# Patient Record
Sex: FEMALE | Race: WHITE | NOT HISPANIC OR LATINO | Employment: OTHER | URBAN - METROPOLITAN AREA
[De-identification: names, ages, dates, MRNs, and addresses within clinical notes are randomized per-mention and may not be internally consistent; named-entity substitution may affect disease eponyms.]

---

## 2021-12-15 ENCOUNTER — OFFICE VISIT (OUTPATIENT)
Dept: WOUND CARE | Facility: HOSPITAL | Age: 61
End: 2021-12-15
Payer: COMMERCIAL

## 2021-12-15 VITALS
DIASTOLIC BLOOD PRESSURE: 94 MMHG | TEMPERATURE: 97.3 F | WEIGHT: 203 LBS | BODY MASS INDEX: 37.36 KG/M2 | RESPIRATION RATE: 20 BRPM | HEART RATE: 76 BPM | HEIGHT: 62 IN | SYSTOLIC BLOOD PRESSURE: 170 MMHG

## 2021-12-15 DIAGNOSIS — L97.919 CHRONIC VENOUS HYPERTENSION (IDIOPATHIC) WITH ULCER OF RIGHT LOWER EXTREMITY (HCC): Primary | ICD-10-CM

## 2021-12-15 DIAGNOSIS — I87.311 CHRONIC VENOUS HYPERTENSION (IDIOPATHIC) WITH ULCER OF RIGHT LOWER EXTREMITY (HCC): Primary | ICD-10-CM

## 2021-12-15 PROCEDURE — 87186 SC STD MICRODIL/AGAR DIL: CPT | Performed by: FAMILY MEDICINE

## 2021-12-15 PROCEDURE — 99204 OFFICE O/P NEW MOD 45 MIN: CPT | Performed by: FAMILY MEDICINE

## 2021-12-15 PROCEDURE — 87070 CULTURE OTHR SPECIMN AEROBIC: CPT | Performed by: FAMILY MEDICINE

## 2021-12-15 PROCEDURE — 11042 DBRDMT SUBQ TIS 1ST 20SQCM/<: CPT | Performed by: FAMILY MEDICINE

## 2021-12-15 PROCEDURE — 87147 CULTURE TYPE IMMUNOLOGIC: CPT | Performed by: FAMILY MEDICINE

## 2021-12-15 PROCEDURE — 99203 OFFICE O/P NEW LOW 30 MIN: CPT | Performed by: FAMILY MEDICINE

## 2021-12-15 PROCEDURE — 87205 SMEAR GRAM STAIN: CPT | Performed by: FAMILY MEDICINE

## 2021-12-15 RX ORDER — SULFAMETHOXAZOLE AND TRIMETHOPRIM 800; 160 MG/1; MG/1
1 TABLET ORAL EVERY 12 HOURS SCHEDULED
Qty: 20 TABLET | Refills: 0 | Status: SHIPPED | OUTPATIENT
Start: 2021-12-15 | End: 2021-12-27 | Stop reason: HOSPADM

## 2021-12-15 RX ORDER — LIDOCAINE HYDROCHLORIDE 40 MG/ML
5 SOLUTION TOPICAL ONCE
Status: COMPLETED | OUTPATIENT
Start: 2021-12-15 | End: 2021-12-15

## 2021-12-15 RX ADMIN — LIDOCAINE HYDROCHLORIDE 5 ML: 40 SOLUTION TOPICAL at 09:49

## 2021-12-18 LAB
BACTERIA WND AEROBE CULT: ABNORMAL
BACTERIA WND AEROBE CULT: ABNORMAL
GRAM STN SPEC: ABNORMAL
GRAM STN SPEC: ABNORMAL

## 2021-12-22 ENCOUNTER — HOSPITAL ENCOUNTER (INPATIENT)
Facility: HOSPITAL | Age: 61
LOS: 5 days | Discharge: HOME | DRG: 603 | End: 2021-12-27
Attending: EMERGENCY MEDICINE | Admitting: HOSPITALIST
Payer: COMMERCIAL

## 2021-12-22 ENCOUNTER — APPOINTMENT (EMERGENCY)
Dept: RADIOLOGY | Facility: HOSPITAL | Age: 61
DRG: 603 | End: 2021-12-22
Payer: COMMERCIAL

## 2021-12-22 ENCOUNTER — OFFICE VISIT (OUTPATIENT)
Dept: WOUND CARE | Facility: HOSPITAL | Age: 61
DRG: 603 | End: 2021-12-22
Payer: COMMERCIAL

## 2021-12-22 VITALS
HEART RATE: 68 BPM | RESPIRATION RATE: 18 BRPM | TEMPERATURE: 96.4 F | DIASTOLIC BLOOD PRESSURE: 70 MMHG | SYSTOLIC BLOOD PRESSURE: 154 MMHG

## 2021-12-22 DIAGNOSIS — L03.90 CELLULITIS: Primary | ICD-10-CM

## 2021-12-22 DIAGNOSIS — I87.311 CHRONIC VENOUS HYPERTENSION (IDIOPATHIC) WITH ULCER OF RIGHT LOWER EXTREMITY (HCC): ICD-10-CM

## 2021-12-22 DIAGNOSIS — I87.311 CHRONIC VENOUS HYPERTENSION (IDIOPATHIC) WITH ULCER OF RIGHT LOWER EXTREMITY (HCC): Primary | ICD-10-CM

## 2021-12-22 DIAGNOSIS — L03.115 CELLULITIS OF RIGHT ANTERIOR LOWER LEG: ICD-10-CM

## 2021-12-22 DIAGNOSIS — L97.919 CHRONIC VENOUS HYPERTENSION (IDIOPATHIC) WITH ULCER OF RIGHT LOWER EXTREMITY (HCC): ICD-10-CM

## 2021-12-22 DIAGNOSIS — L97.919 CHRONIC VENOUS HYPERTENSION (IDIOPATHIC) WITH ULCER OF RIGHT LOWER EXTREMITY (HCC): Primary | ICD-10-CM

## 2021-12-22 PROBLEM — R03.0 BLOOD PRESSURE ELEVATED WITHOUT HISTORY OF HTN: Status: ACTIVE | Noted: 2021-12-22

## 2021-12-22 LAB
ANION GAP SERPL CALCULATED.3IONS-SCNC: 4 MMOL/L (ref 4–13)
BASOPHILS # BLD AUTO: 0.03 THOUSANDS/ΜL (ref 0–0.1)
BASOPHILS NFR BLD AUTO: 0 % (ref 0–1)
BUN SERPL-MCNC: 13 MG/DL (ref 5–25)
CALCIUM SERPL-MCNC: 8.7 MG/DL (ref 8.3–10.1)
CHLORIDE SERPL-SCNC: 102 MMOL/L (ref 100–108)
CO2 SERPL-SCNC: 28 MMOL/L (ref 21–32)
CREAT SERPL-MCNC: 0.81 MG/DL (ref 0.6–1.3)
EOSINOPHIL # BLD AUTO: 0.22 THOUSAND/ΜL (ref 0–0.61)
EOSINOPHIL NFR BLD AUTO: 3 % (ref 0–6)
ERYTHROCYTE [DISTWIDTH] IN BLOOD BY AUTOMATED COUNT: 12.5 % (ref 11.6–15.1)
GFR SERPL CREATININE-BSD FRML MDRD: 78 ML/MIN/1.73SQ M
GLUCOSE SERPL-MCNC: 88 MG/DL (ref 65–140)
HCT VFR BLD AUTO: 45.9 % (ref 34.8–46.1)
HGB BLD-MCNC: 14.5 G/DL (ref 11.5–15.4)
IMM GRANULOCYTES # BLD AUTO: 0.01 THOUSAND/UL (ref 0–0.2)
IMM GRANULOCYTES NFR BLD AUTO: 0 % (ref 0–2)
LYMPHOCYTES # BLD AUTO: 2.04 THOUSANDS/ΜL (ref 0.6–4.47)
LYMPHOCYTES NFR BLD AUTO: 25 % (ref 14–44)
MCH RBC QN AUTO: 29.8 PG (ref 26.8–34.3)
MCHC RBC AUTO-ENTMCNC: 31.6 G/DL (ref 31.4–37.4)
MCV RBC AUTO: 94 FL (ref 82–98)
MONOCYTES # BLD AUTO: 0.6 THOUSAND/ΜL (ref 0.17–1.22)
MONOCYTES NFR BLD AUTO: 7 % (ref 4–12)
NEUTROPHILS # BLD AUTO: 5.37 THOUSANDS/ΜL (ref 1.85–7.62)
NEUTS SEG NFR BLD AUTO: 65 % (ref 43–75)
NRBC BLD AUTO-RTO: 0 /100 WBCS
PLATELET # BLD AUTO: 264 THOUSANDS/UL (ref 149–390)
PMV BLD AUTO: 8.9 FL (ref 8.9–12.7)
POTASSIUM SERPL-SCNC: 3.7 MMOL/L (ref 3.5–5.3)
RBC # BLD AUTO: 4.87 MILLION/UL (ref 3.81–5.12)
SODIUM SERPL-SCNC: 134 MMOL/L (ref 136–145)
WBC # BLD AUTO: 8.27 THOUSAND/UL (ref 4.31–10.16)

## 2021-12-22 PROCEDURE — 99284 EMERGENCY DEPT VISIT MOD MDM: CPT

## 2021-12-22 PROCEDURE — 36415 COLL VENOUS BLD VENIPUNCTURE: CPT | Performed by: EMERGENCY MEDICINE

## 2021-12-22 PROCEDURE — 99222 1ST HOSP IP/OBS MODERATE 55: CPT | Performed by: HOSPITALIST

## 2021-12-22 PROCEDURE — 80048 BASIC METABOLIC PNL TOTAL CA: CPT | Performed by: EMERGENCY MEDICINE

## 2021-12-22 PROCEDURE — 99212 OFFICE O/P EST SF 10 MIN: CPT | Performed by: FAMILY MEDICINE

## 2021-12-22 PROCEDURE — 96365 THER/PROPH/DIAG IV INF INIT: CPT

## 2021-12-22 PROCEDURE — 73590 X-RAY EXAM OF LOWER LEG: CPT

## 2021-12-22 PROCEDURE — 85025 COMPLETE CBC W/AUTO DIFF WBC: CPT | Performed by: EMERGENCY MEDICINE

## 2021-12-22 PROCEDURE — 99285 EMERGENCY DEPT VISIT HI MDM: CPT | Performed by: EMERGENCY MEDICINE

## 2021-12-22 PROCEDURE — 99214 OFFICE O/P EST MOD 30 MIN: CPT | Performed by: FAMILY MEDICINE

## 2021-12-22 RX ORDER — VANCOMYCIN HYDROCHLORIDE 1 G/200ML
15 INJECTION, SOLUTION INTRAVENOUS EVERY 12 HOURS
Status: DISCONTINUED | OUTPATIENT
Start: 2021-12-23 | End: 2021-12-24

## 2021-12-22 RX ORDER — SODIUM CHLORIDE 9 MG/ML
75 INJECTION, SOLUTION INTRAVENOUS CONTINUOUS
Status: DISCONTINUED | OUTPATIENT
Start: 2021-12-22 | End: 2021-12-24

## 2021-12-22 RX ORDER — CALCIUM CARBONATE 200(500)MG
1000 TABLET,CHEWABLE ORAL DAILY PRN
Status: DISCONTINUED | OUTPATIENT
Start: 2021-12-22 | End: 2021-12-27 | Stop reason: HOSPADM

## 2021-12-22 RX ORDER — TRAMADOL HYDROCHLORIDE 50 MG/1
50 TABLET ORAL ONCE
Status: COMPLETED | OUTPATIENT
Start: 2021-12-22 | End: 2021-12-22

## 2021-12-22 RX ORDER — OXYCODONE HYDROCHLORIDE AND ACETAMINOPHEN 5; 325 MG/1; MG/1
1 TABLET ORAL EVERY 4 HOURS PRN
Status: DISCONTINUED | OUTPATIENT
Start: 2021-12-22 | End: 2021-12-27 | Stop reason: HOSPADM

## 2021-12-22 RX ORDER — LIDOCAINE HYDROCHLORIDE 40 MG/ML
5 SOLUTION TOPICAL ONCE
Status: DISCONTINUED | OUTPATIENT
Start: 2021-12-22 | End: 2021-12-22 | Stop reason: ALTCHOICE

## 2021-12-22 RX ORDER — POLYETHYLENE GLYCOL 3350 17 G/17G
17 POWDER, FOR SOLUTION ORAL DAILY
Status: DISCONTINUED | OUTPATIENT
Start: 2021-12-22 | End: 2021-12-27 | Stop reason: HOSPADM

## 2021-12-22 RX ORDER — ACETAMINOPHEN 325 MG/1
650 TABLET ORAL EVERY 6 HOURS PRN
Status: DISCONTINUED | OUTPATIENT
Start: 2021-12-22 | End: 2021-12-27 | Stop reason: HOSPADM

## 2021-12-22 RX ORDER — SENNOSIDES 8.6 MG
8.8 TABLET ORAL DAILY
Status: DISCONTINUED | OUTPATIENT
Start: 2021-12-22 | End: 2021-12-27 | Stop reason: HOSPADM

## 2021-12-22 RX ORDER — LIDOCAINE HYDROCHLORIDE 40 MG/ML
5 SOLUTION TOPICAL ONCE
Status: DISCONTINUED | OUTPATIENT
Start: 2021-12-22 | End: 2021-12-27 | Stop reason: HOSPADM

## 2021-12-22 RX ORDER — ONDANSETRON 2 MG/ML
4 INJECTION INTRAMUSCULAR; INTRAVENOUS EVERY 6 HOURS PRN
Status: DISCONTINUED | OUTPATIENT
Start: 2021-12-22 | End: 2021-12-27 | Stop reason: HOSPADM

## 2021-12-22 RX ADMIN — TRAMADOL HYDROCHLORIDE 50 MG: 50 TABLET, FILM COATED ORAL at 12:05

## 2021-12-22 RX ADMIN — SODIUM CHLORIDE 75 ML/HR: 0.9 INJECTION, SOLUTION INTRAVENOUS at 16:25

## 2021-12-22 RX ADMIN — LIDOCAINE HYDROCHLORIDE 5 ML: 40 SOLUTION TOPICAL at 09:03

## 2021-12-22 RX ADMIN — VANCOMYCIN HYDROCHLORIDE 1000 MG: 1 INJECTION, SOLUTION INTRAVENOUS at 23:41

## 2021-12-22 RX ADMIN — VANCOMYCIN HYDROCHLORIDE 1500 MG: 1 INJECTION, POWDER, LYOPHILIZED, FOR SOLUTION INTRAVENOUS at 12:06

## 2021-12-22 RX ADMIN — OXYCODONE HYDROCHLORIDE AND ACETAMINOPHEN 1 TABLET: 5; 325 TABLET ORAL at 16:02

## 2021-12-22 RX ADMIN — OXYCODONE HYDROCHLORIDE AND ACETAMINOPHEN 1 TABLET: 5; 325 TABLET ORAL at 20:13

## 2021-12-23 LAB
ALBUMIN SERPL BCP-MCNC: 3.4 G/DL (ref 3.5–5)
ALP SERPL-CCNC: 96 U/L (ref 46–116)
ALT SERPL W P-5'-P-CCNC: 33 U/L (ref 12–78)
ANION GAP SERPL CALCULATED.3IONS-SCNC: 8 MMOL/L (ref 4–13)
AST SERPL W P-5'-P-CCNC: 25 U/L (ref 5–45)
BASOPHILS # BLD AUTO: 0.04 THOUSANDS/ΜL (ref 0–0.1)
BASOPHILS NFR BLD AUTO: 1 % (ref 0–1)
BILIRUB SERPL-MCNC: 0.5 MG/DL (ref 0.2–1)
BUN SERPL-MCNC: 13 MG/DL (ref 5–25)
CALCIUM ALBUM COR SERPL-MCNC: 8.6 MG/DL (ref 8.3–10.1)
CALCIUM SERPL-MCNC: 8.1 MG/DL (ref 8.3–10.1)
CHLORIDE SERPL-SCNC: 107 MMOL/L (ref 100–108)
CO2 SERPL-SCNC: 24 MMOL/L (ref 21–32)
CREAT SERPL-MCNC: 0.72 MG/DL (ref 0.6–1.3)
EOSINOPHIL # BLD AUTO: 0.24 THOUSAND/ΜL (ref 0–0.61)
EOSINOPHIL NFR BLD AUTO: 4 % (ref 0–6)
ERYTHROCYTE [DISTWIDTH] IN BLOOD BY AUTOMATED COUNT: 12.5 % (ref 11.6–15.1)
GFR SERPL CREATININE-BSD FRML MDRD: 90 ML/MIN/1.73SQ M
GLUCOSE SERPL-MCNC: 86 MG/DL (ref 65–140)
HCT VFR BLD AUTO: 42.8 % (ref 34.8–46.1)
HGB BLD-MCNC: 13.8 G/DL (ref 11.5–15.4)
IMM GRANULOCYTES # BLD AUTO: 0 THOUSAND/UL (ref 0–0.2)
IMM GRANULOCYTES NFR BLD AUTO: 0 % (ref 0–2)
LYMPHOCYTES # BLD AUTO: 2.44 THOUSANDS/ΜL (ref 0.6–4.47)
LYMPHOCYTES NFR BLD AUTO: 40 % (ref 14–44)
MCH RBC QN AUTO: 30.5 PG (ref 26.8–34.3)
MCHC RBC AUTO-ENTMCNC: 32.2 G/DL (ref 31.4–37.4)
MCV RBC AUTO: 95 FL (ref 82–98)
MONOCYTES # BLD AUTO: 0.59 THOUSAND/ΜL (ref 0.17–1.22)
MONOCYTES NFR BLD AUTO: 10 % (ref 4–12)
NEUTROPHILS # BLD AUTO: 2.82 THOUSANDS/ΜL (ref 1.85–7.62)
NEUTS SEG NFR BLD AUTO: 45 % (ref 43–75)
NRBC BLD AUTO-RTO: 0 /100 WBCS
PLATELET # BLD AUTO: 240 THOUSANDS/UL (ref 149–390)
PMV BLD AUTO: 9.4 FL (ref 8.9–12.7)
POTASSIUM SERPL-SCNC: 3.9 MMOL/L (ref 3.5–5.3)
PROT SERPL-MCNC: 7 G/DL (ref 6.4–8.2)
RBC # BLD AUTO: 4.53 MILLION/UL (ref 3.81–5.12)
SODIUM SERPL-SCNC: 139 MMOL/L (ref 136–145)
WBC # BLD AUTO: 6.13 THOUSAND/UL (ref 4.31–10.16)

## 2021-12-23 PROCEDURE — 99232 SBSQ HOSP IP/OBS MODERATE 35: CPT | Performed by: HOSPITALIST

## 2021-12-23 PROCEDURE — 80202 ASSAY OF VANCOMYCIN: CPT | Performed by: HOSPITALIST

## 2021-12-23 PROCEDURE — 85025 COMPLETE CBC W/AUTO DIFF WBC: CPT | Performed by: HOSPITALIST

## 2021-12-23 PROCEDURE — 80053 COMPREHEN METABOLIC PANEL: CPT | Performed by: HOSPITALIST

## 2021-12-23 RX ORDER — LISINOPRIL 10 MG/1
10 TABLET ORAL DAILY
Status: DISCONTINUED | OUTPATIENT
Start: 2021-12-23 | End: 2021-12-24

## 2021-12-23 RX ADMIN — OXYCODONE HYDROCHLORIDE AND ACETAMINOPHEN 1 TABLET: 5; 325 TABLET ORAL at 23:32

## 2021-12-23 RX ADMIN — OXYCODONE HYDROCHLORIDE AND ACETAMINOPHEN 1 TABLET: 5; 325 TABLET ORAL at 07:51

## 2021-12-23 RX ADMIN — LISINOPRIL 10 MG: 10 TABLET ORAL at 12:47

## 2021-12-23 RX ADMIN — OXYCODONE HYDROCHLORIDE AND ACETAMINOPHEN 1 TABLET: 5; 325 TABLET ORAL at 17:41

## 2021-12-23 RX ADMIN — OXYCODONE HYDROCHLORIDE AND ACETAMINOPHEN 1 TABLET: 5; 325 TABLET ORAL at 03:07

## 2021-12-23 RX ADMIN — VANCOMYCIN HYDROCHLORIDE 1000 MG: 1 INJECTION, SOLUTION INTRAVENOUS at 13:00

## 2021-12-23 RX ADMIN — OXYCODONE HYDROCHLORIDE AND ACETAMINOPHEN 1 TABLET: 5; 325 TABLET ORAL at 13:03

## 2021-12-23 RX ADMIN — STANDARDIZED SENNA CONCENTRATE 8.6 MG: 8.6 TABLET ORAL at 07:50

## 2021-12-23 RX ADMIN — SODIUM CHLORIDE 75 ML/HR: 0.9 INJECTION, SOLUTION INTRAVENOUS at 12:59

## 2021-12-23 RX ADMIN — ENOXAPARIN SODIUM 40 MG: 100 INJECTION SUBCUTANEOUS at 07:52

## 2021-12-23 RX ADMIN — POLYETHYLENE GLYCOL 3350 17 G: 17 POWDER, FOR SOLUTION ORAL at 07:50

## 2021-12-24 PROBLEM — I10 PRIMARY HYPERTENSION: Status: ACTIVE | Noted: 2021-12-22

## 2021-12-24 LAB
ALBUMIN SERPL BCP-MCNC: 3.3 G/DL (ref 3.5–5)
ALP SERPL-CCNC: 90 U/L (ref 46–116)
ALT SERPL W P-5'-P-CCNC: 34 U/L (ref 12–78)
ANION GAP SERPL CALCULATED.3IONS-SCNC: 6 MMOL/L (ref 4–13)
AST SERPL W P-5'-P-CCNC: 25 U/L (ref 5–45)
BASOPHILS # BLD AUTO: 0.03 THOUSANDS/ΜL (ref 0–0.1)
BASOPHILS NFR BLD AUTO: 1 % (ref 0–1)
BILIRUB SERPL-MCNC: 0.4 MG/DL (ref 0.2–1)
BUN SERPL-MCNC: 13 MG/DL (ref 5–25)
CALCIUM ALBUM COR SERPL-MCNC: 8.8 MG/DL (ref 8.3–10.1)
CALCIUM SERPL-MCNC: 8.2 MG/DL (ref 8.3–10.1)
CHLORIDE SERPL-SCNC: 106 MMOL/L (ref 100–108)
CO2 SERPL-SCNC: 27 MMOL/L (ref 21–32)
CREAT SERPL-MCNC: 0.7 MG/DL (ref 0.6–1.3)
EOSINOPHIL # BLD AUTO: 0.23 THOUSAND/ΜL (ref 0–0.61)
EOSINOPHIL NFR BLD AUTO: 4 % (ref 0–6)
ERYTHROCYTE [DISTWIDTH] IN BLOOD BY AUTOMATED COUNT: 12.2 % (ref 11.6–15.1)
GFR SERPL CREATININE-BSD FRML MDRD: 93 ML/MIN/1.73SQ M
GLUCOSE SERPL-MCNC: 86 MG/DL (ref 65–140)
HCT VFR BLD AUTO: 44.1 % (ref 34.8–46.1)
HGB BLD-MCNC: 13.8 G/DL (ref 11.5–15.4)
IMM GRANULOCYTES # BLD AUTO: 0.01 THOUSAND/UL (ref 0–0.2)
IMM GRANULOCYTES NFR BLD AUTO: 0 % (ref 0–2)
LYMPHOCYTES # BLD AUTO: 2.45 THOUSANDS/ΜL (ref 0.6–4.47)
LYMPHOCYTES NFR BLD AUTO: 39 % (ref 14–44)
MCH RBC QN AUTO: 29.7 PG (ref 26.8–34.3)
MCHC RBC AUTO-ENTMCNC: 31.3 G/DL (ref 31.4–37.4)
MCV RBC AUTO: 95 FL (ref 82–98)
MONOCYTES # BLD AUTO: 0.58 THOUSAND/ΜL (ref 0.17–1.22)
MONOCYTES NFR BLD AUTO: 9 % (ref 4–12)
NEUTROPHILS # BLD AUTO: 3 THOUSANDS/ΜL (ref 1.85–7.62)
NEUTS SEG NFR BLD AUTO: 47 % (ref 43–75)
NRBC BLD AUTO-RTO: 0 /100 WBCS
PLATELET # BLD AUTO: 242 THOUSANDS/UL (ref 149–390)
PMV BLD AUTO: 9.2 FL (ref 8.9–12.7)
POTASSIUM SERPL-SCNC: 3.8 MMOL/L (ref 3.5–5.3)
PROT SERPL-MCNC: 6.9 G/DL (ref 6.4–8.2)
RBC # BLD AUTO: 4.65 MILLION/UL (ref 3.81–5.12)
SODIUM SERPL-SCNC: 139 MMOL/L (ref 136–145)
VANCOMYCIN TROUGH SERPL-MCNC: 9.1 UG/ML (ref 10–20)
WBC # BLD AUTO: 6.3 THOUSAND/UL (ref 4.31–10.16)

## 2021-12-24 PROCEDURE — 99232 SBSQ HOSP IP/OBS MODERATE 35: CPT | Performed by: HOSPITALIST

## 2021-12-24 PROCEDURE — 80053 COMPREHEN METABOLIC PANEL: CPT | Performed by: HOSPITALIST

## 2021-12-24 PROCEDURE — 85025 COMPLETE CBC W/AUTO DIFF WBC: CPT | Performed by: HOSPITALIST

## 2021-12-24 RX ORDER — LISINOPRIL 20 MG/1
20 TABLET ORAL DAILY
Status: DISCONTINUED | OUTPATIENT
Start: 2021-12-24 | End: 2021-12-27 | Stop reason: HOSPADM

## 2021-12-24 RX ORDER — VANCOMYCIN HYDROCHLORIDE 1 G/200ML
15 INJECTION, SOLUTION INTRAVENOUS EVERY 8 HOURS
Status: DISCONTINUED | OUTPATIENT
Start: 2021-12-24 | End: 2021-12-27

## 2021-12-24 RX ADMIN — LISINOPRIL 20 MG: 20 TABLET ORAL at 09:45

## 2021-12-24 RX ADMIN — OXYCODONE HYDROCHLORIDE AND ACETAMINOPHEN 1 TABLET: 5; 325 TABLET ORAL at 03:34

## 2021-12-24 RX ADMIN — VANCOMYCIN HYDROCHLORIDE 1000 MG: 1 INJECTION, SOLUTION INTRAVENOUS at 09:37

## 2021-12-24 RX ADMIN — Medication 3.38 G: at 12:07

## 2021-12-24 RX ADMIN — OXYCODONE HYDROCHLORIDE AND ACETAMINOPHEN 1 TABLET: 5; 325 TABLET ORAL at 16:25

## 2021-12-24 RX ADMIN — VANCOMYCIN HYDROCHLORIDE 1000 MG: 1 INJECTION, SOLUTION INTRAVENOUS at 16:27

## 2021-12-24 RX ADMIN — ENOXAPARIN SODIUM 40 MG: 100 INJECTION SUBCUTANEOUS at 09:45

## 2021-12-24 RX ADMIN — POLYETHYLENE GLYCOL 3350 17 G: 17 POWDER, FOR SOLUTION ORAL at 09:45

## 2021-12-24 RX ADMIN — Medication 3.38 G: at 18:13

## 2021-12-24 RX ADMIN — OXYCODONE HYDROCHLORIDE AND ACETAMINOPHEN 1 TABLET: 5; 325 TABLET ORAL at 20:27

## 2021-12-24 RX ADMIN — SODIUM CHLORIDE 75 ML/HR: 0.9 INJECTION, SOLUTION INTRAVENOUS at 03:43

## 2021-12-24 RX ADMIN — VANCOMYCIN HYDROCHLORIDE 1000 MG: 1 INJECTION, SOLUTION INTRAVENOUS at 00:28

## 2021-12-24 RX ADMIN — OXYCODONE HYDROCHLORIDE AND ACETAMINOPHEN 1 TABLET: 5; 325 TABLET ORAL at 09:47

## 2021-12-24 RX ADMIN — Medication 3.38 G: at 23:03

## 2021-12-24 RX ADMIN — STANDARDIZED SENNA CONCENTRATE 8.6 MG: 8.6 TABLET ORAL at 09:46

## 2021-12-25 LAB
ALBUMIN SERPL BCP-MCNC: 3.3 G/DL (ref 3.5–5)
ALP SERPL-CCNC: 83 U/L (ref 46–116)
ALT SERPL W P-5'-P-CCNC: 36 U/L (ref 12–78)
ANION GAP SERPL CALCULATED.3IONS-SCNC: 6 MMOL/L (ref 4–13)
AST SERPL W P-5'-P-CCNC: 27 U/L (ref 5–45)
BILIRUB SERPL-MCNC: 0.5 MG/DL (ref 0.2–1)
BUN SERPL-MCNC: 11 MG/DL (ref 5–25)
CALCIUM ALBUM COR SERPL-MCNC: 9 MG/DL (ref 8.3–10.1)
CALCIUM SERPL-MCNC: 8.4 MG/DL (ref 8.3–10.1)
CHLORIDE SERPL-SCNC: 107 MMOL/L (ref 100–108)
CO2 SERPL-SCNC: 27 MMOL/L (ref 21–32)
CREAT SERPL-MCNC: 0.8 MG/DL (ref 0.6–1.3)
ERYTHROCYTE [DISTWIDTH] IN BLOOD BY AUTOMATED COUNT: 12.3 % (ref 11.6–15.1)
GFR SERPL CREATININE-BSD FRML MDRD: 79 ML/MIN/1.73SQ M
GLUCOSE SERPL-MCNC: 99 MG/DL (ref 65–140)
HCT VFR BLD AUTO: 43.8 % (ref 34.8–46.1)
HGB BLD-MCNC: 14 G/DL (ref 11.5–15.4)
MCH RBC QN AUTO: 29.9 PG (ref 26.8–34.3)
MCHC RBC AUTO-ENTMCNC: 32 G/DL (ref 31.4–37.4)
MCV RBC AUTO: 94 FL (ref 82–98)
PLATELET # BLD AUTO: 245 THOUSANDS/UL (ref 149–390)
PMV BLD AUTO: 8.9 FL (ref 8.9–12.7)
POTASSIUM SERPL-SCNC: 3.9 MMOL/L (ref 3.5–5.3)
PROT SERPL-MCNC: 6.8 G/DL (ref 6.4–8.2)
RBC # BLD AUTO: 4.68 MILLION/UL (ref 3.81–5.12)
SODIUM SERPL-SCNC: 140 MMOL/L (ref 136–145)
VANCOMYCIN TROUGH SERPL-MCNC: 21.9 UG/ML (ref 10–20)
WBC # BLD AUTO: 6.06 THOUSAND/UL (ref 4.31–10.16)

## 2021-12-25 PROCEDURE — 80202 ASSAY OF VANCOMYCIN: CPT | Performed by: HOSPITALIST

## 2021-12-25 PROCEDURE — 99232 SBSQ HOSP IP/OBS MODERATE 35: CPT | Performed by: HOSPITALIST

## 2021-12-25 PROCEDURE — 85025 COMPLETE CBC W/AUTO DIFF WBC: CPT | Performed by: HOSPITALIST

## 2021-12-25 PROCEDURE — 80053 COMPREHEN METABOLIC PANEL: CPT | Performed by: HOSPITALIST

## 2021-12-25 RX ADMIN — OXYCODONE HYDROCHLORIDE AND ACETAMINOPHEN 1 TABLET: 5; 325 TABLET ORAL at 20:13

## 2021-12-25 RX ADMIN — STANDARDIZED SENNA CONCENTRATE 8.6 MG: 8.6 TABLET ORAL at 09:38

## 2021-12-25 RX ADMIN — VANCOMYCIN HYDROCHLORIDE 1000 MG: 1 INJECTION, SOLUTION INTRAVENOUS at 00:22

## 2021-12-25 RX ADMIN — Medication 3.38 G: at 04:42

## 2021-12-25 RX ADMIN — Medication 3.38 G: at 22:24

## 2021-12-25 RX ADMIN — POLYETHYLENE GLYCOL 3350 17 G: 17 POWDER, FOR SOLUTION ORAL at 09:40

## 2021-12-25 RX ADMIN — VANCOMYCIN HYDROCHLORIDE 1000 MG: 1 INJECTION, SOLUTION INTRAVENOUS at 17:39

## 2021-12-25 RX ADMIN — LISINOPRIL 20 MG: 20 TABLET ORAL at 09:38

## 2021-12-25 RX ADMIN — ENOXAPARIN SODIUM 40 MG: 100 INJECTION SUBCUTANEOUS at 09:37

## 2021-12-25 RX ADMIN — OXYCODONE HYDROCHLORIDE AND ACETAMINOPHEN 1 TABLET: 5; 325 TABLET ORAL at 11:06

## 2021-12-25 RX ADMIN — OXYCODONE HYDROCHLORIDE AND ACETAMINOPHEN 1 TABLET: 5; 325 TABLET ORAL at 00:27

## 2021-12-25 RX ADMIN — VANCOMYCIN HYDROCHLORIDE 1000 MG: 1 INJECTION, SOLUTION INTRAVENOUS at 09:33

## 2021-12-25 RX ADMIN — Medication 3.38 G: at 16:33

## 2021-12-25 RX ADMIN — OXYCODONE HYDROCHLORIDE AND ACETAMINOPHEN 1 TABLET: 5; 325 TABLET ORAL at 06:23

## 2021-12-25 RX ADMIN — OXYCODONE HYDROCHLORIDE AND ACETAMINOPHEN 1 TABLET: 5; 325 TABLET ORAL at 14:48

## 2021-12-25 RX ADMIN — Medication 3.38 G: at 11:04

## 2021-12-26 ENCOUNTER — APPOINTMENT (INPATIENT)
Dept: MRI IMAGING | Facility: HOSPITAL | Age: 61
DRG: 603 | End: 2021-12-26
Payer: COMMERCIAL

## 2021-12-26 LAB
ALBUMIN SERPL BCP-MCNC: 3.1 G/DL (ref 3.5–5)
ALP SERPL-CCNC: 81 U/L (ref 46–116)
ALT SERPL W P-5'-P-CCNC: 47 U/L (ref 12–78)
ANION GAP SERPL CALCULATED.3IONS-SCNC: 6 MMOL/L (ref 4–13)
AST SERPL W P-5'-P-CCNC: 35 U/L (ref 5–45)
BASOPHILS # BLD AUTO: 0.04 THOUSANDS/ΜL (ref 0–0.1)
BASOPHILS NFR BLD AUTO: 1 % (ref 0–1)
BILIRUB SERPL-MCNC: 0.4 MG/DL (ref 0.2–1)
BUN SERPL-MCNC: 13 MG/DL (ref 5–25)
CALCIUM ALBUM COR SERPL-MCNC: 8.9 MG/DL (ref 8.3–10.1)
CALCIUM SERPL-MCNC: 8.2 MG/DL (ref 8.3–10.1)
CHLORIDE SERPL-SCNC: 106 MMOL/L (ref 100–108)
CO2 SERPL-SCNC: 27 MMOL/L (ref 21–32)
CREAT SERPL-MCNC: 0.86 MG/DL (ref 0.6–1.3)
EOSINOPHIL # BLD AUTO: 0.25 THOUSAND/ΜL (ref 0–0.61)
EOSINOPHIL NFR BLD AUTO: 4 % (ref 0–6)
ERYTHROCYTE [DISTWIDTH] IN BLOOD BY AUTOMATED COUNT: 12.2 % (ref 11.6–15.1)
GFR SERPL CREATININE-BSD FRML MDRD: 73 ML/MIN/1.73SQ M
GLUCOSE SERPL-MCNC: 93 MG/DL (ref 65–140)
HCT VFR BLD AUTO: 40.9 % (ref 34.8–46.1)
HGB BLD-MCNC: 12.9 G/DL (ref 11.5–15.4)
IMM GRANULOCYTES # BLD AUTO: 0.02 THOUSAND/UL (ref 0–0.2)
IMM GRANULOCYTES NFR BLD AUTO: 0 % (ref 0–2)
LYMPHOCYTES # BLD AUTO: 2.17 THOUSANDS/ΜL (ref 0.6–4.47)
LYMPHOCYTES NFR BLD AUTO: 31 % (ref 14–44)
MCH RBC QN AUTO: 29.7 PG (ref 26.8–34.3)
MCHC RBC AUTO-ENTMCNC: 31.5 G/DL (ref 31.4–37.4)
MCV RBC AUTO: 94 FL (ref 82–98)
MONOCYTES # BLD AUTO: 0.6 THOUSAND/ΜL (ref 0.17–1.22)
MONOCYTES NFR BLD AUTO: 9 % (ref 4–12)
NEUTROPHILS # BLD AUTO: 3.93 THOUSANDS/ΜL (ref 1.85–7.62)
NEUTS SEG NFR BLD AUTO: 55 % (ref 43–75)
NRBC BLD AUTO-RTO: 0 /100 WBCS
PLATELET # BLD AUTO: 221 THOUSANDS/UL (ref 149–390)
PMV BLD AUTO: 9.2 FL (ref 8.9–12.7)
POTASSIUM SERPL-SCNC: 3.8 MMOL/L (ref 3.5–5.3)
PROT SERPL-MCNC: 6.4 G/DL (ref 6.4–8.2)
RBC # BLD AUTO: 4.35 MILLION/UL (ref 3.81–5.12)
SODIUM SERPL-SCNC: 139 MMOL/L (ref 136–145)
WBC # BLD AUTO: 7.01 THOUSAND/UL (ref 4.31–10.16)

## 2021-12-26 PROCEDURE — 99232 SBSQ HOSP IP/OBS MODERATE 35: CPT | Performed by: HOSPITALIST

## 2021-12-26 PROCEDURE — 85025 COMPLETE CBC W/AUTO DIFF WBC: CPT | Performed by: HOSPITALIST

## 2021-12-26 PROCEDURE — 73720 MRI LWR EXTREMITY W/O&W/DYE: CPT

## 2021-12-26 PROCEDURE — 80053 COMPREHEN METABOLIC PANEL: CPT | Performed by: HOSPITALIST

## 2021-12-26 PROCEDURE — A9585 GADOBUTROL INJECTION: HCPCS | Performed by: HOSPITALIST

## 2021-12-26 PROCEDURE — G1004 CDSM NDSC: HCPCS

## 2021-12-26 RX ADMIN — VANCOMYCIN HYDROCHLORIDE 1000 MG: 1 INJECTION, SOLUTION INTRAVENOUS at 23:46

## 2021-12-26 RX ADMIN — Medication 3.38 G: at 11:14

## 2021-12-26 RX ADMIN — VANCOMYCIN HYDROCHLORIDE 1000 MG: 1 INJECTION, SOLUTION INTRAVENOUS at 09:37

## 2021-12-26 RX ADMIN — VANCOMYCIN HYDROCHLORIDE 1000 MG: 1 INJECTION, SOLUTION INTRAVENOUS at 00:55

## 2021-12-26 RX ADMIN — OXYCODONE HYDROCHLORIDE AND ACETAMINOPHEN 1 TABLET: 5; 325 TABLET ORAL at 01:02

## 2021-12-26 RX ADMIN — POLYETHYLENE GLYCOL 3350 17 G: 17 POWDER, FOR SOLUTION ORAL at 09:40

## 2021-12-26 RX ADMIN — Medication 3.38 G: at 04:34

## 2021-12-26 RX ADMIN — STANDARDIZED SENNA CONCENTRATE 8.6 MG: 8.6 TABLET ORAL at 09:41

## 2021-12-26 RX ADMIN — OXYCODONE HYDROCHLORIDE AND ACETAMINOPHEN 1 TABLET: 5; 325 TABLET ORAL at 09:40

## 2021-12-26 RX ADMIN — Medication 3.38 G: at 17:20

## 2021-12-26 RX ADMIN — VANCOMYCIN HYDROCHLORIDE 1000 MG: 1 INJECTION, SOLUTION INTRAVENOUS at 15:22

## 2021-12-26 RX ADMIN — OXYCODONE HYDROCHLORIDE AND ACETAMINOPHEN 1 TABLET: 5; 325 TABLET ORAL at 18:34

## 2021-12-26 RX ADMIN — OXYCODONE HYDROCHLORIDE AND ACETAMINOPHEN 1 TABLET: 5; 325 TABLET ORAL at 22:49

## 2021-12-26 RX ADMIN — ENOXAPARIN SODIUM 40 MG: 100 INJECTION SUBCUTANEOUS at 09:47

## 2021-12-26 RX ADMIN — GADOBUTROL 9 ML: 604.72 INJECTION INTRAVENOUS at 12:56

## 2021-12-26 RX ADMIN — LISINOPRIL 20 MG: 20 TABLET ORAL at 09:46

## 2021-12-26 RX ADMIN — OXYCODONE HYDROCHLORIDE AND ACETAMINOPHEN 1 TABLET: 5; 325 TABLET ORAL at 05:32

## 2021-12-26 RX ADMIN — Medication 3.38 G: at 22:50

## 2021-12-26 RX ADMIN — OXYCODONE HYDROCHLORIDE AND ACETAMINOPHEN 1 TABLET: 5; 325 TABLET ORAL at 14:17

## 2021-12-27 VITALS
TEMPERATURE: 98 F | SYSTOLIC BLOOD PRESSURE: 136 MMHG | WEIGHT: 203 LBS | BODY MASS INDEX: 37.36 KG/M2 | HEART RATE: 65 BPM | DIASTOLIC BLOOD PRESSURE: 64 MMHG | RESPIRATION RATE: 12 BRPM | HEIGHT: 62 IN | OXYGEN SATURATION: 97 %

## 2021-12-27 PROBLEM — L97.919 VENOUS ULCER OF RIGHT LOWER EXTREMITY WITH VARICOSE VEINS (HCC): Status: ACTIVE | Noted: 2021-12-27

## 2021-12-27 PROBLEM — I83.019 VENOUS ULCER OF RIGHT LOWER EXTREMITY WITH VARICOSE VEINS (HCC): Status: ACTIVE | Noted: 2021-12-27

## 2021-12-27 LAB
ALBUMIN SERPL BCP-MCNC: 3.5 G/DL (ref 3.5–5)
ALP SERPL-CCNC: 104 U/L (ref 46–116)
ALT SERPL W P-5'-P-CCNC: 76 U/L (ref 12–78)
ANION GAP SERPL CALCULATED.3IONS-SCNC: 12 MMOL/L (ref 4–13)
AST SERPL W P-5'-P-CCNC: 51 U/L (ref 5–45)
BASOPHILS # BLD AUTO: 0.03 THOUSANDS/ΜL (ref 0–0.1)
BASOPHILS NFR BLD AUTO: 0 % (ref 0–1)
BILIRUB SERPL-MCNC: 0.6 MG/DL (ref 0.2–1)
BUN SERPL-MCNC: 13 MG/DL (ref 5–25)
CALCIUM SERPL-MCNC: 8.6 MG/DL (ref 8.3–10.1)
CHLORIDE SERPL-SCNC: 106 MMOL/L (ref 100–108)
CO2 SERPL-SCNC: 24 MMOL/L (ref 21–32)
CREAT SERPL-MCNC: 0.95 MG/DL (ref 0.6–1.3)
EOSINOPHIL # BLD AUTO: 0.18 THOUSAND/ΜL (ref 0–0.61)
EOSINOPHIL NFR BLD AUTO: 3 % (ref 0–6)
ERYTHROCYTE [DISTWIDTH] IN BLOOD BY AUTOMATED COUNT: 12.4 % (ref 11.6–15.1)
GFR SERPL CREATININE-BSD FRML MDRD: 64 ML/MIN/1.73SQ M
GLUCOSE SERPL-MCNC: 96 MG/DL (ref 65–140)
HCT VFR BLD AUTO: 45.9 % (ref 34.8–46.1)
HGB BLD-MCNC: 14.6 G/DL (ref 11.5–15.4)
IMM GRANULOCYTES # BLD AUTO: 0.01 THOUSAND/UL (ref 0–0.2)
IMM GRANULOCYTES NFR BLD AUTO: 0 % (ref 0–2)
LYMPHOCYTES # BLD AUTO: 1.54 THOUSANDS/ΜL (ref 0.6–4.47)
LYMPHOCYTES NFR BLD AUTO: 23 % (ref 14–44)
MCH RBC QN AUTO: 30 PG (ref 26.8–34.3)
MCHC RBC AUTO-ENTMCNC: 31.8 G/DL (ref 31.4–37.4)
MCV RBC AUTO: 94 FL (ref 82–98)
MONOCYTES # BLD AUTO: 0.59 THOUSAND/ΜL (ref 0.17–1.22)
MONOCYTES NFR BLD AUTO: 9 % (ref 4–12)
NEUTROPHILS # BLD AUTO: 4.47 THOUSANDS/ΜL (ref 1.85–7.62)
NEUTS SEG NFR BLD AUTO: 65 % (ref 43–75)
NRBC BLD AUTO-RTO: 0 /100 WBCS
PLATELET # BLD AUTO: 261 THOUSANDS/UL (ref 149–390)
PMV BLD AUTO: 9.3 FL (ref 8.9–12.7)
POTASSIUM SERPL-SCNC: 3.6 MMOL/L (ref 3.5–5.3)
PROT SERPL-MCNC: 7.5 G/DL (ref 6.4–8.2)
RBC # BLD AUTO: 4.86 MILLION/UL (ref 3.81–5.12)
SODIUM SERPL-SCNC: 142 MMOL/L (ref 136–145)
VANCOMYCIN TROUGH SERPL-MCNC: 25.3 UG/ML (ref 10–20)
WBC # BLD AUTO: 6.82 THOUSAND/UL (ref 4.31–10.16)

## 2021-12-27 PROCEDURE — 80202 ASSAY OF VANCOMYCIN: CPT | Performed by: HOSPITALIST

## 2021-12-27 PROCEDURE — 99239 HOSP IP/OBS DSCHRG MGMT >30: CPT | Performed by: INTERNAL MEDICINE

## 2021-12-27 PROCEDURE — 80053 COMPREHEN METABOLIC PANEL: CPT | Performed by: INTERNAL MEDICINE

## 2021-12-27 PROCEDURE — 85025 COMPLETE CBC W/AUTO DIFF WBC: CPT | Performed by: INTERNAL MEDICINE

## 2021-12-27 RX ORDER — AMOXICILLIN AND CLAVULANATE POTASSIUM 875; 125 MG/1; MG/1
1 TABLET, FILM COATED ORAL EVERY 12 HOURS SCHEDULED
Qty: 14 TABLET | Refills: 0 | Status: SHIPPED | OUTPATIENT
Start: 2021-12-27 | End: 2022-01-03

## 2021-12-27 RX ORDER — OXYCODONE HYDROCHLORIDE 5 MG/1
5 TABLET ORAL EVERY 6 HOURS PRN
Qty: 12 TABLET | Refills: 0 | Status: SHIPPED | OUTPATIENT
Start: 2021-12-27 | End: 2021-12-30

## 2021-12-27 RX ORDER — DOXYCYCLINE 100 MG/1
100 CAPSULE ORAL 2 TIMES DAILY
Qty: 14 CAPSULE | Refills: 0 | Status: SHIPPED | OUTPATIENT
Start: 2021-12-27 | End: 2022-01-03

## 2021-12-27 RX ADMIN — OXYCODONE HYDROCHLORIDE AND ACETAMINOPHEN 1 TABLET: 5; 325 TABLET ORAL at 10:18

## 2021-12-27 RX ADMIN — VANCOMYCIN HYDROCHLORIDE 1250 MG: 1 INJECTION, POWDER, LYOPHILIZED, FOR SOLUTION INTRAVENOUS at 12:57

## 2021-12-27 RX ADMIN — POLYETHYLENE GLYCOL 3350 17 G: 17 POWDER, FOR SOLUTION ORAL at 09:21

## 2021-12-27 RX ADMIN — OXYCODONE HYDROCHLORIDE AND ACETAMINOPHEN 1 TABLET: 5; 325 TABLET ORAL at 06:15

## 2021-12-27 RX ADMIN — STANDARDIZED SENNA CONCENTRATE 8.6 MG: 8.6 TABLET ORAL at 09:20

## 2021-12-27 RX ADMIN — ENOXAPARIN SODIUM 40 MG: 100 INJECTION SUBCUTANEOUS at 09:21

## 2021-12-27 RX ADMIN — OXYCODONE HYDROCHLORIDE AND ACETAMINOPHEN 1 TABLET: 5; 325 TABLET ORAL at 02:21

## 2021-12-27 RX ADMIN — Medication 3.38 G: at 09:44

## 2021-12-27 RX ADMIN — Medication 3.38 G: at 05:47

## 2021-12-27 RX ADMIN — LISINOPRIL 20 MG: 20 TABLET ORAL at 09:20

## 2021-12-29 ENCOUNTER — OFFICE VISIT (OUTPATIENT)
Dept: WOUND CARE | Facility: HOSPITAL | Age: 61
End: 2021-12-29
Payer: COMMERCIAL

## 2021-12-29 VITALS
HEART RATE: 72 BPM | DIASTOLIC BLOOD PRESSURE: 70 MMHG | TEMPERATURE: 98.2 F | RESPIRATION RATE: 18 BRPM | SYSTOLIC BLOOD PRESSURE: 138 MMHG

## 2021-12-29 DIAGNOSIS — L97.919 CHRONIC VENOUS HYPERTENSION (IDIOPATHIC) WITH ULCER OF RIGHT LOWER EXTREMITY (HCC): Primary | ICD-10-CM

## 2021-12-29 DIAGNOSIS — L97.912 NON-PRESSURE CHRONIC ULCER OF RIGHT LOWER LEG WITH FAT LAYER EXPOSED (HCC): ICD-10-CM

## 2021-12-29 DIAGNOSIS — I87.311 CHRONIC VENOUS HYPERTENSION (IDIOPATHIC) WITH ULCER OF RIGHT LOWER EXTREMITY (HCC): Primary | ICD-10-CM

## 2021-12-29 PROCEDURE — 99213 OFFICE O/P EST LOW 20 MIN: CPT | Performed by: NURSE PRACTITIONER

## 2021-12-29 PROCEDURE — 97597 DBRDMT OPN WND 1ST 20 CM/<: CPT | Performed by: NURSE PRACTITIONER

## 2021-12-29 RX ORDER — LIDOCAINE HYDROCHLORIDE 40 MG/ML
5 SOLUTION TOPICAL ONCE
Status: COMPLETED | OUTPATIENT
Start: 2021-12-29 | End: 2021-12-29

## 2021-12-29 RX ADMIN — LIDOCAINE HYDROCHLORIDE 5 ML: 40 SOLUTION TOPICAL at 09:03

## 2021-12-30 ENCOUNTER — HOSPITAL ENCOUNTER (OUTPATIENT)
Dept: NON INVASIVE DIAGNOSTICS | Facility: HOSPITAL | Age: 61
Discharge: HOME | End: 2021-12-30
Payer: COMMERCIAL

## 2021-12-30 ENCOUNTER — TELEPHONE (OUTPATIENT)
Dept: WOUND CARE | Facility: HOSPITAL | Age: 61
End: 2021-12-30

## 2021-12-30 DIAGNOSIS — L97.912 NON-PRESSURE CHRONIC ULCER OF RIGHT LOWER LEG WITH FAT LAYER EXPOSED (HCC): ICD-10-CM

## 2021-12-30 DIAGNOSIS — L97.919 CHRONIC VENOUS HYPERTENSION (IDIOPATHIC) WITH ULCER OF RIGHT LOWER EXTREMITY (HCC): ICD-10-CM

## 2021-12-30 DIAGNOSIS — I87.311 CHRONIC VENOUS HYPERTENSION (IDIOPATHIC) WITH ULCER OF RIGHT LOWER EXTREMITY (HCC): ICD-10-CM

## 2021-12-30 PROCEDURE — 93971 EXTREMITY STUDY: CPT

## 2021-12-30 PROCEDURE — NC001 PR NO CHARGE: Performed by: NURSE PRACTITIONER

## 2021-12-30 PROCEDURE — 93926 LOWER EXTREMITY STUDY: CPT

## 2021-12-30 PROCEDURE — 93971 EXTREMITY STUDY: CPT | Performed by: SURGERY

## 2021-12-31 PROCEDURE — 93922 UPR/L XTREMITY ART 2 LEVELS: CPT | Performed by: SURGERY

## 2021-12-31 PROCEDURE — 93926 LOWER EXTREMITY STUDY: CPT | Performed by: SURGERY

## 2022-01-03 ENCOUNTER — OFFICE VISIT (OUTPATIENT)
Dept: WOUND CARE | Facility: HOSPITAL | Age: 62
End: 2022-01-03
Payer: COMMERCIAL

## 2022-01-03 VITALS
RESPIRATION RATE: 16 BRPM | TEMPERATURE: 96 F | HEART RATE: 77 BPM | DIASTOLIC BLOOD PRESSURE: 82 MMHG | SYSTOLIC BLOOD PRESSURE: 169 MMHG

## 2022-01-03 DIAGNOSIS — L97.919 CHRONIC VENOUS HYPERTENSION (IDIOPATHIC) WITH ULCER OF RIGHT LOWER EXTREMITY (HCC): Primary | ICD-10-CM

## 2022-01-03 DIAGNOSIS — I87.311 CHRONIC VENOUS HYPERTENSION (IDIOPATHIC) WITH ULCER OF RIGHT LOWER EXTREMITY (HCC): Primary | ICD-10-CM

## 2022-01-03 PROCEDURE — 99212 OFFICE O/P EST SF 10 MIN: CPT | Performed by: SURGERY

## 2022-01-03 PROCEDURE — 99213 OFFICE O/P EST LOW 20 MIN: CPT | Performed by: SURGERY

## 2022-01-03 RX ORDER — LIDOCAINE 40 MG/G
CREAM TOPICAL ONCE
Status: COMPLETED | OUTPATIENT
Start: 2022-01-03 | End: 2022-01-03

## 2022-01-03 RX ADMIN — LIDOCAINE: 40 CREAM TOPICAL at 08:32

## 2022-01-03 NOTE — PROGRESS NOTES
Patient ID: Felicia Guillen is a 64 y o  female Date of Birth 1960       Chief Complaint   Patient presents with    Follow Up Wound Care Visit     right leg wound        Allergies:  Wound dressing adhesive    Diagnosis:  1  Chronic venous hypertension (idiopathic) with ulcer of right lower extremity (HCC)  Assessment & Plan: Will switch to honey dressings to help with slough    Orders:  -     Wound cleansing and dressings; Future  -     lidocaine (LMX) 4 % cream     Diagnosis ICD-10-CM Associated Orders   1  Chronic venous hypertension (idiopathic) with ulcer of right lower extremity (HCC)  I87 311 Wound cleansing and dressings    L97 919 lidocaine (LMX) 4 % cream          Subjective:   HPI  Here for wound follow up, still somewhat painful  The following portions of the patient's history were reviewed and updated as appropriate:   Patient Active Problem List   Diagnosis    Cellulitis of right anterior lower leg    Primary hypertension    Venous ulcer of right lower extremity with varicose veins (HCC)    Chronic venous hypertension (idiopathic) with ulcer of right lower extremity ([address] )     No past medical history on file  No past surgical history on file    Social History     Socioeconomic History    Marital status: /Civil Union     Spouse name: None    Number of children: None    Years of education: None    Highest education level: None   Occupational History    None   Tobacco Use    Smoking status: Never Smoker    Smokeless tobacco: Never Used   Vaping Use    Vaping Use: Never used   Substance and Sexual Activity    Alcohol use: Not Currently    Drug use: Never    Sexual activity: Not Currently   Other Topics Concern    None   Social History Narrative    None     Social Determinants of Health     Financial Resource Strain: Not on file   Food Insecurity: No Food Insecurity    Worried About Running Out of Food in the Last Year: Never true    Bryce of Food in the Last Year: Never true Transportation Needs: No Transportation Needs    Lack of Transportation (Medical): No    Lack of Transportation (Non-Medical): No   Physical Activity: Not on file   Stress: Not on file   Social Connections: Not on file   Intimate Partner Violence: Not on file   Housing Stability: Low Risk     Unable to Pay for Housing in the Last Year: No    Number of Places Lived in the Last Year: 1    Unstable Housing in the Last Year: No        Current Outpatient Medications:     Acetaminophen 325 MG CAPS, Take 3 capsules (975 mg total) by mouth every 8 (eight) hours as needed (mild pain), Disp: 30 capsule, Rfl: 0    amoxicillin-clavulanate (AUGMENTIN) 875-125 mg per tablet, Take 1 tablet by mouth every 12 (twelve) hours for 7 days, Disp: 14 tablet, Rfl: 0    doxycycline monohydrate (MONODOX) 100 mg capsule, Take 1 capsule (100 mg total) by mouth 2 (two) times a day for 7 days, Disp: 14 capsule, Rfl: 0  No current facility-administered medications for this visit  Family History   Problem Relation Age of Onset    No Known Problems Mother     No Known Problems Father     No Known Problems Sister     No Known Problems Brother     No Known Problems Son     No Known Problems Daughter     No Known Problems Maternal Grandmother     No Known Problems Maternal Grandfather     No Known Problems Paternal Grandmother     No Known Problems Paternal Grandfather     No Known Problems Maternal Aunt     No Known Problems Maternal Uncle     No Known Problems Paternal Aunt     No Known Problems Paternal Uncle     No Known Problems Cousin       Review of Systems      Objective:  /82   Pulse 77   Temp (!) 96 °F (35 6 °C)   Resp 16     Physical Exam        Wound 12/15/21 Venous Ulcer Pretibial Distal;Right (Active)   Wound Image   01/03/22 0862   Wound Description Granulation tissue;Slough; Yellow 01/03/22 0834   Angelika-wound Assessment Intact; Hyperpigmented;Dry;Scaly 01/03/22 0834   Wound Length (cm) 2 1 cm 01/03/22 6061   Wound Width (cm) 1 5 cm 01/03/22 0834   Wound Depth (cm) 0 1 cm 01/03/22 0834   Wound Surface Area (cm^2) 3 15 cm^2 01/03/22 0834   Wound Volume (cm^3) 0 315 cm^3 01/03/22 0834   Calculated Wound Volume (cm^3) 0 32 cm^3 01/03/22 0834   Change in Wound Size % 23 81 01/03/22 0834   Drainage Amount Small 01/03/22 0834   Drainage Description Serosanguineous 01/03/22 0834   Non-staged Wound Description Full thickness 01/03/22 0834   Treatments Cleansed;Irrigation with NSS 12/29/21 0854   Dressing Changed Changed 12/29/21 0854   Patient Tolerance Tolerated well 12/29/21 0854   Dressing Status Intact 12/29/21 0854     Right pre tibial wound with moderate slough, very tender                    Procedures     Results from last 6 Months   Lab Units 12/15/21  0951   WOUND CULTURE  4+ Growth of Staphylococcus aureus*  3+ Growth of Beta Hemolytic Streptococcus Group C*         Wound Instructions:  Orders Placed This Encounter   Procedures    Wound cleansing and dressings     Right leg wound     Shower, rinse well and pat dry  Apply new dressing immediately, do not leave open to air     Apply medi honey to wound  Cover with GAUZE  Secure with [facility] AND TAPE  Change dressing daily     This was performed at wound care center today          Elastic Tubular Stocking SPANDAGRIP F     Tubular elastic bandage: Apply from base of toes to behind the knee  Apply in AM, may remove for sleep      Avoid prolonged standing in one place      Elevate leg(s) above the level of the heart when sitting or as much as possible             Standing Status:   Future     Standing Expiration Date:   1/3/2023         Lewis Clifford MD      Portions of the record may have been created with voice recognition software  Occasional wrong word or "sound a like" substitutions may have occurred due to the inherent limitations of voice recognition software  Read the chart carefully and recognize, using context, where substitutions have occurred

## 2022-01-03 NOTE — PATIENT INSTRUCTIONS
Orders Placed This Encounter   Procedures    Wound cleansing and dressings     Right leg wound     Shower, rinse well and pat dry  Apply new dressing immediately, do not leave open to air     Apply medi honey to wound  Cover with GAUZE  Secure with [facility] AND TAPE  Change dressing daily     This was performed at wound care center today          Elastic Tubular Stocking SPANDAGRIP F     Tubular elastic bandage: Apply from base of toes to behind the knee   Apply in AM, may remove for sleep      Avoid prolonged standing in one place      Elevate leg(s) above the level of the heart when sitting or as much as possible             Standing Status:   Future     Standing Expiration Date:   1/3/2023

## 2022-01-11 ENCOUNTER — HOSPITAL ENCOUNTER (INPATIENT)
Facility: HOSPITAL | Age: 62
LOS: 3 days | Discharge: HOME | DRG: 871 | End: 2022-01-14
Attending: EMERGENCY MEDICINE | Admitting: INTERNAL MEDICINE
Payer: COMMERCIAL

## 2022-01-11 ENCOUNTER — APPOINTMENT (EMERGENCY)
Dept: RADIOLOGY | Facility: HOSPITAL | Age: 62
DRG: 871 | End: 2022-01-11
Payer: COMMERCIAL

## 2022-01-11 ENCOUNTER — OFFICE VISIT (OUTPATIENT)
Dept: VASCULAR SURGERY | Facility: CLINIC | Age: 62
End: 2022-01-11
Payer: COMMERCIAL

## 2022-01-11 VITALS
HEIGHT: 62 IN | HEART RATE: 91 BPM | SYSTOLIC BLOOD PRESSURE: 160 MMHG | TEMPERATURE: 104 F | DIASTOLIC BLOOD PRESSURE: 90 MMHG | WEIGHT: 200 LBS | BODY MASS INDEX: 36.8 KG/M2

## 2022-01-11 DIAGNOSIS — I87.311 CHRONIC VENOUS HYPERTENSION (IDIOPATHIC) WITH ULCER OF RIGHT LOWER EXTREMITY (HCC): ICD-10-CM

## 2022-01-11 DIAGNOSIS — I83.019 VENOUS ULCER OF RIGHT LOWER EXTREMITY WITH VARICOSE VEINS (HCC): ICD-10-CM

## 2022-01-11 DIAGNOSIS — L97.919 VENOUS ULCER OF RIGHT LOWER EXTREMITY WITH VARICOSE VEINS (HCC): ICD-10-CM

## 2022-01-11 DIAGNOSIS — L97.909 LEG ULCER (HCC): Primary | ICD-10-CM

## 2022-01-11 DIAGNOSIS — I87.311 CHRONIC VENOUS HYPERTENSION (IDIOPATHIC) WITH ULCER OF RIGHT LOWER EXTREMITY (HCC): Primary | ICD-10-CM

## 2022-01-11 DIAGNOSIS — L03.115 CELLULITIS OF RIGHT ANTERIOR LOWER LEG: Primary | ICD-10-CM

## 2022-01-11 DIAGNOSIS — L97.919 CHRONIC VENOUS HYPERTENSION (IDIOPATHIC) WITH ULCER OF RIGHT LOWER EXTREMITY (HCC): ICD-10-CM

## 2022-01-11 DIAGNOSIS — L03.90 CELLULITIS: ICD-10-CM

## 2022-01-11 DIAGNOSIS — L97.919 CHRONIC VENOUS HYPERTENSION (IDIOPATHIC) WITH ULCER OF RIGHT LOWER EXTREMITY (HCC): Primary | ICD-10-CM

## 2022-01-11 PROBLEM — U07.1 COVID-19: Status: ACTIVE | Noted: 2022-01-11

## 2022-01-11 PROBLEM — A41.9 SEPSIS (HCC): Status: ACTIVE | Noted: 2022-01-11

## 2022-01-11 PROBLEM — E87.6 HYPOKALEMIA: Status: ACTIVE | Noted: 2022-01-11

## 2022-01-11 PROBLEM — R74.8 ELEVATED LIVER ENZYMES: Status: ACTIVE | Noted: 2022-01-11

## 2022-01-11 LAB
2HR DELTA HS TROPONIN: 0 NG/L
ALBUMIN SERPL BCP-MCNC: 3.4 G/DL (ref 3.5–5)
ALBUMIN SERPL BCP-MCNC: 3.7 G/DL (ref 3.5–5)
ALP SERPL-CCNC: 141 U/L (ref 46–116)
ALP SERPL-CCNC: 151 U/L (ref 46–116)
ALT SERPL W P-5'-P-CCNC: 73 U/L (ref 12–78)
ALT SERPL W P-5'-P-CCNC: 84 U/L (ref 12–78)
ANION GAP SERPL CALCULATED.3IONS-SCNC: 5 MMOL/L (ref 4–13)
ANION GAP SERPL CALCULATED.3IONS-SCNC: 9 MMOL/L (ref 4–13)
AST SERPL W P-5'-P-CCNC: 48 U/L (ref 5–45)
AST SERPL W P-5'-P-CCNC: 60 U/L (ref 5–45)
BASOPHILS # BLD AUTO: 0.01 THOUSANDS/ΜL (ref 0–0.1)
BASOPHILS # BLD AUTO: 0.01 THOUSANDS/ΜL (ref 0–0.1)
BASOPHILS NFR BLD AUTO: 0 % (ref 0–1)
BASOPHILS NFR BLD AUTO: 0 % (ref 0–1)
BILIRUB SERPL-MCNC: 0.36 MG/DL (ref 0.2–1)
BILIRUB SERPL-MCNC: 1.06 MG/DL (ref 0.2–1)
BUN SERPL-MCNC: 20 MG/DL (ref 5–25)
BUN SERPL-MCNC: 21 MG/DL (ref 5–25)
CALCIUM ALBUM COR SERPL-MCNC: 9.1 MG/DL (ref 8.3–10.1)
CALCIUM SERPL-MCNC: 8.6 MG/DL (ref 8.3–10.1)
CALCIUM SERPL-MCNC: 9.9 MG/DL (ref 8.3–10.1)
CARDIAC TROPONIN I PNL SERPL HS: 21 NG/L
CARDIAC TROPONIN I PNL SERPL HS: 21 NG/L
CHLORIDE SERPL-SCNC: 101 MMOL/L (ref 100–108)
CHLORIDE SERPL-SCNC: 101 MMOL/L (ref 100–108)
CO2 SERPL-SCNC: 24 MMOL/L (ref 21–32)
CO2 SERPL-SCNC: 26 MMOL/L (ref 21–32)
CREAT SERPL-MCNC: 1.12 MG/DL (ref 0.6–1.3)
CREAT SERPL-MCNC: 1.16 MG/DL (ref 0.6–1.3)
CRP SERPL QL: 78.1 MG/L
D DIMER PPP FEU-MCNC: 0.61 UG/ML FEU
EOSINOPHIL # BLD AUTO: 0 THOUSAND/ΜL (ref 0–0.61)
EOSINOPHIL # BLD AUTO: 0 THOUSAND/ΜL (ref 0–0.61)
EOSINOPHIL NFR BLD AUTO: 0 % (ref 0–6)
EOSINOPHIL NFR BLD AUTO: 0 % (ref 0–6)
ERYTHROCYTE [DISTWIDTH] IN BLOOD BY AUTOMATED COUNT: 13.2 % (ref 11.6–15.1)
ERYTHROCYTE [DISTWIDTH] IN BLOOD BY AUTOMATED COUNT: 13.2 % (ref 11.6–15.1)
ERYTHROCYTE [SEDIMENTATION RATE] IN BLOOD: 40 MM/HOUR (ref 0–29)
FLUAV RNA RESP QL NAA+PROBE: NEGATIVE
FLUBV RNA RESP QL NAA+PROBE: NEGATIVE
GFR SERPL CREATININE-BSD FRML MDRD: 50 ML/MIN/1.73SQ M
GFR SERPL CREATININE-BSD FRML MDRD: 53 ML/MIN/1.73SQ M
GLUCOSE SERPL-MCNC: 95 MG/DL (ref 65–140)
GLUCOSE SERPL-MCNC: 96 MG/DL (ref 65–140)
HCT VFR BLD AUTO: 42 % (ref 34.8–46.1)
HCT VFR BLD AUTO: 46 % (ref 34.8–46.1)
HGB BLD-MCNC: 13.8 G/DL (ref 11.5–15.4)
HGB BLD-MCNC: 15.4 G/DL (ref 11.5–15.4)
HIV 1+2 AB+HIV1 P24 AG SERPL QL IA: NORMAL
HIV1 P24 AG SER QL: NORMAL
IMM GRANULOCYTES # BLD AUTO: 0.02 THOUSAND/UL (ref 0–0.2)
IMM GRANULOCYTES # BLD AUTO: 0.02 THOUSAND/UL (ref 0–0.2)
IMM GRANULOCYTES NFR BLD AUTO: 0 % (ref 0–2)
IMM GRANULOCYTES NFR BLD AUTO: 0 % (ref 0–2)
LACTATE SERPL-SCNC: 1.6 MMOL/L (ref 0.5–2)
LYMPHOCYTES # BLD AUTO: 0.62 THOUSANDS/ΜL (ref 0.6–4.47)
LYMPHOCYTES # BLD AUTO: 0.75 THOUSANDS/ΜL (ref 0.6–4.47)
LYMPHOCYTES NFR BLD AUTO: 11 % (ref 14–44)
LYMPHOCYTES NFR BLD AUTO: 15 % (ref 14–44)
MCH RBC QN AUTO: 30.3 PG (ref 26.8–34.3)
MCH RBC QN AUTO: 30.5 PG (ref 26.8–34.3)
MCHC RBC AUTO-ENTMCNC: 32.9 G/DL (ref 31.4–37.4)
MCHC RBC AUTO-ENTMCNC: 33.5 G/DL (ref 31.4–37.4)
MCV RBC AUTO: 91 FL (ref 82–98)
MCV RBC AUTO: 92 FL (ref 82–98)
MONOCYTES # BLD AUTO: 0.42 THOUSAND/ΜL (ref 0.17–1.22)
MONOCYTES # BLD AUTO: 0.42 THOUSAND/ΜL (ref 0.17–1.22)
MONOCYTES NFR BLD AUTO: 7 % (ref 4–12)
MONOCYTES NFR BLD AUTO: 9 % (ref 4–12)
NEUTROPHILS # BLD AUTO: 3.67 THOUSANDS/ΜL (ref 1.85–7.62)
NEUTROPHILS # BLD AUTO: 4.64 THOUSANDS/ΜL (ref 1.85–7.62)
NEUTS SEG NFR BLD AUTO: 76 % (ref 43–75)
NEUTS SEG NFR BLD AUTO: 82 % (ref 43–75)
NRBC BLD AUTO-RTO: 0 /100 WBCS
NRBC BLD AUTO-RTO: 0 /100 WBCS
PLATELET # BLD AUTO: 148 THOUSANDS/UL (ref 149–390)
PLATELET # BLD AUTO: 162 THOUSANDS/UL (ref 149–390)
PMV BLD AUTO: 9.2 FL (ref 8.9–12.7)
PMV BLD AUTO: 9.8 FL (ref 8.9–12.7)
POTASSIUM SERPL-SCNC: 2.9 MMOL/L (ref 3.5–5.3)
POTASSIUM SERPL-SCNC: 3.5 MMOL/L (ref 3.5–5.3)
PROCALCITONIN SERPL-MCNC: 0.11 NG/ML
PROCALCITONIN SERPL-MCNC: 0.11 NG/ML
PROT SERPL-MCNC: 7.7 G/DL (ref 6.4–8.2)
PROT SERPL-MCNC: 8.5 G/DL (ref 6.4–8.2)
RBC # BLD AUTO: 4.56 MILLION/UL (ref 3.81–5.12)
RBC # BLD AUTO: 5.05 MILLION/UL (ref 3.81–5.12)
RSV RNA RESP QL NAA+PROBE: NEGATIVE
SARS-COV-2 RNA RESP QL NAA+PROBE: POSITIVE
SODIUM SERPL-SCNC: 132 MMOL/L (ref 136–145)
SODIUM SERPL-SCNC: 134 MMOL/L (ref 136–145)
WBC # BLD AUTO: 4.87 THOUSAND/UL (ref 4.31–10.16)
WBC # BLD AUTO: 5.71 THOUSAND/UL (ref 4.31–10.16)

## 2022-01-11 PROCEDURE — 87340 HEPATITIS B SURFACE AG IA: CPT | Performed by: INTERNAL MEDICINE

## 2022-01-11 PROCEDURE — 83605 ASSAY OF LACTIC ACID: CPT | Performed by: EMERGENCY MEDICINE

## 2022-01-11 PROCEDURE — 73590 X-RAY EXAM OF LOWER LEG: CPT

## 2022-01-11 PROCEDURE — 99243 OFF/OP CNSLTJ NEW/EST LOW 30: CPT | Performed by: SURGERY

## 2022-01-11 PROCEDURE — 87806 HIV AG W/HIV1&2 ANTB W/OPTIC: CPT | Performed by: INTERNAL MEDICINE

## 2022-01-11 PROCEDURE — 96375 TX/PRO/DX INJ NEW DRUG ADDON: CPT

## 2022-01-11 PROCEDURE — 84484 ASSAY OF TROPONIN QUANT: CPT | Performed by: INTERNAL MEDICINE

## 2022-01-11 PROCEDURE — 0241U HB NFCT DS VIR RESP RNA 4 TRGT: CPT | Performed by: EMERGENCY MEDICINE

## 2022-01-11 PROCEDURE — 87040 BLOOD CULTURE FOR BACTERIA: CPT | Performed by: EMERGENCY MEDICINE

## 2022-01-11 PROCEDURE — 80053 COMPREHEN METABOLIC PANEL: CPT | Performed by: INTERNAL MEDICINE

## 2022-01-11 PROCEDURE — 85025 COMPLETE CBC W/AUTO DIFF WBC: CPT | Performed by: INTERNAL MEDICINE

## 2022-01-11 PROCEDURE — 85652 RBC SED RATE AUTOMATED: CPT | Performed by: EMERGENCY MEDICINE

## 2022-01-11 PROCEDURE — 86803 HEPATITIS C AB TEST: CPT | Performed by: INTERNAL MEDICINE

## 2022-01-11 PROCEDURE — 85379 FIBRIN DEGRADATION QUANT: CPT | Performed by: INTERNAL MEDICINE

## 2022-01-11 PROCEDURE — 99285 EMERGENCY DEPT VISIT HI MDM: CPT

## 2022-01-11 PROCEDURE — XW033E5 INTRODUCTION OF REMDESIVIR ANTI-INFECTIVE INTO PERIPHERAL VEIN, PERCUTANEOUS APPROACH, NEW TECHNOLOGY GROUP 5: ICD-10-PCS | Performed by: INTERNAL MEDICINE

## 2022-01-11 PROCEDURE — 84145 PROCALCITONIN (PCT): CPT | Performed by: INTERNAL MEDICINE

## 2022-01-11 PROCEDURE — 86704 HEP B CORE ANTIBODY TOTAL: CPT | Performed by: INTERNAL MEDICINE

## 2022-01-11 PROCEDURE — 99285 EMERGENCY DEPT VISIT HI MDM: CPT | Performed by: EMERGENCY MEDICINE

## 2022-01-11 PROCEDURE — 96365 THER/PROPH/DIAG IV INF INIT: CPT

## 2022-01-11 PROCEDURE — 85025 COMPLETE CBC W/AUTO DIFF WBC: CPT | Performed by: EMERGENCY MEDICINE

## 2022-01-11 PROCEDURE — 96367 TX/PROPH/DG ADDL SEQ IV INF: CPT

## 2022-01-11 PROCEDURE — 86705 HEP B CORE ANTIBODY IGM: CPT | Performed by: INTERNAL MEDICINE

## 2022-01-11 PROCEDURE — 99223 1ST HOSP IP/OBS HIGH 75: CPT | Performed by: INTERNAL MEDICINE

## 2022-01-11 PROCEDURE — 86140 C-REACTIVE PROTEIN: CPT | Performed by: EMERGENCY MEDICINE

## 2022-01-11 PROCEDURE — 80053 COMPREHEN METABOLIC PANEL: CPT | Performed by: EMERGENCY MEDICINE

## 2022-01-11 PROCEDURE — 36415 COLL VENOUS BLD VENIPUNCTURE: CPT | Performed by: EMERGENCY MEDICINE

## 2022-01-11 RX ORDER — MORPHINE SULFATE 4 MG/ML
4 INJECTION, SOLUTION INTRAMUSCULAR; INTRAVENOUS ONCE
Status: COMPLETED | OUTPATIENT
Start: 2022-01-11 | End: 2022-01-11

## 2022-01-11 RX ORDER — CIPROFLOXACIN 2 MG/ML
400 INJECTION, SOLUTION INTRAVENOUS EVERY 8 HOURS
Status: DISCONTINUED | OUTPATIENT
Start: 2022-01-11 | End: 2022-01-11

## 2022-01-11 RX ORDER — ONDANSETRON 2 MG/ML
4 INJECTION INTRAMUSCULAR; INTRAVENOUS EVERY 6 HOURS PRN
Status: DISCONTINUED | OUTPATIENT
Start: 2022-01-11 | End: 2022-01-14 | Stop reason: HOSPADM

## 2022-01-11 RX ORDER — SODIUM CHLORIDE 9 MG/ML
50 INJECTION, SOLUTION INTRAVENOUS CONTINUOUS
Status: DISCONTINUED | OUTPATIENT
Start: 2022-01-11 | End: 2022-01-14

## 2022-01-11 RX ORDER — SENNOSIDES 8.6 MG
1 TABLET ORAL DAILY
Status: DISCONTINUED | OUTPATIENT
Start: 2022-01-12 | End: 2022-01-14 | Stop reason: HOSPADM

## 2022-01-11 RX ORDER — MAGNESIUM HYDROXIDE/ALUMINUM HYDROXICE/SIMETHICONE 120; 1200; 1200 MG/30ML; MG/30ML; MG/30ML
30 SUSPENSION ORAL EVERY 6 HOURS PRN
Status: DISCONTINUED | OUTPATIENT
Start: 2022-01-11 | End: 2022-01-14 | Stop reason: HOSPADM

## 2022-01-11 RX ORDER — POTASSIUM CHLORIDE 20 MEQ/1
40 TABLET, EXTENDED RELEASE ORAL ONCE
Status: COMPLETED | OUTPATIENT
Start: 2022-01-11 | End: 2022-01-11

## 2022-01-11 RX ORDER — OXYCODONE HYDROCHLORIDE 5 MG/1
TABLET ORAL
Status: ON HOLD | COMMUNITY
Start: 2021-12-31 | End: 2022-01-26 | Stop reason: SDUPTHER

## 2022-01-11 RX ORDER — OXYCODONE HYDROCHLORIDE 5 MG/1
5 TABLET ORAL EVERY 4 HOURS PRN
Status: DISCONTINUED | OUTPATIENT
Start: 2022-01-11 | End: 2022-01-14 | Stop reason: HOSPADM

## 2022-01-11 RX ORDER — CIPROFLOXACIN 2 MG/ML
400 INJECTION, SOLUTION INTRAVENOUS EVERY 8 HOURS
Status: DISCONTINUED | OUTPATIENT
Start: 2022-01-12 | End: 2022-01-12

## 2022-01-11 RX ORDER — ACETAMINOPHEN 325 MG/1
650 TABLET ORAL EVERY 6 HOURS PRN
Status: DISCONTINUED | OUTPATIENT
Start: 2022-01-11 | End: 2022-01-14 | Stop reason: HOSPADM

## 2022-01-11 RX ORDER — HYDRALAZINE HYDROCHLORIDE 20 MG/ML
5 INJECTION INTRAMUSCULAR; INTRAVENOUS EVERY 6 HOURS PRN
Status: DISCONTINUED | OUTPATIENT
Start: 2022-01-11 | End: 2022-01-14 | Stop reason: HOSPADM

## 2022-01-11 RX ORDER — CEFAZOLIN SODIUM 2 G/50ML
2000 SOLUTION INTRAVENOUS EVERY 8 HOURS
Status: DISCONTINUED | OUTPATIENT
Start: 2022-01-11 | End: 2022-01-12

## 2022-01-11 RX ORDER — DOCUSATE SODIUM 100 MG/1
100 CAPSULE, LIQUID FILLED ORAL 2 TIMES DAILY
Status: DISCONTINUED | OUTPATIENT
Start: 2022-01-11 | End: 2022-01-14 | Stop reason: HOSPADM

## 2022-01-11 RX ADMIN — MORPHINE SULFATE 4 MG: 4 INJECTION INTRAVENOUS at 17:09

## 2022-01-11 RX ADMIN — CEFAZOLIN SODIUM 2000 MG: 2 SOLUTION INTRAVENOUS at 17:41

## 2022-01-11 RX ADMIN — CIPROFLOXACIN 400 MG: 2 INJECTION, SOLUTION INTRAVENOUS at 18:21

## 2022-01-11 RX ADMIN — REMDESIVIR 200 MG: 100 INJECTION, POWDER, LYOPHILIZED, FOR SOLUTION INTRAVENOUS at 20:20

## 2022-01-11 RX ADMIN — DOCUSATE SODIUM 100 MG: 100 CAPSULE ORAL at 19:58

## 2022-01-11 RX ADMIN — SODIUM CHLORIDE 50 ML/HR: 0.9 INJECTION, SOLUTION INTRAVENOUS at 19:58

## 2022-01-11 RX ADMIN — POTASSIUM CHLORIDE 40 MEQ: 1500 TABLET, EXTENDED RELEASE ORAL at 22:00

## 2022-01-11 RX ADMIN — OXYCODONE HYDROCHLORIDE 5 MG: 5 TABLET ORAL at 20:16

## 2022-01-11 NOTE — ASSESSMENT & PLAN NOTE
Presents today in the office with very painful leg ulceration in the distal right leg anterior shin region approximately 5 x 3 centimeters  She also has a fever over 100 and is having severe pain in that leg  She was recently hospitalized and discharged and then follow up in wound care and has been on multiple antibiotic therapies  In view of her fever and severe pain as well as infection I am sending her to the emergency room in the [address] [place] [address] for further evaluation will likely need to be admitted on IV antibiotics and wound care      Plan:  Patient being directed to [address] [place] [address] for fever infected wound right leg in need of antibiotics and pain management as well as possible rule out COVID 19

## 2022-01-11 NOTE — PATIENT INSTRUCTIONS
Presents today in the office with very painful leg ulceration in the distal right leg anterior shin region approximately 5 x 3 centimeters  She also has a fever over 100 and is having severe pain in that leg  She was recently hospitalized and discharged and then follow up in wound care and has been on multiple antibiotic therapies  In view of her fever and severe pain as well as infection I am sending her to the emergency room in the [facility] for further evaluation will likely need to be admitted on IV antibiotics and wound care      Plan:  Patient being directed to [facility] for fever infected wound right leg in need of antibiotics and pain management as well as possible rule out COVID 19

## 2022-01-11 NOTE — ASSESSMENT & PLAN NOTE
Presents today in the office with very painful leg ulceration in the distal right leg anterior shin region approximately 5 x 3 centimeters  She also has a fever over 100 and is having severe pain in that leg  She was recently hospitalized and discharged and then follow up in wound care and has been on multiple antibiotic therapies  In view of her fever and severe pain as well as infection I am sending her to the emergency room in the [facility] for further evaluation will likely need to be admitted on IV antibiotics and wound care      Plan:  Patient being directed to [facility] for fever infected wound right leg in need of antibiotics and pain management as well as possible rule out COVID 19

## 2022-01-11 NOTE — LETTER
January 11, 2022     [address] [facility]  [address] [facility]    Patient: Ruiz Davis   YOB: 1960   Date of Visit: 1/11/2022       Dear Dr Mendoza He: Thank you for referring Ruiz Davis to me for evaluation  Below are my notes for this consultation  If you have questions, please do not hesitate to call me  I look forward to following your patient along with you           Sincerely,        Roscoe Parrish MD        CC: Caitlin Sanchez MD

## 2022-01-11 NOTE — SEPSIS NOTE
Sepsis Note   Rufus Sandhoff 64 y o  female MRN: [id number]  Unit/Bed#: ED 01 Encounter: [id number]       qSOFA     [address] Name 01/11/22 1712 01/11/22 1636             Altered mental status GCS < 15 -- --       Respiratory Rate > / =35 1 0       Systolic BP < / =486 0 0       Q Sofa Score 1 0                  Initial Sepsis Screening     Row Name 01/11/22 1846                Is the patient's history suggestive of a new or worsening infection? Yes (Proceed)  -VB        Suspected source of infection wound infection;soft tissue  -VB        Are two or more of the following signs & symptoms of infection both present and new to the patient? No  -VB        Indicate SIRS criteria Tachycardia > 90 bpm  -VB        If the answer is yes to both questions, suspicion of sepsis is present --        If severe sepsis is present AND tissue hypoperfusion perists in the hour after fluid resuscitation or lactate > 4, the patient meets criteria for SEPTIC SHOCK --        Are any of the following organ dysfunction criteria present within 6 hours of suspected infection and SIRS criteria that are NOT considered to be chronic conditions?  --        Organ dysfunction --        Date of presentation of severe sepsis --        Time of presentation of severe sepsis --        Tissue hypoperfusion persists in the hour after crystalloid fluid administration, evidenced, by either: --        Was hypotension present within one hour of the conclusion of crystalloid fluid administration? --        Date of presentation of septic shock --        Time of presentation of septic shock --              User Key  (r) = Recorded By, (t) = Taken By, (c) = Cosigned By    [address] Name Provider Type    VB Paul Colbert MD Physician

## 2022-01-11 NOTE — PROGRESS NOTES
Assessment/Plan:    Presents today in the office with very painful leg ulceration in the distal right leg anterior shin region approximately 5 x 3 centimeters  She also has a fever over 100 and is having severe pain in that leg  She was recently hospitalized and discharged and then follow up in wound care and has been on multiple antibiotic therapies  In view of her fever and severe pain as well as infection I am sending her to the emergency room in the [facility] for further evaluation will likely need to be admitted on IV antibiotics and wound care  Plan:  Patient being directed to [facility] for fever infected wound right leg in need of antibiotics and pain management as well as possible rule out COVID 19     Diagnoses and all orders for this visit:    Cellulitis of right anterior lower leg    Chronic venous hypertension (idiopathic) with ulcer of right lower extremity ([address] )  -     Ambulatory referral to Vascular Surgery    Venous ulcer of right lower extremity with varicose veins (HCC)    Other orders  -     oxyCODONE (ROXICODONE) 5 immediate release tablet; take 1 tablet by mouth every 6 hours if needed for pain MAXIMUM DAILY DOSE OF 4 tablets        Subjective:      Patient ID: Bridgett Alcantar is a 64 y o  female  Pt is here for hospital follow up visit  Pt had a LEVDR and JAYA done  Pt have a ulcer on the right lower extremity, she is going to wound care once every other week and daughter do daily dressing change at home  Pt c/o of pain and states that the ulcer is not healing  She is a non smoker  HPI    The following portions of the patient's history were reviewed and updated as appropriate: allergies, current medications, past family history, past medical history, past social history, past surgical history and problem list     Review of Systems   Constitutional: Negative  HENT: Negative  Eyes: Negative  Respiratory: Negative      Cardiovascular: Positive for leg swelling  Gastrointestinal: Negative  Endocrine: Negative  Genitourinary: Negative  Musculoskeletal: Negative  Skin: Positive for wound  Allergic/Immunologic: Negative  Neurological: Negative  Hematological: Negative  Psychiatric/Behavioral: Negative  Objective: There were no vitals taken for this visit  Physical Exam  right lower extremity anterior shin wound approximately 5 x 3 centimeters with cellulitis and pain  There is foul odor coming from the wound  She has an easily palpable dorsalis pedis pulse  I have reviewed and made appropriate changes to the review of systems input by the medical assistant  Vitals:    01/11/22 1501   BP: 160/90   BP Location: Left arm   Patient Position: Sitting   Cuff Size: Standard   Pulse: 91   Temp: (!) 104 °F (40 °C)   TempSrc: Tympanic   Weight: 90 7 kg (200 lb)   Height: 5' 2" (1 575 m)       Patient Active Problem List   Diagnosis    Cellulitis of right anterior lower leg    Primary hypertension    Venous ulcer of right lower extremity with varicose veins (HCC)    Chronic venous hypertension (idiopathic) with ulcer of right lower extremity ([address] )       History reviewed  No pertinent surgical history      Family History   Problem Relation Age of Onset    No Known Problems Mother     No Known Problems Father     No Known Problems Sister     No Known Problems Brother     No Known Problems Son     No Known Problems Daughter     No Known Problems Maternal Grandmother     No Known Problems Maternal Grandfather     No Known Problems Paternal Grandmother     No Known Problems Paternal Grandfather     No Known Problems Maternal Aunt     No Known Problems Maternal Uncle     No Known Problems Paternal Aunt     No Known Problems Paternal Uncle     No Known Problems Cousin        Social History     Socioeconomic History    Marital status: /Civil Union     Spouse name: Not on file    Number of children: Not on file    Years of education: Not on file    Highest education level: Not on file   Occupational History    Not on file   Tobacco Use    Smoking status: Never Smoker    Smokeless tobacco: Never Used   Vaping Use    Vaping Use: Never used   Substance and Sexual Activity    Alcohol use: Not Currently    Drug use: Never    Sexual activity: Not Currently   Other Topics Concern    Not on file   Social History Narrative    Not on file     Social Determinants of Health     Financial Resource Strain: Not on file   Food Insecurity: No Food Insecurity    Worried About Running Out of Food in the Last Year: Never true    Bryce of Food in the Last Year: Never true   Transportation Needs: No Transportation Needs    Lack of Transportation (Medical): No    Lack of Transportation (Non-Medical):  No   Physical Activity: Not on file   Stress: Not on file   Social Connections: Not on file   Intimate Partner Violence: Not on file   Housing Stability: Low Risk     Unable to Pay for Housing in the Last Year: No    Number of Places Lived in the Last Year: 1    Unstable Housing in the Last Year: No       Allergies   Allergen Reactions    Wound Dressing Adhesive Rash         Current Outpatient Medications:     oxyCODONE (ROXICODONE) 5 immediate release tablet, take 1 tablet by mouth every 6 hours if needed for pain MAXIMUM DAILY DOSE OF 4 tablets, Disp: , Rfl:     Acetaminophen 325 MG CAPS, Take 3 capsules (975 mg total) by mouth every 8 (eight) hours as needed (mild pain) (Patient not taking: Reported on 1/11/2022 ), Disp: 30 capsule, Rfl: 0

## 2022-01-11 NOTE — ED PROVIDER NOTES
Final Diagnosis:  1  Leg ulcer ([address] )    2  Cellulitis; failure of outpatient management      ED Course as of 01/11/22 2042 Tue Jan 11, 2022   1726 Temperature(!): 101 5 °F (38 6 °C)   1806 C-REACTIVE PROTEIN(!): 78 1   1844 Updated patient about labs, imaging  Discussed admission  Chief Complaint   Patient presents with    Wound Check     pt sent from dr dominguez's office for evaluation of venous wound on right leg     This is a 64 y o  old female presenting for evaluation of the following: RIGHT anterior tibial ulceration  The patient has had this wound since the beginning of December; it's been gradually getting worse  She has had no measured fevers  No nausea/vomiting  It's been becoming increasingly painful  Patient states that she has had multiple course of antibiotics without effect  She's been hospitalized for this wound and initially treated with vanco/zosyn  MRI was done without abscess  Patient was then treated with augmentin / doxycycline x7 days after DC (finishing January 4th)  She had cultures that were multi-microbial (MSSA, Group C Strep)  After finishing the antibiotics, the redness came back and is more well defined / bigger  This is a patient with history of Nena Ahr / JAYA done in the past for similar wound and patient states that the last time, it resolved the wound after she had it done  Discussed patient with Dr Morenita Pereira directly: "Vascular consult not necessary, she has easily palpable dorsal pedis pulse  Its for ID consult and potentially Wound Care as Dr Taylor Aggarwal follows her, I was concerned that she had Covid as well "     Further, while hospitalized, the patient had a VAS duplex done that demonstrated diffuse disease  Will send off COVID test      PMH:   has no past medical history on file  PSH:   has no past surgical history on file      Social:  Social History     Substance and Sexual Activity   Alcohol Use Not Currently     Social History     Tobacco Use Smoking Status Never Smoker   Smokeless Tobacco Never Used     Social History     Substance and Sexual Activity   Drug Use Never     PE:   Vitals:    01/11/22 1711 01/11/22 1712 01/11/22 1834 01/11/22 2022   BP:  154/88  151/72   BP Location:  Right arm     Pulse:  94  84   Resp:  22  14   Temp: (!) 101 5 °F (38 6 °C)  99 6 °F (37 6 °C)    TempSrc: Oral  Oral    SpO2:  99%  94%   General: VSS, NAD, awake, alert  Well-nourished, well-developed  Appears stated age  Head: Normocephalic, atraumatic, nontender  Eyes: PERRL, EOM-I  No diplopia  No hyphema  No subconjunctival hemorrhages  Symmetrical lids  ENTAtraumatic external nose and ears  MMM  No stridor  Normal phonation  No drooling  Base of mouth is soft  No mastoid tenderness  Neck: Symmetric, trachea midline  No JVD  CV: Peripheral pulses +2 throughout  No chest wall tenderness  Lungs:   Unlabored   No retractions  No crepitus  No tachypnea  No paradoxical motion  MSK:   FROM   No lower extremity edema  Skin: There's an anterior distal tibial ulceration  It's 5x3cm  It's VERY tender with the patient jumping nearly out of bed whenever it is lightly touched  No foul smell  +serrosanguinous discharge (small amount) present  There's surrounding redness that blanches to the touch that is hyper-esthetic  There's a palpable DP pulse present  When I elevate the leg, the redness does not go away    Neuro: AAOx3, GCS 15, CN II-XII grossly intact  Motor grossly intact  Psychiatric/Behavioral: Appropriate mood and affect   Exam: deferred  A:  - Leg cellulitis, failure of outpatient antibiotics  P:  - Cellulitis that is failing outpatient management, getting worse  - Will start ancef / cipro  - 13 point ROS was performed and all are normal unless stated in the history above  - Nursing note reviewed  Vitals reviewed     - Orders placed by myself and/or advanced practitioner / resident     - Previous chart was reviewed  - No language barrier    - History obtained from patient  - There are no limitations to the history obtained  - Critical care time: Not applicable for this patient  Medications   ceFAZolin (ANCEF) IVPB (premix in dextrose) 2,000 mg 50 mL (0 mg Intravenous Stopped 1/11/22 1811)   oxyCODONE (ROXICODONE) IR tablet 5 mg (5 mg Oral Given 1/11/22 2016)   sodium chloride 0 9 % infusion (50 mL/hr Intravenous New Bag 1/11/22 1958)   acetaminophen (TYLENOL) tablet 650 mg (has no administration in time range)   docusate sodium (COLACE) capsule 100 mg (100 mg Oral Given 1/11/22 1958)   senna (SENOKOT) tablet 8 6 mg (has no administration in time range)   ondansetron (ZOFRAN) injection 4 mg (has no administration in time range)   aluminum-magnesium hydroxide-simethicone (MYLANTA) oral suspension 30 mL (has no administration in time range)   enoxaparin (LOVENOX) subcutaneous injection 40 mg (has no administration in time range)   remdesivir (Veklury) 200 mg in sodium chloride 0 9 % 290 mL IVPB (200 mg Intravenous Given 1/11/22 2020)     Followed by   remdesivir Brandie Longs) 100 mg in sodium chloride 0 9 % 270 mL IVPB (has no administration in time range)   ciprofloxacin (CIPRO) IVPB (premix in 5% dextrose) 400 mg 200 mL (has no administration in time range)   hydrALAZINE (APRESOLINE) injection 5 mg (has no administration in time range)   morphine (PF) 4 mg/mL injection 4 mg (4 mg Intravenous Given 1/11/22 1709)     XR tibia fibula 2 views RIGHT   ED Interpretation   Abnormal   Hardware in knee  No obvious air tracking up from the ulcer on her anterior distal tibia         US abdomen complete    (Results Pending)     Orders Placed This Encounter   Procedures    COVID/FLU/RSV - 2 hour TAT    Blood culture #1    Blood culture #2    XR tibia fibula 2 views RIGHT    US abdomen complete    CBC and differential    Comprehensive metabolic panel    Lactic acid    Sedimentation rate, automated    C-reactive protein  Procalcitonin with AM Reflex    Comprehensive metabolic panel    Magnesium    Phosphorus    CBC and differential    CBC and differential    Comprehensive metabolic panel    Procalcitonin with AM Reflex    Procalcitonin with AM Reflex    C-reactive protein    HS Troponin 0hr (reflex protocol)    CK (with reflex to MB)    Chronic Hepatitis Panel    Rapid HIV 1/2 AB-AG Combo    D-dimer, quantitative    HS Troponin I 2hr    HS Troponin I 4hr    Procalcitonin Reflex    Diet Regular; Regular House    Insert peripheral IV    Nursing Communication Please draw a blood culture with IV placement in case if needed   Nursing communication Continue IV as ordered  Lorena Diones Notify admitting physician    Notify admitting physician on arrival    Nursing oxygen orders / instructions    Activity as tolerated    Bathroom privileges    Vital Signs per unit routine    Up and OOB as tolerated    Up with assistance    Daily weights    I/O    Insert peripheral IV    Maintain IV access    Notify Provider for Increasing O2    Incentive spirometry    Titrate O2 (oxygen) to keep saturation at   Lorena Diones Activity as tolerated    Ambulate patient    Level 1-Full Code: all life saving measures are indicated    Inpatient consult to Infectious Diseases    Contact and airborne isolation status    OT eval and treat    PT eval and treat    INPATIENT ADMISSION     Labs Reviewed   COVID19, INFLUENZA A/B, RSV PCR, [facility] - Abnormal       Result Value Ref Range Status    SARS-CoV-2 Positive (*) Negative Final    Comment:      INFLUENZA A PCR Negative  Negative Final    Comment:      INFLUENZA B PCR Negative  Negative Final    Comment:      RSV PCR Negative  Negative Final    Comment:      Narrative:     FOR PEDIATRIC PATIENTS - copy/paste COVID Guidelines URL to browser: [email or web address]  [email or web address]    SARS-CoV-2 assay is a Nucleic Acid Amplification assay intended for the  qualitative detection of nucleic acid from SARS-CoV-2 in nasopharyngeal  swabs  Results are for the presumptive identification of SARS-CoV-2 RNA  Positive results are indicative of infection with SARS-CoV-2, the virus  causing COVID-19, but do not rule out bacterial infection or co-infection  with other viruses  Laboratories within the [place] and its  territories are required to report all positive results to the appropriate  public health authorities  Negative results do not preclude SARS-CoV-2  infection and should not be used as the sole basis for treatment or other  patient management decisions  Negative results must be combined with  clinical observations, patient history, and epidemiological information  This test has not been FDA cleared or approved  This test has been authorized by FDA under an Emergency Use Authorization  (EUA)  This test is only authorized for the duration of time the  declaration that circumstances exist justifying the authorization of the  emergency use of an in vitro diagnostic tests for detection of SARS-CoV-2  virus and/or diagnosis of COVID-19 infection under section 564(b)(1) of  the Act, 21 U  S C  823DPI-9(W)(8), unless the authorization is terminated  or revoked sooner  The test has been validated but independent review by FDA  and CLIA is pending  Test performed using [employer] GeneXpert: This RT-PCR assay targets N2,  a region unique to SARS-CoV-2  A conserved region in the E-gene was chosen  for pan-Sarbecovirus detection which includes SARS-CoV-2     CBC AND DIFFERENTIAL - Abnormal    WBC 5 71  4 31 - 10 16 Thousand/uL Final    RBC 5 05  3 81 - 5 12 Million/uL Final    Hemoglobin 15 4  11 5 - 15 4 g/dL Final    Hematocrit 46 0  34 8 - 46 1 % Final    MCV 91  82 - 98 fL Final    MCH 30 5  26 8 - 34 3 pg Final    MCHC 33 5  31 4 - 37 4 g/dL Final    RDW 13 2  11 6 - 15 1 % Final    MPV 9 8  8 9 - 12 7 fL Final    Platelets 332  017 - 390 Thousands/uL Final nRBC 0  /100 WBCs Final    Neutrophils Relative 82 (*) 43 - 75 % Final    Immat GRANS % 0  0 - 2 % Final    Lymphocytes Relative 11 (*) 14 - 44 % Final    Monocytes Relative 7  4 - 12 % Final    Eosinophils Relative 0  0 - 6 % Final    Basophils Relative 0  0 - 1 % Final    Neutrophils Absolute 4 64  1 85 - 7 62 Thousands/µL Final    Immature Grans Absolute 0 02  0 00 - 0 20 Thousand/uL Final    Lymphocytes Absolute 0 62  0 60 - 4 47 Thousands/µL Final    Monocytes Absolute 0 42  0 17 - 1 22 Thousand/µL Final    Eosinophils Absolute 0 00  0 00 - 0 61 Thousand/µL Final    Basophils Absolute 0 01  0 00 - 0 10 Thousands/µL Final   COMPREHENSIVE METABOLIC PANEL - Abnormal    Sodium 134 (*) 136 - 145 mmol/L Final    Potassium 3 5  3 5 - 5 3 mmol/L Final    Comment: Slightly Hemolyzed; Results May be Affected    Chloride 101  100 - 108 mmol/L Final    CO2 24  21 - 32 mmol/L Final    ANION GAP 9  4 - 13 mmol/L Final    BUN 21  5 - 25 mg/dL Final    Creatinine 1 16  0 60 - 1 30 mg/dL Final    Comment: Standardized to IDMS reference method    Glucose 95  65 - 140 mg/dL Final    Comment: If the patient is fasting, the ADA then defines impaired fasting glucose as > 100 mg/dL and diabetes as > or equal to 123 mg/dL  Specimen collection should occur prior to Sulfasalazine administration due to the potential for falsely depressed results  Specimen collection should occur prior to Sulfapyridine administration due to the potential for falsely elevated results  Calcium 9 9  8 3 - 10 1 mg/dL Final    AST 60 (*) 5 - 45 U/L Final    Comment: Slightly Hemolyzed; Results May be Affected  Specimen collection should occur prior to Sulfasalazine administration due to the potential for falsely depressed results  ALT 84 (*) 12 - 78 U/L Final    Comment: Specimen collection should occur prior to Sulfasalazine and/or Sulfapyridine administration due to the potential for falsely depressed results       Alkaline Phosphatase 151 (*) 46 - 116 U/L Final    Total Protein 8 5 (*) 6 4 - 8 2 g/dL Final    Albumin 3 7  3 5 - 5 0 g/dL Final    Total Bilirubin 1 06 (*) 0 20 - 1 00 mg/dL Final    Comment: Use of this assay is not recommended for patients undergoing treatment with eltrombopag due to the potential for falsely elevated results      eGFR 50  ml/min/1 73sq m Final    Narrative:     National Kidney Disease Foundation guidelines for Chronic Kidney Disease (CKD):     Stage 1 with normal or high GFR (GFR > 90 mL/min/1 73 square meters)    Stage 2 Mild CKD (GFR = 60-89 mL/min/1 73 square meters)    Stage 3A Moderate CKD (GFR = 45-59 mL/min/1 73 square meters)    Stage 3B Moderate CKD (GFR = 30-44 mL/min/1 73 square meters)    Stage 4 Severe CKD (GFR = 15-29 mL/min/1 73 square meters)    Stage 5 End Stage CKD (GFR <15 mL/min/1 73 square meters)  Note: GFR calculation is accurate only with a steady state creatinine   SEDIMENTATION RATE - Abnormal    Sed Rate 40 (*) 0 - 29 mm/hour Final   C-REACTIVE PROTEIN - Abnormal    CRP 78 1 (*) <3 0 mg/L Final   CBC AND DIFFERENTIAL - Abnormal    WBC 4 87  4 31 - 10 16 Thousand/uL Final    RBC 4 56  3 81 - 5 12 Million/uL Final    Hemoglobin 13 8  11 5 - 15 4 g/dL Final    Hematocrit 42 0  34 8 - 46 1 % Final    MCV 92  82 - 98 fL Final    MCH 30 3  26 8 - 34 3 pg Final    MCHC 32 9  31 4 - 37 4 g/dL Final    RDW 13 2  11 6 - 15 1 % Final    MPV 9 2  8 9 - 12 7 fL Final    Platelets 073 (*) 629 - 390 Thousands/uL Final    nRBC 0  /100 WBCs Final    Neutrophils Relative 76 (*) 43 - 75 % Final    Immat GRANS % 0  0 - 2 % Final    Lymphocytes Relative 15  14 - 44 % Final    Monocytes Relative 9  4 - 12 % Final    Eosinophils Relative 0  0 - 6 % Final    Basophils Relative 0  0 - 1 % Final    Neutrophils Absolute 3 67  1 85 - 7 62 Thousands/µL Final    Immature Grans Absolute 0 02  0 00 - 0 20 Thousand/uL Final    Lymphocytes Absolute 0 75  0 60 - 4 47 Thousands/µL Final    Monocytes Absolute 0 42  0 17 - 1 22 Thousand/µL Final    Eosinophils Absolute 0 00  0 00 - 0 61 Thousand/µL Final    Basophils Absolute 0 01  0 00 - 0 10 Thousands/µL Final   COMPREHENSIVE METABOLIC PANEL - Abnormal    Sodium 132 (*) 136 - 145 mmol/L Final    Potassium 2 9 (*) 3 5 - 5 3 mmol/L Final    Chloride 101  100 - 108 mmol/L Final    CO2 26  21 - 32 mmol/L Final    ANION GAP 5  4 - 13 mmol/L Final    BUN 20  5 - 25 mg/dL Final    Creatinine 1 12  0 60 - 1 30 mg/dL Final    Comment: Standardized to IDMS reference method    Glucose 96  65 - 140 mg/dL Final    Comment: If the patient is fasting, the ADA then defines impaired fasting glucose as > 100 mg/dL and diabetes as > or equal to 123 mg/dL  Specimen collection should occur prior to Sulfasalazine administration due to the potential for falsely depressed results  Specimen collection should occur prior to Sulfapyridine administration due to the potential for falsely elevated results  Calcium 8 6  8 3 - 10 1 mg/dL Final    Corrected Calcium 9 1  8 3 - 10 1 mg/dL Final    AST 48 (*) 5 - 45 U/L Final    Comment: Specimen collection should occur prior to Sulfasalazine administration due to the potential for falsely depressed results  ALT 73  12 - 78 U/L Final    Comment: Specimen collection should occur prior to Sulfasalazine and/or Sulfapyridine administration due to the potential for falsely depressed results  Alkaline Phosphatase 141 (*) 46 - 116 U/L Final    Total Protein 7 7  6 4 - 8 2 g/dL Final    Albumin 3 4 (*) 3 5 - 5 0 g/dL Final    Total Bilirubin 0 36  0 20 - 1 00 mg/dL Final    Comment: Use of this assay is not recommended for patients undergoing treatment with eltrombopag due to the potential for falsely elevated results      eGFR 53  ml/min/1 73sq m Final    Narrative:     [place] guidelines for Chronic Kidney Disease (CKD):     Stage 1 with normal or high GFR (GFR > 90 mL/min/1 73 square meters)    Stage 2 Mild CKD (GFR = 60-89 mL/min/1 73 square meters)    Stage 3A Moderate CKD (GFR = 45-59 mL/min/1 73 square meters)    Stage 3B Moderate CKD (GFR = 30-44 mL/min/1 73 square meters)    Stage 4 Severe CKD (GFR = 15-29 mL/min/1 73 square meters)    Stage 5 End Stage CKD (GFR <15 mL/min/1 73 square meters)  Note: GFR calculation is accurate only with a steady state creatinine   LACTIC ACID, PLASMA - Normal    LACTIC ACID 1 6  0 5 - 2 0 mmol/L Final    Narrative:     Result may be elevated if tourniquet was used during collection  PROCALCITONIN TEST - Normal    Procalcitonin 0 11  <=0 25 ng/ml Final    Comment: Suspected Lower Respiratory Tract Infection (LRTI):  - LESS than or EQUAL to 0 25 ng/mL:   low likelihood for bacterial LRTI; antibiotics DISCOURAGED   - GREATER than 0 25 ng/mL:   increased likelihood for bacterial LRTI; antibiotics ENCOURAGED  Suspected Sepsis:  - Strongly consider initiating antibiotics in ALL UNSTABLE patients  - LESS than or EQUAL to 0 5 ng/mL:   low likelihood for bacterial sepsis; antibiotics DISCOURAGED   - GREATER than 0 5 ng/mL:   increased likelihood for bacterial sepsis; antibiotics ENCOURAGED   - GREATER than 2 ng/mL:   high risk for severe sepsis / septic shock; antibiotics strongly ENCOURAGED  Decisions on antibiotic use should not be based solely on Procalcitonin (PCT) levels  If PCT is low but uncertainty exists with stopping antibiotics, repeat PCT in 6-24 hours to confirm the low level  If antibiotics are administered (regardless if initial PCT was high or low), repeat PCT every 1-2 days to consider early antibiotic cessation (when GREATER than 80% decrease from the peak OR when PCT drops below designated cutoffs, whichever comes first), so long as the infection is NOT one that typically requires prolonged treatment durations (e g , bone/joint infections, endocarditis, Staph  aureus bacteremia)      Situations of FALSE-POSITIVE Procalcitonin values:  1) Newborns < 72 hours old  2) Massive stress from severe trauma / burns, major surgery, acute pancreatitis, cardiogenic / hemorrhagic shock, sickle cell crisis, or other organ perfusion abnormalities  3) Malaria and some Candidal infections  4) Treatment with agents that stimulate cytokines (e g , OKT3, anti-lymphocyte globulins, alemtuzumab, IL-2, granulocyte transfusion [NOT GCSFs])  5) Chronic renal disease causes elevated baseline levels (consider GREATER than 0 75 ng/mL as an abnormal cut-off); initiating HD/CRRT may cause transient decreases  6) Paraneoplastic syndromes from medullary thyroid or SCLC, some forms of vasculitis, and acute yvsvp-dm-foiz disease    Situations of FALSE-NEGATIVE Procalcitonin values:  1) Too early in clinical course for PCT to have reached its peak (may repeat in 6-24 hours to confirm low level)  2) Localized infection WITHOUT systemic (SIRS / sepsis) response (e g , an abscess, osteomyelitis, cystitis)  3) Mycobacteria (e g , Tuberculosis, MAC)  4) Cystic fibrosis exacerbations     HS TROPONIN I 0HR - Normal    hs TnI 0hr 21  "Refer to ACS Flowchart"- see link ng/L Final    Comment:                                              Initial (time 0) result  If >=50 ng/L, Myocardial injury suggested ;  Type of myocardial injury and treatment strategy  to be determined  If 5-49 ng/L, a delta result at 2 hours and or 4 hours will be needed to further evaluate  If <4 ng/L, and chest pain has been >3 hours since onset, patient may qualify for discharge based on the HEART score in the ED  If <5 ng/L and <3hours since onset of chest pain, a delta result at 2 hours will be needed to further evaluate  Second Troponin (time 2 hours)  If calculated delta >= 20 ng/L,  Myocardial injury suggested ; Type of myocardial injury and treatment strategy to be determined  If 5-49 ng/L and the calculated delta is 5-19 ng/L, consult medical service for evaluation    Continue evaluation for ischemia on ecg and other possible etiology and repeat hs troponin at 4 hours  If delta is <5 ng/L at 2 hours, consider discharge based on risk stratification via the HEART score (if in ED), or TOM risk score in IP/Observation  BLOOD CULTURE   BLOOD CULTURE   PROCALCITONIN TEST   CHRONIC HEPATITIS PANEL   RAPID HIV 1/2 AB-AG COMBO   D-DIMER, QUANTITATIVE   HS TROPONIN I 2HR   HS TROPONIN I 4HR   PROCALCITONIN REFLEX     Time reflects when diagnosis was documented in both MDM as applicable and the Disposition within this note     Time User Action Codes Description Comment    1/11/2022  5:21 PM Almeta Peeling [L03 90] Cellulitis     1/11/2022  5:22 PM Almeta Peeling [B00 899] Leg ulcer ([address] )     1/11/2022  5:22 PM Venda Splinter Modify [L03 90] Cellulitis     1/11/2022  5:22 PM Sonya [place] [N03 473] Leg ulcer ([address] )     1/11/2022  6:45 PM Venda Splinter Modify [L03 90] Cellulitis; failure of outpatient management       ED Disposition     ED Disposition Condition Date/Time Comment    Admit Stable Tue Jan 11, 2022  6:45 PM Case was discussed with MARGARET and the patient's admission status was agreed to be Admission Status: inpatient status to the service of Dr SCHULTZ [facility]   Follow-up Information    None       Patient's Medications   Discharge Prescriptions    No medications on file     No discharge procedures on file  Prior to Admission Medications   Prescriptions Last Dose Informant Patient Reported? Taking? Acetaminophen 325 MG CAPS  Self No No   Sig: Take 3 capsules (975 mg total) by mouth every 8 (eight) hours as needed (mild pain)   Patient not taking: Reported on 1/11/2022    oxyCODONE (ROXICODONE) 5 immediate release tablet  Self Yes No   Sig: take 1 tablet by mouth every 6 hours if needed for pain MAXIMUM DAILY DOSE OF 4 tablets      Facility-Administered Medications: None       Portions of the record may have been created with voice recognition software   Occasional wrong word or "sound a like" substitutions may have occurred due to the inherent limitations of voice recognition software  Read the chart carefully and recognize, using context, where substitutions have occurred      Electronically signed by:  Norma Wu MD  01/11/22 2043

## 2022-01-12 LAB
4HR DELTA HS TROPONIN: -1 NG/L
ALBUMIN SERPL BCP-MCNC: 3.3 G/DL (ref 3.5–5)
ALP SERPL-CCNC: 137 U/L (ref 46–116)
ALT SERPL W P-5'-P-CCNC: 67 U/L (ref 12–78)
ANION GAP SERPL CALCULATED.3IONS-SCNC: 7 MMOL/L (ref 4–13)
AST SERPL W P-5'-P-CCNC: 49 U/L (ref 5–45)
BASOPHILS # BLD AUTO: 0 THOUSANDS/ΜL (ref 0–0.1)
BASOPHILS NFR BLD AUTO: 0 % (ref 0–1)
BILIRUB SERPL-MCNC: 0.41 MG/DL (ref 0.2–1)
BUN SERPL-MCNC: 20 MG/DL (ref 5–25)
CALCIUM ALBUM COR SERPL-MCNC: 8.9 MG/DL (ref 8.3–10.1)
CALCIUM SERPL-MCNC: 8.3 MG/DL (ref 8.3–10.1)
CARDIAC TROPONIN I PNL SERPL HS: 20 NG/L
CHLORIDE SERPL-SCNC: 104 MMOL/L (ref 100–108)
CK SERPL-CCNC: 88 U/L (ref 26–192)
CO2 SERPL-SCNC: 24 MMOL/L (ref 21–32)
CREAT SERPL-MCNC: 1.02 MG/DL (ref 0.6–1.3)
CRP SERPL QL: 66.6 MG/L
EOSINOPHIL # BLD AUTO: 0 THOUSAND/ΜL (ref 0–0.61)
EOSINOPHIL NFR BLD AUTO: 0 % (ref 0–6)
ERYTHROCYTE [DISTWIDTH] IN BLOOD BY AUTOMATED COUNT: 13.3 % (ref 11.6–15.1)
GFR SERPL CREATININE-BSD FRML MDRD: 59 ML/MIN/1.73SQ M
GLUCOSE SERPL-MCNC: 105 MG/DL (ref 65–140)
HBV CORE AB SER QL: NORMAL
HBV CORE IGM SER QL: NORMAL
HBV SURFACE AG SER QL: NORMAL
HCT VFR BLD AUTO: 42.6 % (ref 34.8–46.1)
HCV AB SER QL: NORMAL
HGB BLD-MCNC: 14.5 G/DL (ref 11.5–15.4)
IMM GRANULOCYTES # BLD AUTO: 0.01 THOUSAND/UL (ref 0–0.2)
IMM GRANULOCYTES NFR BLD AUTO: 0 % (ref 0–2)
LYMPHOCYTES # BLD AUTO: 0.82 THOUSANDS/ΜL (ref 0.6–4.47)
LYMPHOCYTES NFR BLD AUTO: 21 % (ref 14–44)
MAGNESIUM SERPL-MCNC: 2 MG/DL (ref 1.6–2.6)
MCH RBC QN AUTO: 30.5 PG (ref 26.8–34.3)
MCHC RBC AUTO-ENTMCNC: 34 G/DL (ref 31.4–37.4)
MCV RBC AUTO: 90 FL (ref 82–98)
MONOCYTES # BLD AUTO: 0.47 THOUSAND/ΜL (ref 0.17–1.22)
MONOCYTES NFR BLD AUTO: 12 % (ref 4–12)
NEUTROPHILS # BLD AUTO: 2.68 THOUSANDS/ΜL (ref 1.85–7.62)
NEUTS SEG NFR BLD AUTO: 67 % (ref 43–75)
NRBC BLD AUTO-RTO: 0 /100 WBCS
PHOSPHATE SERPL-MCNC: 2.5 MG/DL (ref 2.3–4.1)
PLATELET # BLD AUTO: 151 THOUSANDS/UL (ref 149–390)
PMV BLD AUTO: 9.3 FL (ref 8.9–12.7)
POTASSIUM SERPL-SCNC: 3.4 MMOL/L (ref 3.5–5.3)
PROCALCITONIN SERPL-MCNC: 0.1 NG/ML
PROT SERPL-MCNC: 7.4 G/DL (ref 6.4–8.2)
RBC # BLD AUTO: 4.75 MILLION/UL (ref 3.81–5.12)
SODIUM SERPL-SCNC: 135 MMOL/L (ref 136–145)
WBC # BLD AUTO: 3.98 THOUSAND/UL (ref 4.31–10.16)

## 2022-01-12 PROCEDURE — 83735 ASSAY OF MAGNESIUM: CPT | Performed by: INTERNAL MEDICINE

## 2022-01-12 PROCEDURE — 85025 COMPLETE CBC W/AUTO DIFF WBC: CPT | Performed by: INTERNAL MEDICINE

## 2022-01-12 PROCEDURE — 36415 COLL VENOUS BLD VENIPUNCTURE: CPT | Performed by: INTERNAL MEDICINE

## 2022-01-12 PROCEDURE — NC001 PR NO CHARGE: Performed by: PHYSICIAN ASSISTANT

## 2022-01-12 PROCEDURE — 84145 PROCALCITONIN (PCT): CPT | Performed by: INTERNAL MEDICINE

## 2022-01-12 PROCEDURE — 97162 PT EVAL MOD COMPLEX 30 MIN: CPT

## 2022-01-12 PROCEDURE — 86140 C-REACTIVE PROTEIN: CPT | Performed by: INTERNAL MEDICINE

## 2022-01-12 PROCEDURE — 80053 COMPREHEN METABOLIC PANEL: CPT | Performed by: INTERNAL MEDICINE

## 2022-01-12 PROCEDURE — 82550 ASSAY OF CK (CPK): CPT | Performed by: INTERNAL MEDICINE

## 2022-01-12 PROCEDURE — 84100 ASSAY OF PHOSPHORUS: CPT | Performed by: INTERNAL MEDICINE

## 2022-01-12 PROCEDURE — 99254 IP/OBS CNSLTJ NEW/EST MOD 60: CPT | Performed by: INTERNAL MEDICINE

## 2022-01-12 PROCEDURE — 84484 ASSAY OF TROPONIN QUANT: CPT | Performed by: INTERNAL MEDICINE

## 2022-01-12 PROCEDURE — 99232 SBSQ HOSP IP/OBS MODERATE 35: CPT | Performed by: PHYSICIAN ASSISTANT

## 2022-01-12 PROCEDURE — 97166 OT EVAL MOD COMPLEX 45 MIN: CPT

## 2022-01-12 RX ORDER — CEFAZOLIN SODIUM 1 G/50ML
1000 SOLUTION INTRAVENOUS EVERY 8 HOURS
Status: DISCONTINUED | OUTPATIENT
Start: 2022-01-12 | End: 2022-01-14 | Stop reason: HOSPADM

## 2022-01-12 RX ADMIN — CEFAZOLIN SODIUM 2000 MG: 2 SOLUTION INTRAVENOUS at 08:47

## 2022-01-12 RX ADMIN — ONDANSETRON 4 MG: 2 INJECTION INTRAMUSCULAR; INTRAVENOUS at 17:12

## 2022-01-12 RX ADMIN — OXYCODONE HYDROCHLORIDE 5 MG: 5 TABLET ORAL at 00:47

## 2022-01-12 RX ADMIN — DOCUSATE SODIUM 100 MG: 100 CAPSULE ORAL at 08:47

## 2022-01-12 RX ADMIN — OXYCODONE HYDROCHLORIDE 5 MG: 5 TABLET ORAL at 05:52

## 2022-01-12 RX ADMIN — ACETAMINOPHEN 650 MG: 325 TABLET, FILM COATED ORAL at 17:06

## 2022-01-12 RX ADMIN — CIPROFLOXACIN 400 MG: 2 INJECTION, SOLUTION INTRAVENOUS at 01:15

## 2022-01-12 RX ADMIN — ENOXAPARIN SODIUM 40 MG: 40 INJECTION SUBCUTANEOUS at 08:47

## 2022-01-12 RX ADMIN — STANDARDIZED SENNA CONCENTRATE 8.6 MG: 8.6 TABLET ORAL at 08:47

## 2022-01-12 RX ADMIN — CEFAZOLIN SODIUM 2000 MG: 2 SOLUTION INTRAVENOUS at 00:40

## 2022-01-12 RX ADMIN — SODIUM CHLORIDE 50 ML/HR: 0.9 INJECTION, SOLUTION INTRAVENOUS at 22:30

## 2022-01-12 RX ADMIN — CIPROFLOXACIN 400 MG: 2 INJECTION, SOLUTION INTRAVENOUS at 09:01

## 2022-01-12 RX ADMIN — REMDESIVIR 100 MG: 100 INJECTION, POWDER, LYOPHILIZED, FOR SOLUTION INTRAVENOUS at 20:49

## 2022-01-12 RX ADMIN — CEFAZOLIN SODIUM 1000 MG: 1 SOLUTION INTRAVENOUS at 16:55

## 2022-01-12 NOTE — ASSESSMENT & PLAN NOTE
· Venous leg ulcer of right lower extremity  · She had significant pain and right lower ext and right lower leg venous ulcer     · Follows with Dr Leona Davis outpatient   · Vascular surgery consult

## 2022-01-12 NOTE — ASSESSMENT & PLAN NOTE
Cellulitis of right anterior lower leg  Wound grew MSSA and group C strep  MRI of lower ext did not show an abscess  Patient failed bactrim, patient was placed on zosyn and vancomycin   D/c home with doxycycline 100 mg bid and augmentin 875 mg bid did not improve  Started in ED with Cefazolin and cipro  Start patient on   CRP-78 1  Lactic acid normal

## 2022-01-12 NOTE — CASE MANAGEMENT
Case Management Discharge Planning Note    Patient name Destiney Tyler  Location [address] [place] [address] [id number] [id number]  : 1960 Date 2022       Current Admission Date: 2022  Current Admission Diagnosis:Cellulitis of right anterior lower leg   Patient Active Problem List    Diagnosis Date Noted    COVID-19 2022    Elevated liver enzymes 2022    Hypokalemia 2022    Sepsis ([facility] ) 2022    Chronic venous hypertension (idiopathic) with ulcer of right lower extremity ([facility] ) 2022    Venous ulcer of right lower extremity with varicose veins ([facility] ) 2021    Primary hypertension 2021    Cellulitis of right anterior lower leg 12/15/2021      LOS (days): 1  Geometric Mean LOS (GMLOS) (days):   Days to GMLOS:     OBJECTIVE:  Risk of Unplanned Readmission Score: 10         Current admission status: Inpatient   Preferred Pharmacy:   [address] - [id number] [place] [address],[address] [facility] [address]  Phone: [phone] Fax: [phone]    Primary Care Provider: Yulissa Calvert    Primary Insurance: Devra Goldmann  Secondary Insurance:     [facility] Number: SNF auth submitted via Addie Ivy, pending ref # [id number] for [employer] for [facility] Hai Diaz [id number] Dr Angela Mesa - NPI [id number] clinicals attached in [facility]

## 2022-01-12 NOTE — ASSESSMENT & PLAN NOTE
· Cellulitis of right anterior lower leg, POA  · Wound grew MSSA and group C strep  · MRI of lower ext 12/26 did not show an abscess   · Patient failed bactrim, patient was placed on zosyn and vancomycin last admission at [facility] in December and discharged home on doxycycline 100 mg bid and augmentin 875 mg bid did not improve  · Currently on IV Ancef and Cipro, continue for now  · ID and vascular consults pending

## 2022-01-12 NOTE — ASSESSMENT & PLAN NOTE
Covid positive, mild pathway due to obesity   Temp is 101 5: CRP 78  Follow D-dimer, blood cultures, pro calcitonin   She does not require oxygen   remdisivir x 5 days   Monitor for oxygen

## 2022-01-12 NOTE — PHYSICAL THERAPY NOTE
Physical Therapy Evaluation     Patient's Name: Tonya Huerta    Admitting Diagnosis  Cellulitis [L03 90]  Leg ulcer ([facility] [address] ) [L97 909]    Problem List  Patient Active Problem List   Diagnosis    Cellulitis of right anterior lower leg    Primary hypertension    Venous ulcer of right lower extremity with varicose veins (HCC)    Chronic venous hypertension (idiopathic) with ulcer of right lower extremity ([facility] [address] )    COVID-19    Elevated liver enzymes    Hypokalemia    Sepsis ([facility] [address] )       Past Medical History  Past Medical History:   Diagnosis Date    Hypertension     Ulcer of right shin ([facility] [address] )        Past Surgical History  History reviewed  No pertinent surgical history  01/12/22 0843   PT Last Visit   PT Visit Date 01/12/22   Note Type   Note type Evaluation   Pain Assessment   Pain Assessment Tool 0-10   Pain Score 4   Pain Location/Orientation Orientation: Right;Location: Leg   Hospital Pain Intervention(s) Repositioned; Ambulation/increased activity; Emotional support   Restrictions/Precautions   Weight Bearing Precautions Per Order No   Other Precautions Multiple lines;Pain;Contact/isolation; Airborne/isolation  (COVDI (+))   Home Living   Type of [address] [place] [address] Two level;Bed/bath upstairs;Stairs to enter with rails   Bathroom Shower/Tub Walk-in shower   [address]   (denies DME)   Additional Comments Pt resides in [facility] w/ 1 SKYLER and FF to bed/bath on 2nd floor   Pt reports ambulating w/out AD PTA   Prior Function   Level of [place] Independent with ADLs and functional mobility   Lives With Spouse;Daughter   Receives Help From Family   ADL Assistance Independent   IADLs Independent   Falls in the last 6 months 0   General   Family/Caregiver Present No   Cognition   Overall Cognitive Status WFL   Arousal/Participation Alert   Attention Within functional limits   Orientation Level Oriented X4   Memory Within functional limits   Following Commands Follows all commands and directions without difficulty   Comments Pt pleasant and cooperative   Subjective   Subjective Pt lying supine and agreeable to PT evaluation   RLE Assessment   RLE Assessment WFL   LLE Assessment   LLE Assessment WFL   Bed Mobility   Supine to Sit 6  Modified independent   Additional items Increased time required   Sit to Supine Unable to assess   Additional Comments Pt lying supine upon PT arrival  Pt returned seated OOB at end of session w/ all needs within reach   Transfers   Sit to Stand 6  Modified independent   Stand to Sit 6  Modified independent   Ambulation/Elevation   Gait pattern Inconsistent mae   Gait Assistance 5  Supervision   Additional items Verbal cues   Assistive Device None   Distance ~80ft in room   Balance   Static Sitting Normal   Dynamic Sitting Good   Static Standing Good   Dynamic Standing Fair +   Ambulatory Fair +   Endurance Deficit   Endurance Deficit No   Activity Tolerance   Activity Tolerance Patient tolerated treatment well   Medical Staff Made Aware OT Inna Bar; Co-evaluation performed w/ OT due to pt's multiple co-morbidities, clinically unstable presentation, and regression in functional impairments as compared to baseline  PT focused on transfers, mobility, and LE assessment   Nurse Made Aware yes   Assessment   Assessment Pt is 64 y o  female seen for PT evaluation s/p admit to [facility] on 1/11/2022 w/ Cellulitis of right anterior lower leg  PT consulted to assess pt's functional mobility and d/c needs  Order placed for PT eval and tx, w/ up w/ A order  Comorbidities affecting pt's physical performance at time of assessment include: RLE cellulitis, HTN, COVID-19, sepsis  PTA, pt was independent w/ all functional mobility w/ no AD and lives w/ spouse and dtr in two level house w/ 1 STS and FF to bed/batgh  Personal factors affecting pt at time of IE include: inaccessible home environment, lives in two story house and stairs to enter home   Please find objective findings from PT assessment regarding body systems outlined above with impairments and limitations including decreased endurance, pain and decreased functional mobility tolerance  Pt performed bed mobility and transfers at Mod I  Pt ambulated ~80ft in room at supervision  The following objective measures performed on IE also reveal limitations: AM-PAC 6-Clicks: 99/74  Pt's clinical presentation is currently evolving seen in pt's presentation of recent admission for RLE cellulitis requiring medical attention, recent decline in function as compared to baseline, multiple lines, pain  Pt appears to be functioning at baseline level with functional mobility; therefore, requires no immediate acute skilled PT services at this time  Pt with no further questions or concerns regarding PT services  Will D/C PT at this time  Please re-consult if pt's medical status changes  From PT/mobility standpoint, recommendation at time of d/c would be no rehabilitation needs pending progress in order to facilitate return to PLOF  Goals   Patient Goals to return home   Plan   PT Frequency   (D/C PT)   Recommendation   PT Discharge Recommendation No rehabilitation needs   AM-PAC Basic Mobility Inpatient   Turning in Bed Without Bedrails 4   Lying on Back to Sitting on Edge of Flat Bed 4   Moving Bed to Chair 4   Standing Up From Chair 4   Walk in Room 3   Climb 3-5 Stairs 3   Basic Mobility Inpatient Raw Score 22   Basic Mobility Standardized Score 47 4   Highest Level Of Mobility   JH-HLM Goal 7: Walk 25 feet or more   JH-HLM Highest Level of Mobility 7: Walk 25 feet or more   JH-HLM Goal Achieved Yes   Modified Forbes Scale   Modified Forbes Scale 2     The patient's AM-PAC Basic Mobility Inpatient Short Form Raw Score is 22  A Raw score of greater than 16 suggests the patient may benefit from discharge to home  Please also refer to the recommendation of the Physical Therapist for safe discharge planning      Fransisco Razo, PT, DPT

## 2022-01-12 NOTE — ASSESSMENT & PLAN NOTE
Blood pressure elevated  Would start patient on hydralazine 5 mg iv when sbp>140  Might need to start with lisinopril

## 2022-01-12 NOTE — ASSESSMENT & PLAN NOTE
· Blood pressure elevated  · Would start patient on hydralazine 5 mg iv when sbp>140  · Might need to start with lisinopril

## 2022-01-12 NOTE — ASSESSMENT & PLAN NOTE
· Incidentally tested positive on admission  · Mild pathway, patient is on room air  · Started on Remdesivir given risk factor of obesity   · Isolation per protocol

## 2022-01-12 NOTE — H&P
[address]  H&P- Rehan Alves 1960, 64 y o  female MRN: [id number]  Unit/Bed#: ED 01 Encounter: [id number]  Primary Care Provider: Denny Doran   Date and time admitted to hospital: 1/11/2022  4:31 PM    * Cellulitis of right anterior lower leg  Assessment & Plan  Cellulitis of right anterior lower leg  Wound grew MSSA and group C strep  MRI of lower ext did not show an abscess  Patient failed bactrim, patient was placed on zosyn and vancomycin   D/c home with doxycycline 100 mg bid and augmentin 875 mg bid did not improve  Started in ED with Cefazolin and cipro  Start patient on   CRP-78 1  Lactic acid normal    Sepsis ([facility] [address] )  Assessment & Plan  Patient presented with elevated temp, high resp rate, in the setting of covid and cellulitis  Iv fluids   Follow blood cultures  procalcitonin follow  Antibiotics cipro and cefazolin       Hypokalemia  Assessment & Plan  Replaced and following in the am    Elevated liver enzymes  Assessment & Plan  Unsure of why elevation  Would get liver ultrasound in the morning     COVID-19  Assessment & Plan  Covid positive, mild pathway due to obesity   Temp is 101 5: CRP 78  Follow D-dimer, blood cultures, pro calcitonin   She does not require oxygen   remdisivir x 5 days   Monitor for oxygen       Venous ulcer of right lower extremity with varicose veins (HCC)  Assessment & Plan  Venous leg ulcer of right lower extremity  She had significant pain and right lower ext and right lower leg venous ulcer  Follows with Dr Len Villa outpatient   Vascular surgery consult       Primary hypertension  Assessment & Plan  Blood pressure elevated  Would start patient on hydralazine 5 mg iv when sbp>140  Might need to start with lisinopril     VTE Pharmacologic Prophylaxis: VTE Score: 6 High Risk (Score >/= 5) - Pharmacological DVT Prophylaxis Ordered: enoxaparin (Lovenox)  Sequential Compression Devices Ordered    Code Status: Level 1 - Full Code Discussion with family: Patient declined call to   Anticipated Length of Stay: Patient will be admitted on an inpatient basis with an anticipated length of stay of greater than 2 midnights secondary to for the duration of remdisivir and antibiotics  Total Time for Visit, including Counseling / Coordination of Care: 30 minutes Greater than 50% of this total time spent on direct patient counseling and coordination of care  Chief Complaint: fever    History of Present Illness:  Rufus Sandhoff is a 64 y o  female with a PMH of venous stasis ulcer with cellulitis on the shin of the right lower ext who presents with fever of 102 1  She says was discharged on antibiotics doxycycline and augmentin, however, without improvement of cellulitis  She was seen in vascular surgery office and they sent her to ED  Patient has draining ulcer with cellulitis of the right shin area  Incidentally, she was positive for covid  Due to her weight she is placed on mild pathway for remidisivir  She will need ID and vascular surgery consults  Review of Systems:  Review of Systems   Constitutional: Negative for chills and fever  HENT: Negative for ear pain and sore throat  Eyes: Negative for pain and visual disturbance  Respiratory: Negative for cough and shortness of breath  Cardiovascular: Negative for chest pain and palpitations  Gastrointestinal: Negative for abdominal pain and vomiting  Genitourinary: Negative for dysuria and hematuria  Musculoskeletal: Negative for arthralgias and back pain  Skin: Positive for wound  Negative for color change and rash  Neurological: Negative for dizziness, seizures and syncope  All other systems reviewed and are negative  Past Medical and Surgical History:   History reviewed  No pertinent past medical history  History reviewed  No pertinent surgical history      Meds/Allergies:  Prior to Admission medications    Medication Sig Start Date End Date Taking? Authorizing Provider   Acetaminophen 325 MG CAPS Take 3 capsules (975 mg total) by mouth every 8 (eight) hours as needed (mild pain)  Patient not taking: Reported on 1/11/2022 12/27/21   Elvia Rosen MD   oxyCODONE (ROXICODONE) 5 immediate release tablet take 1 tablet by mouth every 6 hours if needed for pain MAXIMUM DAILY DOSE OF 4 tablets 12/31/21   Historical Provider, MD     I have reviewed home medications with patient personally  Allergies: Allergies   Allergen Reactions    Wound Dressing Adhesive Rash       Social History:  Marital Status: /Civil Union   Occupation:    Patient Pre-hospital Living Situation: Home  Patient Pre-hospital Level of Mobility: walks  Patient Pre-hospital Diet Restrictions: regular   Substance Use History:   Social History     Substance and Sexual Activity   Alcohol Use Not Currently     Social History     Tobacco Use   Smoking Status Never Smoker   Smokeless Tobacco Never Used     Social History     Substance and Sexual Activity   Drug Use Never       Family History:  Family History   Problem Relation Age of Onset    No Known Problems Mother     No Known Problems Father     No Known Problems Sister     No Known Problems Brother     No Known Problems Son     No Known Problems Daughter     No Known Problems Maternal Grandmother     No Known Problems Maternal Grandfather     No Known Problems Paternal Grandmother     No Known Problems Paternal Grandfather     No Known Problems Maternal Aunt     No Known Problems Maternal Uncle     No Known Problems Paternal Aunt     No Known Problems Paternal Uncle     No Known Problems Cousin        Physical Exam:     Vitals:   Blood Pressure: 142/70 (01/11/22 2100)  Pulse: 78 (01/11/22 2100)  Temperature: 99 6 °F (37 6 °C) (01/11/22 1834)  Temp Source: Oral (01/11/22 1834)  Respirations: 14 (01/11/22 2100)  SpO2: 96 % (01/11/22 2100)    Physical Exam  Vitals and nursing note reviewed     Constitutional: General: She is not in acute distress  Appearance: She is well-developed  HENT:      Head: Normocephalic and atraumatic  Mouth/Throat:      Mouth: Mucous membranes are dry  Eyes:      Conjunctiva/sclera: Conjunctivae normal    Cardiovascular:      Rate and Rhythm: Normal rate and regular rhythm  Heart sounds: No murmur heard  Pulmonary:      Effort: Pulmonary effort is normal  No respiratory distress  Breath sounds: Normal breath sounds  Abdominal:      Palpations: Abdomen is soft  There is no mass  Tenderness: There is no abdominal tenderness  There is no right CVA tenderness, left CVA tenderness, guarding or rebound  Hernia: No hernia is present  Musculoskeletal:      Cervical back: Neck supple  Skin:     General: Skin is warm and dry  Neurological:      General: No focal deficit present  Mental Status: She is alert and oriented to person, place, and time  Psychiatric:         Mood and Affect: Mood normal          Behavior: Behavior normal          Thought Content:  Thought content normal          Judgment: Judgment normal            Additional Data:     Lab Results:  Results from last 7 days   Lab Units 01/11/22 1956   WBC Thousand/uL 4 87   HEMOGLOBIN g/dL 13 8   HEMATOCRIT % 42 0   PLATELETS Thousands/uL 148*   NEUTROS PCT % 76*   LYMPHS PCT % 15   MONOS PCT % 9   EOS PCT % 0     Results from last 7 days   Lab Units 01/11/22 1956   SODIUM mmol/L 132*   POTASSIUM mmol/L 2 9*   CHLORIDE mmol/L 101   CO2 mmol/L 26   BUN mg/dL 20   CREATININE mg/dL 1 12   ANION GAP mmol/L 5   CALCIUM mg/dL 8 6   ALBUMIN g/dL 3 4*   TOTAL BILIRUBIN mg/dL 0 36   ALK PHOS U/L 141*   ALT U/L 73   AST U/L 48*   GLUCOSE RANDOM mg/dL 96                 Results from last 7 days   Lab Units 01/11/22 1956 01/11/22  1704   LACTIC ACID mmol/L  --  1 6   PROCALCITONIN ng/ml 0 11  0 11  --        Imaging: Reviewed radiology reports from this admission including: chest xray, xray of tib/fib  XR tibia fibula 2 views RIGHT   ED Interpretation by Stefanie Styles MD (01/11 1807)   Abnormal   Hardware in knee  No obvious air tracking up from the ulcer on her anterior distal tibia  US abdomen complete    (Results Pending)       EKG and Other Studies Reviewed on Admission:   · EKG: Sinus Tachycardia    ** Please Note: This note has been constructed using a voice recognition system   **

## 2022-01-12 NOTE — CASE MANAGEMENT
Case Management Assessment & Discharge Planning Note    Patient name Abdelrahman Hedrick  Location [address]/PPHP 816-22 MRN [id number]  : 1960 Date 2022       Current Admission Date: 2022  Current Admission Diagnosis:Cellulitis of right anterior lower leg   Patient Active Problem List    Diagnosis Date Noted    COVID-19 2022    Elevated liver enzymes 2022    Hypokalemia 2022    Sepsis ([facility] [address] ) 2022    Chronic venous hypertension (idiopathic) with ulcer of right lower extremity ([facility] ) 2022    Venous ulcer of right lower extremity with varicose veins ([facility] ) 2021    Primary hypertension 2021    Cellulitis of right anterior lower leg 12/15/2021      LOS (days): 1  Geometric Mean LOS (GMLOS) (days):   Days to GMLOS:     OBJECTIVE:  PATIENT READMITTED TO HOSPITAL  Risk of Unplanned Readmission Score: 10         Current admission status: Inpatient       Preferred Pharmacy:   [facility] [id number] [place] [id number]  Phone: [phone] Fax: [phone]    Primary Care Provider: Juliocesar Salomon    Primary Insurance: Manisha Looney  Secondary Insurance:     ASSESSMENT:  [facility] [employer] Representative - Spouse   Primary Phone: [phone] (Home)                         Readmission Root Cause  30 Day Readmission: Yes  Who directed you to return to the hospital?: Specialist  Did you understand whom to contact if you had questions or problems?: Yes  Did you get your prescriptions before you left the hospital?: Yes  Were you able to get your prescriptions filled when you left the hospital?: Yes  Did you take your medications as prescribed?: Yes  Were you able to get to your follow-up appointments?: Yes  During previous admission, was a post-acute recommendation made?: No  Patient was readmitted due to: COVID, infection RLE  Action Plan: IV ATB    Patient Information  Admitted from[de-identified] Home  Mental Status: Alert  During Assessment patient was accompanied by: Not accompanied during assessment  Assessment information provided by[de-identified] Other - please comment (readmit-info from prior admit)  Primary Caregiver: Self  Support Systems: Spouse/significant other  Home entry access options   Select all that apply : Stairs  Number of steps to enter home : 2  Type of Current Residence: 2 story home  Upon entering residence, is there a bedroom on the main floor (no further steps)?: Yes  Upon entering residence, is there a bathroom on the main floor (no further steps)?: Yes  In the last 12 months, was there a time when you were not able to pay the mortgage or rent on time?: No  In the last 12 months, was there a time when you did not have a steady place to sleep or slept in a shelter (including now)?: No  Living Arrangements: Lives w/ Spouse/significant other    Activities of Daily Living Prior to Admission  Functional Status: Independent  Completes ADLs independently?: Yes  Ambulates independently?: Yes  Does patient use assisted devices?: No  Does patient currently own DME?: No  Does patient have a history of Outpatient Therapy (PT/OT)?: No  Does the patient have a history of Short-Term Rehab?: No  Does patient have a history of HHC?: No         Patient Information Continued  Within the past 12 months, you worried that your food would run out before you got the money to buy more : Never true  Within the past 12 months, the food you bought just didnt last and you didnt have money to get more : Never true  Food insecurity resource given?: N/A  Does patient receive dialysis treatments?: No  Does patient have a history of substance abuse?: No  Does patient have a history of Mental Health Diagnosis?: No         Means of Transportation  Means of Transport to Appts[de-identified] Drives Self  In the past 12 months, has lack of transportation kept you from medical appointments or from getting medications?: No  Was application for public transport provided?: N/A        DISCHARGE DETAILS:       Freedom of Choice: Yes                   Contacts  Patient Contacts:  Marta Joseph  Relationship to Patient[de-identified] Family  Contact Method: Phone  Phone Number: [phone]  Reason/Outcome: Emergency Contact,Continuity of [address]      CM consult-cleared by therapy  Follow for [facility]  needs    CM reviewed d/c planning process including the following: identifying help at home, patient preference for d/c planning needs, Discharge Lounge, [facility] Meds to Bed program, availability of treatment team to discuss questions or concerns patient and/or family may have regarding understanding medications and recognizing signs and symptoms once discharged  CM also encouraged patient to follow up with all recommended appointments after discharge  Patient advised of importance for patient and family to participate in managing patients medical well being  Patient/caregiver received discharge checklist  Content reviewed  Patient/caregiver encouraged to participate in discharge plan of care prior to discharge home

## 2022-01-12 NOTE — PROGRESS NOTES
Vascular Surgery    Pt admitted following office visit with Dr Jose Traore yesterday with concern for sepsis related to cellulitis and possible Covid-19 infection  Pt now Covid+  She will require medical management of Covid and cellulitis  No vascular imaging or intervention planned at this time  She follows in the wound care center for wound management      Outpatient follow-up in the Vascular Center rescheduled with Dr Jose Traore in the [facility]

## 2022-01-12 NOTE — PROGRESS NOTES
[facility]  Progress Note [facility] 1960, 64 y o  female MRN: [id number]  Unit/Bed#: [facility] 315-01 Encounter: [id number]  Primary Care Provider: Jennifer Braswell   Date and time admitted to hospital: 1/11/2022  4:31 PM    * Cellulitis of right anterior lower leg  Assessment & Plan  · Cellulitis of right anterior lower leg, POA  · Wound grew MSSA and group C strep  · MRI of lower ext 12/26 did not show an abscess   · Patient failed bactrim, patient was placed on zosyn and vancomycin last admission at [facility] in December and discharged home on doxycycline 100 mg bid and augmentin 875 mg bid did not improve  · Currently on IV Ancef and Cipro, continue for now  · ID and vascular consults pending     Sepsis ([facility] [address] )  Assessment & Plan  · POA e/b fever, tachypnea  Source RLE cellulitis and COVID-19   · IV fluids per protocol  · BC x 2 pending  · IV abx  · See related plans     Hypokalemia  Assessment & Plan  Replaced, monitor     Elevated liver enzymes  Assessment & Plan  · Possibly related to COVID-19 infection  · RUQ US pending   · Hepatitis panel negative   · Trend     COVID-19  Assessment & Plan  · Incidentally tested positive on admission  · Mild pathway, patient is on room air  · Started on Remdesivir given risk factor of obesity   · Isolation per protocol     Venous ulcer of right lower extremity with varicose veins (HCC)  Assessment & Plan  · Venous leg ulcer of right lower extremity  · She had significant pain and right lower ext and right lower leg venous ulcer  · Follows with Dr Yahaira Andrews outpatient   · Vascular surgery consult     Primary hypertension  Assessment & Plan  · Blood pressure elevated  · Would start patient on hydralazine 5 mg iv when sbp>140  · Might need to start with lisinopril           VTE Pharmacologic Prophylaxis: VTE Score: 6 High Risk (Score >/= 5) - Pharmacological DVT Prophylaxis Ordered: enoxaparin (Lovenox)   Sequential Compression Devices Ordered  Patient Centered Rounds: I performed bedside rounds with nursing staff today  Discussions with Specialists or Other Care Team Provider: RN    Education and Discussions with Family / Patient: Patient declined call to   Time Spent for Care: 20 minutes  More than 50% of total time spent on counseling and coordination of care as described above  Current Length of Stay: 1 day(s)  Current Patient Status: Inpatient   Certification Statement: The patient will continue to require additional inpatient hospital stay due to sepsis   Discharge Plan: Anticipate discharge in 48-72 hrs to home  Code Status: Level 1 - Full Code    Subjective:   Pt reports to pain of RLE otherwise no complaints     Objective:     Vitals:   Temp (24hrs), Av °F (37 8 °C), Min:98 1 °F (36 7 °C), Max:104 °F (40 °C)    Temp:  [98 1 °F (36 7 °C)-104 °F (40 °C)] 98 2 °F (36 8 °C)  HR:  [68-96] 70  Resp:  [14-22] 14  BP: (128-160)/(58-90) 141/70  SpO2:  [94 %-99 %] 94 %  Body mass index is 37 86 kg/m²  Input and Output Summary (last 24 hours): Intake/Output Summary (Last 24 hours) at 2022 1025  Last data filed at 2022 1921  Gross per 24 hour   Intake 250 ml   Output --   Net 250 ml       Physical Exam:   Physical Exam  Vitals reviewed  Constitutional:       General: She is not in acute distress  Appearance: She is not toxic-appearing  HENT:      Head: Normocephalic and atraumatic  Eyes:      Extraocular Movements: Extraocular movements intact  Pulmonary:      Effort: Pulmonary effort is normal  No respiratory distress  Musculoskeletal:         General: Normal range of motion  Cervical back: Normal range of motion  Skin:     Comments: R:E ulcer noted on shin with surrounding erythema and warmth   Neurological:      General: No focal deficit present  Mental Status: She is alert and oriented to person, place, and time     Psychiatric:         Mood and Affect: Mood normal  Behavior: Behavior normal          Thought Content: Thought content normal          Judgment: Judgment normal           Additional Data:     Labs:  Results from last 7 days   Lab Units 01/12/22  0619   WBC Thousand/uL 3 98*   HEMOGLOBIN g/dL 14 5   HEMATOCRIT % 42 6   PLATELETS Thousands/uL 151   NEUTROS PCT % 67   LYMPHS PCT % 21   MONOS PCT % 12   EOS PCT % 0     Results from last 7 days   Lab Units 01/12/22  0619   SODIUM mmol/L 135*   POTASSIUM mmol/L 3 4*   CHLORIDE mmol/L 104   CO2 mmol/L 24   BUN mg/dL 20   CREATININE mg/dL 1 02   ANION GAP mmol/L 7   CALCIUM mg/dL 8 3   ALBUMIN g/dL 3 3*   TOTAL BILIRUBIN mg/dL 0 41   ALK PHOS U/L 137*   ALT U/L 67   AST U/L 49*   GLUCOSE RANDOM mg/dL 105                 Results from last 7 days   Lab Units 01/12/22  0619 01/11/22  1956 01/11/22  1704   LACTIC ACID mmol/L  --   --  1 6   PROCALCITONIN ng/ml 0 10 0 11  0 11  --        Lines/Drains:  Invasive Devices  Report    Peripheral Intravenous Line            Peripheral IV 01/11/22 Left Antecubital <1 day                      Imaging: No pertinent imaging reviewed  Recent Cultures (last 7 days):   Results from last 7 days   Lab Units 01/11/22  1725   BLOOD CULTURE  Received in Microbiology Lab  Culture in Progress  Received in Microbiology Lab  Culture in Progress         Last 24 Hours Medication List:   Current Facility-Administered Medications   Medication Dose Route Frequency Provider Last Rate    acetaminophen  650 mg Oral Q6H PRN Saloni Hearn MD      aluminum-magnesium hydroxide-simethicone  30 mL Oral Q6H PRN Saloni Hearn MD      cefazolin  2,000 mg Intravenous Q8H Saloni Hearn MD 2,000 mg (01/12/22 0847)    ciprofloxacin  400 mg Intravenous Q8H Saloni Hearn  mg (01/12/22 0901)    docusate sodium  100 mg Oral BID Saloni Hearn MD      enoxaparin  40 mg Subcutaneous Daily Slaoni Hearn MD      hydrALAZINE  5 mg Intravenous Q6H PRN MD Kimberly Andino ondansetron  4 mg Intravenous Q6H PRN Elma Rodarte MD      oxyCODONE  5 mg Oral Q4H PRN Elma Rodarte MD      remdesivir  100 mg Intravenous Q24H Elma Rodarte MD      senna  1 tablet Oral Daily Elma Rodarte MD      sodium chloride  50 mL/hr Intravenous Continuous Elma Rodarte MD 50 mL/hr (01/11/22 1958)        Today, Patient Was Seen By: Ondina Myles PA-C    **Please Note: This note may have been constructed using a voice recognition system  **

## 2022-01-12 NOTE — UTILIZATION REVIEW
Initial Clinical Review    Admission: Date/Time/Statement:   Admission Orders (From admission, onward)     Ordered        01/11/22 1845  INPATIENT ADMISSION  Once                      Orders Placed This Encounter   Procedures    INPATIENT ADMISSION     Standing Status:   Standing     Number of Occurrences:   1     Order Specific Question:   Level of Care     Answer:   Med Surg [16]     Order Specific Question:   Estimated length of stay     Answer:   More than 2 Midnights     Order Specific Question:   Certification     Answer:   I certify that inpatient services are medically necessary for this patient for a duration of greater than two midnights  See H&P and MD Progress Notes for additional information about the patient's course of treatment  ED Arrival Information     Expected Arrival Acuity    1/11/2022 1/11/2022 16:28 Urgent         Means of arrival Escorted by Service Admission type    Walk-In Self Hospitalist Urgent         Arrival complaint    Chronic venous hypertension (idiopathic) with ulcer of right lower extremity [facility])        Chief Complaint   Patient presents with    Wound Check     pt sent from dr dominugez's office for evaluation of venous wound on right leg       Initial Presentation: 64 y o  female from [address] Vascular Surgery office to the ED with painful ulceration of the distal right leg, draining ulcer and fever  PMH of venous stasis ulcer with cellulitis on the shin of the right lower ext  Pt was admitted on 12/27 d/t right LE cellulitis and discharged on po abx with no improvement  In ED, T-101 5, CRP-78 1  Started on IV Cefazolin and cipro  Tested COVID positive    Plan: Inpatient admission for evaluation and treatment of cellulitis of anterior lower leg, sepsis, venous ulcer of right LE with varicose veins, HTN, COVID 19, elevated liver enzyme: cont IV abx, Vascular surgery consulted, liver US in am, mild Covid pathway, Follow D-dimer, blood cultures, pro calcitonin, remdesivir, IV hydralazine 5 mg SBP>140    Date: 01/12   Day 2:  Internal medicine Notes: Pt reports RLE pain  Cont IV abx, Infectious disease consulted, cont IVF, blood cultires pending, RUQ US pending, cont remdesivir  Physical exam: Pulmonary effort normal  No respiratory distress  RLE ulcer noted on shin with surrounding erythema and warmth    Vascular Notes: Pt admitted following office visit with Dr Juani Parker yesterday with concern for sepsis related to cellulitis and possible Covid-19 infection  Now Covid+  She will require medical management of Covid and cellulitis  No vascular imaging or intervention planned at this time  OP f/u  Infectious Disease Consult: Right leg cellulitis:  Source is most likely the chronic leg ulcer  She has low-grade fever but fever may be all secondary to COVID  Patient had recent outpatient wound culture growing MSSA and group C strep  These are likely pathogens again  Patient is systemically well, without clinical sepsis or systemic toxicity   Continue IV Cefazolin, no need for Cipro, serial exams, monitor temp/WBC, f/u pending blood cultures     ED Triage Vitals   Temperature Pulse Respirations Blood Pressure SpO2   01/11/22 1636 01/11/22 1636 01/11/22 1636 01/11/22 1636 01/11/22 1636   98 1 °F (36 7 °C) 96 20 149/83 95 %      Temp Source Heart Rate Source Patient Position - Orthostatic VS BP Location FiO2 (%)   01/11/22 1636 01/11/22 1636 01/11/22 1636 01/11/22 1636 --   Oral Monitor Sitting Left arm       Pain Score       01/11/22 1709       8          Wt Readings from Last 1 Encounters:   01/12/22 93 9 kg (207 lb 0 2 oz)     Additional Vital Signs:   Date/Time Temp Pulse Resp BP MAP (mmHg) SpO2 O2 Device Patient Position - Orthostatic VS   01/12/22 0621 98 2 °F (36 8 °C) 70 14 141/70 98 94 % None (Room air) Lying   01/12/22 0354 98 7 °F (37 1 °C) 68 18 128/58 -- 94 % None (Room air) Lying   01/12/22 0045 -- 74 -- -- -- 95 % -- --   01/11/22 2245 -- 80 -- -- -- 94 % -- --   01/11/22 2100 -- 78 14 142/70 98 96 % None (Room air) --   01/11/22 2022 -- 84 14 151/72 -- 94 % None (Room air) --   01/11/22 1834 99 6 °F (37 6 °C) -- -- -- -- -- -- --   01/11/22 1712 -- 94 22 154/88 -- 99 % None (Room air) Lying   01/11/22 1711 101 5 °F (38 6 °C) Abnormal  -- -- -- -- -- -- --       Pertinent Labs/Diagnostic Test Results:   01/11 XR right tibia fibula: No acute osseous abnormality  Degenerative changes  Postoperative changes      Results from last 7 days   Lab Units 01/11/22  1709   SARS-COV-2  Positive*     Results from last 7 days   Lab Units 01/12/22 0619 01/11/22 1956 01/11/22  1704   WBC Thousand/uL 3 98* 4 87 5 71   HEMOGLOBIN g/dL 14 5 13 8 15 4   HEMATOCRIT % 42 6 42 0 46 0   PLATELETS Thousands/uL 151 148* 162   NEUTROS ABS Thousands/µL 2 68 3 67 4 64         Results from last 7 days   Lab Units 01/12/22 0619 01/11/22 1956 01/11/22  1704   SODIUM mmol/L 135* 132* 134*   POTASSIUM mmol/L 3 4* 2 9* 3 5   CHLORIDE mmol/L 104 101 101   CO2 mmol/L 24 26 24   ANION GAP mmol/L 7 5 9   BUN mg/dL 20 20 21   CREATININE mg/dL 1 02 1 12 1 16   EGFR ml/min/1 73sq m 59 53 50   CALCIUM mg/dL 8 3 8 6 9 9   MAGNESIUM mg/dL 2 0  --   --    PHOSPHORUS mg/dL 2 5  --   --      Results from last 7 days   Lab Units 01/12/22 0619 01/11/22 1956 01/11/22  1704   AST U/L 49* 48* 60*   ALT U/L 67 73 84*   ALK PHOS U/L 137* 141* 151*   TOTAL PROTEIN g/dL 7 4 7 7 8 5*   ALBUMIN g/dL 3 3* 3 4* 3 7   TOTAL BILIRUBIN mg/dL 0 41 0 36 1 06*         Results from last 7 days   Lab Units 01/12/22 0619 01/11/22 1956 01/11/22  1704   GLUCOSE RANDOM mg/dL 105 96 95     Results from last 7 days   Lab Units 01/12/22  0619   CK TOTAL U/L 88     Results from last 7 days   Lab Units 01/12/22  0040 01/11/22  2257 01/11/22 1956   HS TNI 0HR ng/L  --   --  21   HS TNI 2HR ng/L  --  21  --    HSTNI D2 ng/L  --  0  --    HS TNI 4HR ng/L 20  --   --    HSTNI D4 ng/L -1  --   --      Results from last 7 days   Lab Units 01/11/22 1956 D-DIMER QUANTITATIVE ug/ml FEU 0 61*             Results from last 7 days   Lab Units 01/12/22  0619 01/11/22  1956   PROCALCITONIN ng/ml 0 10 0 11  0 11     Results from last 7 days   Lab Units 01/11/22  1704   LACTIC ACID mmol/L 1 6     Results from last 7 days   Lab Units 01/12/22  0619 01/11/22  1704   CRP mg/L 66 6* 78 1*   SED RATE mm/hour  --  40*     Results from last 7 days   Lab Units 01/11/22  1709   INFLUENZA A PCR  Negative   INFLUENZA B PCR  Negative   RSV PCR  Negative     Results from last 7 days   Lab Units 01/11/22  1725   BLOOD CULTURE  Received in Microbiology Lab  Culture in Progress  Received in Microbiology Lab  Culture in Progress                 ED Treatment:   Medication Administration from 01/11/2022 1550 to 01/12/2022 0315       Date/Time Order Dose Route Action     01/11/2022 1709 morphine (PF) 4 mg/mL injection 4 mg 4 mg Intravenous Given     01/12/2022 0040 ceFAZolin (ANCEF) IVPB (premix in dextrose) 2,000 mg 50 mL 2,000 mg Intravenous New Bag     01/11/2022 1811 ceFAZolin (ANCEF) IVPB (premix in dextrose) 2,000 mg 50 mL 0 mg Intravenous Stopped     01/11/2022 1741 ceFAZolin (ANCEF) IVPB (premix in dextrose) 2,000 mg 50 mL 2,000 mg Intravenous New Bag     01/11/2022 1921 ciprofloxacin (CIPRO) IVPB (premix in 5% dextrose) 400 mg 200 mL 0 mg Intravenous Stopped     01/11/2022 1821 ciprofloxacin (CIPRO) IVPB (premix in 5% dextrose) 400 mg 200 mL 400 mg Intravenous New Bag     01/12/2022 0047 oxyCODONE (ROXICODONE) IR tablet 5 mg 5 mg Oral Given     01/11/2022 2016 oxyCODONE (ROXICODONE) IR tablet 5 mg 5 mg Oral Given     01/11/2022 1958 sodium chloride 0 9 % infusion 50 mL/hr Intravenous New Bag     01/11/2022 1958 docusate sodium (COLACE) capsule 100 mg 100 mg Oral Given     01/11/2022 2020 remdesivir (Veklury) 200 mg in sodium chloride 0 9 % 290 mL IVPB 200 mg Intravenous Given     01/12/2022 0115 ciprofloxacin (CIPRO) IVPB (premix in 5% dextrose) 400 mg 200 mL 400 mg Intravenous New Bag     01/11/2022 2200 potassium chloride (K-DUR,KLOR-CON) CR tablet 40 mEq 40 mEq Oral Given        Past Medical History:   Diagnosis Date    Hypertension     [facility]      Present on Admission:   Cellulitis of right anterior lower leg   Venous ulcer of right lower extremity with varicose veins (HCC)   COVID-19   Primary hypertension   Elevated liver enzymes   Hypokalemia   Sepsis (HCC)      Admitting Diagnosis: Cellulitis [L03 90]  Leg ulcer ([facility] [address] ) [L97 909]  Age/Sex: 64 y o  female  Admission Orders:  Daily weights  I/O  Titrate )2 to keep sat at least 90%  Incentive spirometry    Scheduled Medications:  cefazolin, 2,000 mg, Intravenous, Q8H  ciprofloxacin, 400 mg, Intravenous, Q8H  docusate sodium, 100 mg, Oral, BID  enoxaparin, 40 mg, Subcutaneous, Daily  remdesivir, 100 mg, Intravenous, Q24H  senna, 1 tablet, Oral, Daily      Continuous IV Infusions:  sodium chloride, 50 mL/hr, Intravenous, Continuous      PRN Meds:  acetaminophen, 650 mg, Oral, Q6H PRN  aluminum-magnesium hydroxide-simethicone, 30 mL, Oral, Q6H PRN  hydrALAZINE, 5 mg, Intravenous, Q6H PRN  ondansetron, 4 mg, Intravenous, Q6H PRN  oxyCODONE, 5 mg, Oral, Q4H PRN 01/12 x 1        IP CONSULT TO INFECTIOUS DISEASES    Network Utilization Review Department  ATTENTION: Please call with any questions or concerns to [phone] and carefully listen to the prompts so that you are directed to the right person  All voicemails are confidential   Cleotis Dirk all requests for admission clinical reviews, approved or denied determinations and any other requests to dedicated fax number below belonging to the campus where the patient is receiving treatment   List of dedicated fax numbers for the Facilities:  [address] DENIALS (Administrative/Medical Necessity) [phone]   [address] [place] [address] (Maternity/NICU/Pediatrics) [phone]   5000 [facility] Tony Lopez [facility] [phone]   8049 [facility] [phone]   [address] [facility] [address] [facility] [address]121 [address] [place] [address] [address] [phone]   [address] [phone]

## 2022-01-12 NOTE — ASSESSMENT & PLAN NOTE
· POA e/b fever, tachypnea    Source RLE cellulitis and COVID-19   · IV fluids per protocol  · BC x 2 pending  · IV abx  · See related plans

## 2022-01-12 NOTE — ASSESSMENT & PLAN NOTE
Venous leg ulcer of right lower extremity  She had significant pain and right lower ext and right lower leg venous ulcer     Follows with Dr Martin Meza outpatient   Vascular surgery consult

## 2022-01-12 NOTE — OCCUPATIONAL THERAPY NOTE
Occupational Therapy Evaluation     Patient Name: Fernando GIORDANO'BO Date: 1/12/2022  Problem List  Principal Problem:    Cellulitis of right anterior lower leg  Active Problems:    Primary hypertension    Venous ulcer of right lower extremity with varicose veins (HCC)    COVID-19    Elevated liver enzymes    Hypokalemia    Sepsis [facility])    Past Medical History  Past Medical History:   Diagnosis Date    Hypertension     Ulcer of right shin [facility])      Past Surgical History  History reviewed  No pertinent surgical history  01/12/22 0823   OT Last Visit   OT Visit Date 01/12/22   Note Type   Note type Evaluation   Restrictions/Precautions   Weight Bearing Precautions Per Order No   Other Precautions Multiple lines; Airborne/isolation  (COVID-19)   Pain Assessment   Pain Assessment Tool 0-10   Pain Score 5   Pain Location/Orientation Orientation: Right;Location: Leg   Hospital Pain Intervention(s) Repositioned; Ambulation/increased activity   Home Living   Type of [address] [place] [address] Two level;Bed/bath upstairs   Bathroom Shower/Tub Walk-in shower   Bathroom Toilet Standard   Prior Function   Level of [place] Independent with ADLs and functional mobility   Lives With Spouse;Daughter   Receives Help From Family   ADL Assistance Independent   IADLs Independent   Falls in the last 6 months 0   Vocational Self employed   Lifestyle   Autonomy pta pt reports I in ADls/IADLs/functional mobility   Reciprocal Relationships supportive family   Service to Others owns [address] enjoys baking   Psychosocial   Psychosocial (WDL) WDL   Subjective   Subjective "I might as well go to the bathroom   ADL   Where Assessed Chair   Eating Assistance 7  Independent   Grooming Assistance [address] [facility]  5  [address] [address]  5  [address] Assistance  5  Supervision/Setup   Transfers   Sit to Stand 6  Modified independent   Stand to Sit 6  Modified independent   Functional Mobility   Functional Mobility 6  Modified independent   Balance   Static Sitting Normal   Dynamic Sitting Good   Static Standing Good   Dynamic Standing Fair +   Ambulatory Fair +   Activity Tolerance   Activity Tolerance Patient tolerated treatment well   Medical Staff Made Aware  [facility]* MEDICAL COMPLEXITY   Nurse Made Aware KARISSA MELO Assessment   RUE Assessment WFL   LUE Assessment   LUE Assessment WFL   Hand Function   Gross Motor Coordination Functional   Fine Motor Coordination Functional   Cognition   Overall Cognitive Status WFL   Arousal/Participation Cooperative   Attention Within functional limits   Orientation Level Oriented X4   Memory Within functional limits   Following Commands Follows all commands and directions without difficulty   Comments pt pleasant and cooperative   Assessment   Prognosis Good   Assessment Pt is a 64 y o  YO  female admitted to \[facility]\"" on 1/11/2022 w/ R anterior tibial ulceration and cellulitis  Pt incidentally found to have COVID-19  Pt  has a past medical history of Hypertension and Ulcer of right shin ([address] )  Pt with active OT orders and ambulate  orders    Pt resides in a house with spouse and dtr  Pt was I w/  ADLS and IADLS, (+) drove, & required no use of DME PTA  Currently pt is supervision for ADls/IADLs/functional mobility  Pt is limited at this time 2*: pain, endurance and functional standing tolerance  The following Occupational Performance Areas to address include: functional mobility, community mobility, household maintenance and job performance/volunteering  Based on the aforementioned OT evaluation, functional performance deficits, and assessments, pt has been identified as a moderate complexity evaluation  From OT standpoint, anticipate d/c home with family support   The patient's raw score on the AM-PAC Daily Activity inpatient short form is 24, standardized score is 57 54, greater than 39 4  Patients at this level are likely to benefit from discharge to home  Please refer to the recommendation of the Occupational Therapist for safe discharge planning  Recommend continued participation in ADLs and functional mobility w/ staff  No further acute OT needs, d/c OT  Please re-consult if necessary      Goals   Patient Goals go home   Recommendation   OT Discharge Recommendation No rehabilitation needs   OT - OK to Discharge Yes   AM-PAC Daily Activity Inpatient   Lower Body Dressing 4   Bathing 4   Toileting 4   Upper Body Dressing 4   Grooming 4   Eating 4   Daily Activity Raw Score 24   Daily Activity Standardized Score (Calc for Raw Score >=11) 57 54   AM-PAC Applied Cognition Inpatient   Following a Speech/Presentation 4   Understanding Ordinary Conversation 4   Taking Medications 4   Remembering Where Things Are Placed or Put Away 4   Remembering List of 4-5 Errands 4   Taking Care of Complicated Tasks 4   Applied Cognition Raw Score 24   Applied Cognition Standardized Score 62 21   Modified [place] Scale   Modified [place] Scale 2     La Wilkerson MS, OTR/L

## 2022-01-12 NOTE — CONSULTS
Consultation - Infectious Disease   Chapis Hammond 64 y o  female MRN: [id number]  Unit/Bed#: [facility] 315-01 Encounter: [id number]      IMPRESSION & RECOMMENDATIONS:     1  Sepsis present on admission in the setting of superficial cellulitis and COVID-19 infection  Temp 101 5F and tachypnea on admission  VS now stable  Antibiotics as below  COVID treatment as below  Monitor fever curve  Blood cx pending  2  Right leg venous stasis wound with superimposed cellulitis  1 month duration of wound with previous admission iin Dec  2021 for right leg cellulitis with worsening pain  Received Bactrim prior to admission, IV Vanc/Zosyn during her last admission and most recently completed a 7d course of Augmentin and Doxcycline on 1/4/22  Outpatient wound cultures with MSSA and Group C Strep  Patient states she does not have pain at baseline  IV Ancef 1g q8  Discontinue Cipro  Serial LE exams  Wound care consult  Monitor fever curves  3  COVID-19  Incidental finding on admission  She is has not received a vaccine  Denies shortness of breath  Started on mild pathway on admission due to obesity  Is not requiring O2  Continue remdisivir per primary team   Monitor O2  Procalc within normal limits  HISTORY OF PRESENT ILLNESS:  Reason for Consult: Right leg cellulitis    HPI: Chapis Hammond is a 64y o  year old female admitted on 1/11/22 for right anterior leg cellulitis, PMH of chronic venous stasis with intervention and primary HTN  She states that she had a wound previously 12-15 years ago that was resolved by Vascular Surgery procedures  She wears compression at home for chronic stasis changes  She reports the wound re-occurred one month ago and that she had an appointment with wound care  There was concern for infection and she was started on Bactrim outpatient  Superficial cultures showed growth of MSSA and Group C Strep   The wound failed to improve and she was admitted in Dec  2021 for right leg cellulitis  MRI of the RLE revealed no osseous abnormality  During her admission she was given IV Vanc/Zosyn and discharged on a 7d course of PO Augmentin and Doxy through 22  On 22 she was evaluated by Vascular Surgery, who was concerned for a wound infection as the wound continued to remain painful and was draining  She was admitted and started on IV Ancef and Cipro  Incidentally she was COVID+ on admission and has not been vaccinated  Patient denies nausea, vomiting, chest pain, shortness of breath, chills, fever  REVIEW OF SYSTEMS:  A complete 12 point system-based review of systems is negative other than that noted in the HPI  PAST MEDICAL HISTORY:  Past Medical History:   Diagnosis Date    Hypertension     Ulcer of right shin ([address] )      History reviewed  No pertinent surgical history  FAMILY HISTORY:  Non-contributory    SOCIAL HISTORY:  Social History   Social History     Substance and Sexual Activity   Alcohol Use Not Currently     Social History     Substance and Sexual Activity   Drug Use Never     Social History     Tobacco Use   Smoking Status Never Smoker   Smokeless Tobacco Never Used       ALLERGIES:  Allergies   Allergen Reactions    Wound Dressing Adhesive Rash       MEDICATIONS:  All current active medications have been reviewed      PHYSICAL EXAM:  Temp:  [98 1 °F (36 7 °C)-104 °F (40 °C)] 98 2 °F (36 8 °C)  HR:  [68-96] 70  Resp:  [14-22] 14  BP: (128-160)/(58-90) 141/70  SpO2:  [94 %-99 %] 94 %  Temp (24hrs), Av °F (37 8 °C), Min:98 1 °F (36 7 °C), Max:104 °F (40 °C)  Current: Temperature: 98 2 °F (36 8 °C)    Intake/Output Summary (Last 24 hours) at 2022 1152  Last data filed at 2022 0900  Gross per 24 hour   Intake 530 ml   Output --   Net 530 ml       General Appearance:  Appearing well, nontoxic, and in no distress   Head:  Normocephalic, without obvious abnormality, atraumatic   Eyes:  Conjunctiva pink and sclera anicteric, both eyes   Nose: Nares normal, mucosa normal, no drainage   Throat: Oropharynx moist without lesions   Neck: Supple, symmetrical, no adenopathy, no tenderness/mass/nodules   Back:   Symmetric, no curvature, ROM normal, no CVA tenderness   Lungs:   Clear to auscultation bilaterally, respirations unlabored   Chest Wall:  No tenderness or deformity   Heart:  RRR; no murmur, rub or gallop   Abdomen:   Soft, non-tender, non-distended, positive bowel sounds    Extremities: No cyanosis, clubbing or edema   Skin: Right anterior shin wound  Fibrogranular base with minimal serous drainage  Localized erythema, violaceous borders  Tender to palpation bhaskar-wound  Lymph nodes: Cervical, supraclavicular nodes normal   Neurologic: Alert and oriented times 3, extremity strength 5/5 and symmetric       LABS, IMAGING, & OTHER STUDIES:  Lab Results:  I have personally reviewed pertinent labs  Results from last 7 days   Lab Units 01/12/22  0619 01/11/22 1956 01/11/22  1704   WBC Thousand/uL 3 98* 4 87 5 71   HEMOGLOBIN g/dL 14 5 13 8 15 4   PLATELETS Thousands/uL 151 148* 162     Results from last 7 days   Lab Units 01/12/22  0619 01/11/22 1956 01/11/22 1956 01/11/22  1704 01/11/22  1704   POTASSIUM mmol/L 3 4*   < > 2 9*   < > 3 5   CHLORIDE mmol/L 104   < > 101   < > 101   CO2 mmol/L 24   < > 26   < > 24   BUN mg/dL 20   < > 20   < > 21   CREATININE mg/dL 1 02   < > 1 12   < > 1 16   EGFR ml/min/1 73sq m 59   < > 53   < > 50   CALCIUM mg/dL 8 3   < > 8 6   < > 9 9   AST U/L 49*  --  48*  --  60*   ALT U/L 67   < > 73   < > 84*   ALK PHOS U/L 137*   < > 141*   < > 151*    < > = values in this interval not displayed  Results from last 7 days   Lab Units 01/11/22  1725   BLOOD CULTURE  Received in Microbiology Lab  Culture in Progress  Received in Microbiology Lab  Culture in Progress  Imaging Studies:   I have personally reviewed pertinent imaging study reports and images in PACS        Other Studies:   I have personally reviewed pertinent reports

## 2022-01-13 PROBLEM — R19.7 DIARRHEA: Status: ACTIVE | Noted: 2022-01-13

## 2022-01-13 LAB
ALBUMIN SERPL BCP-MCNC: 3.4 G/DL (ref 3.5–5)
ALP SERPL-CCNC: 131 U/L (ref 46–116)
ALT SERPL W P-5'-P-CCNC: 58 U/L (ref 12–78)
ANION GAP SERPL CALCULATED.3IONS-SCNC: 5 MMOL/L (ref 4–13)
AST SERPL W P-5'-P-CCNC: 54 U/L (ref 5–45)
BASOPHILS # BLD AUTO: 0.01 THOUSANDS/ΜL (ref 0–0.1)
BASOPHILS NFR BLD AUTO: 0 % (ref 0–1)
BILIRUB SERPL-MCNC: 0.39 MG/DL (ref 0.2–1)
BUN SERPL-MCNC: 14 MG/DL (ref 5–25)
CALCIUM ALBUM COR SERPL-MCNC: 9 MG/DL (ref 8.3–10.1)
CALCIUM SERPL-MCNC: 8.5 MG/DL (ref 8.3–10.1)
CHLORIDE SERPL-SCNC: 103 MMOL/L (ref 100–108)
CO2 SERPL-SCNC: 29 MMOL/L (ref 21–32)
CREAT SERPL-MCNC: 0.87 MG/DL (ref 0.6–1.3)
EOSINOPHIL # BLD AUTO: 0 THOUSAND/ΜL (ref 0–0.61)
EOSINOPHIL NFR BLD AUTO: 0 % (ref 0–6)
ERYTHROCYTE [DISTWIDTH] IN BLOOD BY AUTOMATED COUNT: 13.2 % (ref 11.6–15.1)
GFR SERPL CREATININE-BSD FRML MDRD: 72 ML/MIN/1.73SQ M
GLUCOSE SERPL-MCNC: 99 MG/DL (ref 65–140)
HCT VFR BLD AUTO: 45.1 % (ref 34.8–46.1)
HGB BLD-MCNC: 15 G/DL (ref 11.5–15.4)
IMM GRANULOCYTES # BLD AUTO: 0.02 THOUSAND/UL (ref 0–0.2)
IMM GRANULOCYTES NFR BLD AUTO: 1 % (ref 0–2)
LYMPHOCYTES # BLD AUTO: 1.12 THOUSANDS/ΜL (ref 0.6–4.47)
LYMPHOCYTES NFR BLD AUTO: 27 % (ref 14–44)
MCH RBC QN AUTO: 30.1 PG (ref 26.8–34.3)
MCHC RBC AUTO-ENTMCNC: 33.3 G/DL (ref 31.4–37.4)
MCV RBC AUTO: 91 FL (ref 82–98)
MONOCYTES # BLD AUTO: 0.54 THOUSAND/ΜL (ref 0.17–1.22)
MONOCYTES NFR BLD AUTO: 13 % (ref 4–12)
NEUTROPHILS # BLD AUTO: 2.54 THOUSANDS/ΜL (ref 1.85–7.62)
NEUTS SEG NFR BLD AUTO: 59 % (ref 43–75)
NRBC BLD AUTO-RTO: 0 /100 WBCS
PLATELET # BLD AUTO: 165 THOUSANDS/UL (ref 149–390)
PMV BLD AUTO: 9.4 FL (ref 8.9–12.7)
POTASSIUM SERPL-SCNC: 3.2 MMOL/L (ref 3.5–5.3)
PROT SERPL-MCNC: 7.7 G/DL (ref 6.4–8.2)
RBC # BLD AUTO: 4.98 MILLION/UL (ref 3.81–5.12)
SODIUM SERPL-SCNC: 137 MMOL/L (ref 136–145)
WBC # BLD AUTO: 4.23 THOUSAND/UL (ref 4.31–10.16)

## 2022-01-13 PROCEDURE — 99232 SBSQ HOSP IP/OBS MODERATE 35: CPT | Performed by: PHYSICIAN ASSISTANT

## 2022-01-13 PROCEDURE — 85025 COMPLETE CBC W/AUTO DIFF WBC: CPT | Performed by: PHYSICIAN ASSISTANT

## 2022-01-13 PROCEDURE — 87493 C DIFF AMPLIFIED PROBE: CPT | Performed by: PHYSICIAN ASSISTANT

## 2022-01-13 PROCEDURE — 99232 SBSQ HOSP IP/OBS MODERATE 35: CPT | Performed by: INTERNAL MEDICINE

## 2022-01-13 PROCEDURE — 80053 COMPREHEN METABOLIC PANEL: CPT | Performed by: PHYSICIAN ASSISTANT

## 2022-01-13 RX ORDER — POTASSIUM CHLORIDE 20 MEQ/1
40 TABLET, EXTENDED RELEASE ORAL ONCE
Status: COMPLETED | OUTPATIENT
Start: 2022-01-13 | End: 2022-01-13

## 2022-01-13 RX ADMIN — ONDANSETRON 4 MG: 2 INJECTION INTRAMUSCULAR; INTRAVENOUS at 07:45

## 2022-01-13 RX ADMIN — ENOXAPARIN SODIUM 40 MG: 40 INJECTION SUBCUTANEOUS at 08:18

## 2022-01-13 RX ADMIN — SODIUM CHLORIDE 50 ML/HR: 0.9 INJECTION, SOLUTION INTRAVENOUS at 16:52

## 2022-01-13 RX ADMIN — REMDESIVIR 100 MG: 100 INJECTION, POWDER, LYOPHILIZED, FOR SOLUTION INTRAVENOUS at 19:46

## 2022-01-13 RX ADMIN — DOCUSATE SODIUM 100 MG: 100 CAPSULE ORAL at 16:43

## 2022-01-13 RX ADMIN — ONDANSETRON 4 MG: 2 INJECTION INTRAMUSCULAR; INTRAVENOUS at 16:52

## 2022-01-13 RX ADMIN — POTASSIUM CHLORIDE 40 MEQ: 1500 TABLET, EXTENDED RELEASE ORAL at 09:33

## 2022-01-13 RX ADMIN — CEFAZOLIN SODIUM 1000 MG: 1 SOLUTION INTRAVENOUS at 00:40

## 2022-01-13 RX ADMIN — CEFAZOLIN SODIUM 1000 MG: 1 SOLUTION INTRAVENOUS at 08:33

## 2022-01-13 RX ADMIN — CEFAZOLIN SODIUM 1000 MG: 1 SOLUTION INTRAVENOUS at 16:43

## 2022-01-13 NOTE — ASSESSMENT & PLAN NOTE
· Incidentally tested positive on admission    Not vaccinated   · Mild pathway, patient is on room air  · Started on Remdesivir given risk factor of obesity   · Isolation per protocol

## 2022-01-13 NOTE — ASSESSMENT & PLAN NOTE
· POA e/b fever, tachypnea    Source RLE cellulitis and COVID-19   · IV fluids per protocol  · BC x 2 negative at 24 hrs   · IV abx  · See related plans

## 2022-01-13 NOTE — PROGRESS NOTES
Progress Note - Infectious Disease   Torito Colby 64 y o  female MRN: [id number]  Unit/Bed#: [facility] 315-01 Encounter: [id number]      Impression/Plan:    1  Right leg cellulitis likely from chronic leg ulcer  Appears to be superficial and mild in nature and clinically improving  Patient remains without leukocytosis and with stable vitals, non-toxic in appearance  Fever on admission may be related to COVID infection  Outpatient culture with growth of MSSA and group C strep, which are the likely pathogens  Continue IV Ancef 1g q8, will plan for transition to PO   Serial LE exams  Wound care consult  Monitor fever curves, EBC  Blood cx negative to date    2  COVID-19  Incidental finding on admission  She is has not received a vaccine  Denies shortness of breath  Started on mild pathway on admission due to obesity  Is not requiring O2  Continue remdesivir per primary team   Monitor O2  Monitor for respiratory symptoms  3  Chronic venous stasis with ulceration  Followed outpatient by wound care and vascular surgery  Continue local wound care  Antibiotics:  Cefazolin day #3    24 Hour events:  No acute events  Subjective:  Patient has no fever, chills, sweats; vomiting, diarrhea; no cough, shortness of breath; no pain  mild nausea resolved with Zofran No new symptoms  Objective:  Vitals:  Temp:  [98 3 °F (36 8 °C)-99 5 °F (37 5 °C)] 98 5 °F (36 9 °C)  HR:  [66-77] 75  Resp:  [16] 16  BP: (118-150)/(65-75) 118/65  SpO2:  [94 %-98 %] 98 %  Temp (24hrs), Av 8 °F (37 1 °C), Min:98 3 °F (36 8 °C), Max:99 5 °F (37 5 °C)  Current: Temperature: 98 5 °F (36 9 °C)    Physical Exam:   General Appearance:  Alert, interactive, nontoxic, no acute distress  Throat: Oropharynx moist without lesions  Lungs:   Clear to auscultation bilaterally; no wheezes, rhonchi or rales; respirations unlabored   Heart:  RRR; no murmur, rub or gallop   Abdomen:   Soft, non-tender, non-distended, positive bowel sounds  Extremities: No clubbing, cyanosis or edema   Skin: Right anterior shin wound  Fibrogranular base with minimal serous drainage  Localized erythema, violaceous borders  Tender to palpation bhaskar-wound  Labs, Imaging, & Other studies:   All pertinent labs and imaging studies were personally reviewed  Results from last 7 days   Lab Units 01/13/22 0617 01/12/22 0619 01/11/22 1956   WBC Thousand/uL 4 23* 3 98* 4 87   HEMOGLOBIN g/dL 15 0 14 5 13 8   PLATELETS Thousands/uL 165 151 148*     Results from last 7 days   Lab Units 01/13/22  0617 01/12/22 0619 01/12/22 0619 01/11/22 1956 01/11/22 1956   POTASSIUM mmol/L 3 2*   < > 3 4*   < > 2 9*   CHLORIDE mmol/L 103   < > 104   < > 101   CO2 mmol/L 29   < > 24   < > 26   BUN mg/dL 14   < > 20   < > 20   CREATININE mg/dL 0 87   < > 1 02   < > 1 12   EGFR ml/min/1 73sq m 72   < > 59   < > 53   CALCIUM mg/dL 8 5   < > 8 3   < > 8 6   AST U/L 54*  --  49*  --  48*   ALT U/L 58   < > 67   < > 73   ALK PHOS U/L 131*   < > 137*   < > 141*    < > = values in this interval not displayed  Results from last 7 days   Lab Units 01/11/22  1725   BLOOD CULTURE  No Growth at 24 hrs  No Growth at 24 hrs

## 2022-01-13 NOTE — ASSESSMENT & PLAN NOTE
· Cellulitis of right anterior lower leg, POA  · Wound grew MSSA and group C strep  · MRI of lower ext 12/26 did not show an abscess   · Patient failed bactrim, patient was placed on zosyn and vancomycin last admission at [place] [address] in December and discharged home on doxycycline 100 mg bid and augmentin 875 mg bid did not improve  · Currently on IV Ancef, continue for now  ID consulted, cipro d/c'd    Transition to PO abx pending improvement

## 2022-01-13 NOTE — ASSESSMENT & PLAN NOTE
· Venous leg ulcer of right lower extremity  · She had significant pain and right lower ext and right lower leg venous ulcer     · Follows with Dr Katherine Castro outpatient   · Vascular surgery -- no inpatient recs, will need outpt f/u

## 2022-01-13 NOTE — PROGRESS NOTES
[address] [facility]  Progress Note Windmary Quails 1960, 64 y o  female MRN: [id number]  Unit/Bed#: [facility] 315-01 Encounter: [id number]  Primary Care Provider: Bimal Espinoza   Date and time admitted to hospital: 1/11/2022  4:31 PM    * Cellulitis of right anterior lower leg  Assessment & Plan  · Cellulitis of right anterior lower leg, POA  · Wound grew MSSA and group C strep  · MRI of lower ext 12/26 did not show an abscess   · Patient failed bactrim, patient was placed on zosyn and vancomycin last admission at [facility] in December and discharged home on doxycycline 100 mg bid and augmentin 875 mg bid did not improve  · Currently on IV Ancef, continue for now  ID consulted, cipro d/c'd  Transition to PO abx pending improvement     Diarrhea  Assessment & Plan  · Pt reports diarrhea started yesterday, check stool studies     Sepsis ([facility] [address] )  Assessment & Plan  · POA e/b fever, tachypnea  Source RLE cellulitis and COVID-19   · IV fluids per protocol  · BC x 2 negative at 24 hrs   · IV abx  · See related plans     Hypokalemia  Assessment & Plan  · Replacement ordered  · Repeat labs in AM     Elevated liver enzymes  Assessment & Plan  · Mild  Possibly related to COVID-19 infection  · RUQ US pending   · Hepatitis panel negative   · Trend     COVID-19  Assessment & Plan  · Incidentally tested positive on admission  Not vaccinated   · Mild pathway, patient is on room air  · Started on Remdesivir given risk factor of obesity   · Isolation per protocol     Venous ulcer of right lower extremity with varicose veins (HCC)  Assessment & Plan  · Venous leg ulcer of right lower extremity  · She had significant pain and right lower ext and right lower leg venous ulcer  · Follows with Dr Eric Maharaj outpatient   · Vascular surgery -- no inpatient recs, will need outpt f/u     Primary hypertension  Assessment & Plan  · Blood pressure elevated on admission, now improved    Monitor         VTE Pharmacologic Prophylaxis: VTE Score: 6 High Risk (Score >/= 5) - Pharmacological DVT Prophylaxis Ordered: enoxaparin (Lovenox)  Sequential Compression Devices Ordered  Patient Centered Rounds: I performed bedside rounds with nursing staff today  Discussions with Specialists or Other Care Team Provider:     Education and Discussions with Family / Patient: Patient declined call to   Time Spent for Care: 20 minutes  More than 50% of total time spent on counseling and coordination of care as described above  Current Length of Stay: 2 day(s)  Current Patient Status: Inpatient   Certification Statement: The patient will continue to require additional inpatient hospital stay due to cellulitis on IV abx  Discharge Plan: Anticipate discharge in 24-48 hrs to home  Code Status: Level 1 - Full Code    Subjective:   Pt reports to improvement in leg pain and redness  Objective:     Vitals:   Temp (24hrs), Av 8 °F (37 1 °C), Min:98 3 °F (36 8 °C), Max:99 5 °F (37 5 °C)    Temp:  [98 3 °F (36 8 °C)-99 5 °F (37 5 °C)] 98 5 °F (36 9 °C)  HR:  [66-77] 75  Resp:  [16] 16  BP: (118-136)/(65-75) 118/65  SpO2:  [95 %-98 %] 98 %  Body mass index is 37 5 kg/m²  Input and Output Summary (last 24 hours):   No intake or output data in the 24 hours ending 22 8638    Physical Exam:   Physical Exam  Vitals reviewed  Constitutional:       General: She is not in acute distress  Appearance: She is not toxic-appearing  HENT:      Head: Normocephalic and atraumatic  Eyes:      Extraocular Movements: Extraocular movements intact  Cardiovascular:      Rate and Rhythm: Normal rate and regular rhythm  Pulmonary:      Effort: Pulmonary effort is normal  No respiratory distress  Breath sounds: Normal breath sounds  Abdominal:      General: Bowel sounds are normal  There is no distension  Palpations: Abdomen is soft  Tenderness: There is no abdominal tenderness     Musculoskeletal: General: Normal range of motion  Skin:     Findings: Lesion (RLE ulcer noted, surrounding erythema improved) present  Neurological:      General: No focal deficit present  Mental Status: She is alert and oriented to person, place, and time  Psychiatric:         Mood and Affect: Mood normal          Behavior: Behavior normal          Thought Content: Thought content normal           Additional Data:     Labs:  Results from last 7 days   Lab Units 01/13/22  0617   WBC Thousand/uL 4 23*   HEMOGLOBIN g/dL 15 0   HEMATOCRIT % 45 1   PLATELETS Thousands/uL 165   NEUTROS PCT % 59   LYMPHS PCT % 27   MONOS PCT % 13*   EOS PCT % 0     Results from last 7 days   Lab Units 01/13/22  0617   SODIUM mmol/L 137   POTASSIUM mmol/L 3 2*   CHLORIDE mmol/L 103   CO2 mmol/L 29   BUN mg/dL 14   CREATININE mg/dL 0 87   ANION GAP mmol/L 5   CALCIUM mg/dL 8 5   ALBUMIN g/dL 3 4*   TOTAL BILIRUBIN mg/dL 0 39   ALK PHOS U/L 131*   ALT U/L 58   AST U/L 54*   GLUCOSE RANDOM mg/dL 99                 Results from last 7 days   Lab Units 01/12/22  0619 01/11/22  1956 01/11/22  1704   LACTIC ACID mmol/L  --   --  1 6   PROCALCITONIN ng/ml 0 10 0 11  0 11  --        Lines/Drains:  Invasive Devices  Report    Peripheral Intravenous Line            Peripheral IV 01/11/22 Left Antecubital 1 day                      Imaging: No pertinent imaging reviewed  Recent Cultures (last 7 days):   Results from last 7 days   Lab Units 01/11/22  1725   BLOOD CULTURE  No Growth at 24 hrs  No Growth at 24 hrs         Last 24 Hours Medication List:   Current Facility-Administered Medications   Medication Dose Route Frequency Provider Last Rate    acetaminophen  650 mg Oral Q6H PRN Salina Bamberger, MD      aluminum-magnesium hydroxide-simethicone  30 mL Oral Q6H PRN Salina Bamberger, MD      cefazolin  1,000 mg Intravenous Q8H Meghan Lloyd MD 1,000 mg (01/13/22 0367)    docusate sodium  100 mg Oral BID Salina Bamberger, MD      enoxaparin 40 mg Subcutaneous Daily Annalisa Waters MD      hydrALAZINE  5 mg Intravenous Q6H PRN Annalisa Waters MD      ondansetron  4 mg Intravenous Q6H PRN Annalisa Waters MD      oxyCODONE  5 mg Oral Q4H PRN Annalisa Waters MD      remdesivir  100 mg Intravenous Q24H Annalisa Waters MD      senna  1 tablet Oral Daily Annalisa Waters MD      sodium chloride  50 mL/hr Intravenous Continuous Annalisa Waters MD 50 mL/hr (01/12/22 2230)        Today, Patient Was Seen By: Taylor Ghosh PA-C    **Please Note: This note may have been constructed using a voice recognition system  **

## 2022-01-13 NOTE — PLAN OF CARE
Problem: Potential for Falls  Goal: Patient will remain free of falls  Description: INTERVENTIONS:  - Educate patient/family on patient safety including physical limitations  - Instruct patient to call for assistance with activity   - Consult OT/PT to assist with strengthening/mobility   - Keep Call bell within reach  - Keep bed low and locked with side rails adjusted as appropriate  - Keep care items and personal belongings within reach  - Initiate and maintain comfort rounds  - Make Fall Risk Sign visible to staff  - Offer Toileting every    Hours, in advance of need  - Initiate/Maintain   alarm  - Obtain necessary fall risk management equipment:     - Apply yellow socks and bracelet for high fall risk patients  - Consider moving patient to room near nurses station  Outcome: Progressing     Problem: PAIN - ADULT  Goal: Verbalizes/displays adequate comfort level or baseline comfort level  Description: Interventions:  - Encourage patient to monitor pain and request assistance  - Assess pain using appropriate pain scale  - Administer analgesics based on type and severity of pain and evaluate response  - Implement non-pharmacological measures as appropriate and evaluate response  - Consider cultural and social influences on pain and pain management  - Notify physician/advanced practitioner if interventions unsuccessful or patient reports new pain  Outcome: Progressing     Problem: INFECTION - ADULT  Goal: Absence or prevention of progression during hospitalization  Description: INTERVENTIONS:  - Assess and monitor for signs and symptoms of infection  - Monitor lab/diagnostic results  - Monitor all insertion sites, i e  indwelling lines, tubes, and drains  - Monitor endotracheal if appropriate and nasal secretions for changes in amount and color  - [place] appropriate cooling/warming therapies per order  - Administer medications as ordered  - Instruct and encourage patient and family to use good hand hygiene technique  - Identify and instruct in appropriate isolation precautions for identified infection/condition  Outcome: Progressing  Goal: Absence of fever/infection during neutropenic period  Description: INTERVENTIONS:  - Monitor WBC    Outcome: Progressing     Problem: SAFETY ADULT  Goal: Patient will remain free of falls  Description: INTERVENTIONS:  - Educate patient/family on patient safety including physical limitations  - Instruct patient to call for assistance with activity   - Consult OT/PT to assist with strengthening/mobility   - Keep Call bell within reach  - Keep bed low and locked with side rails adjusted as appropriate  - Keep care items and personal belongings within reach  - Initiate and maintain comfort rounds  - Make Fall Risk Sign visible to staff  - Offer Toileting every      Hours, in advance of need  - Initiate/Maintain alarm  - Obtain necessary fall risk management equipment:       - Apply yellow socks and bracelet for high fall risk patients  - Consider moving patient to room near nurses station  Outcome: Progressing  Goal: Maintain or return to baseline ADL function  Description: INTERVENTIONS:  -  Assess patient's ability to carry out ADLs; assess patient's baseline for ADL function and identify physical deficits which impact ability to perform ADLs (bathing, care of mouth/teeth, toileting, grooming, dressing, etc )  - Assess/evaluate cause of self-care deficits   - Assess range of motion  - Assess patient's mobility; develop plan if impaired  - Assess patient's need for assistive devices and provide as appropriate  - Encourage maximum independence but intervene and supervise when necessary  - Involve family in performance of ADLs  - Assess for home care needs following discharge   - Consider OT consult to assist with ADL evaluation and planning for discharge  - Provide patient education as appropriate  Outcome: Progressing     Problem: DISCHARGE PLANNING  Goal: Discharge to home or other facility with appropriate resources  Description: INTERVENTIONS:  - Identify barriers to discharge w/patient and caregiver  - Arrange for needed discharge resources and transportation as appropriate  - Identify discharge learning needs (meds, wound care, etc )  - Arrange for interpretive services to assist at discharge as needed  - Refer to Case Management Department for coordinating discharge planning if the patient needs post-hospital services based on physician/advanced practitioner order or complex needs related to functional status, cognitive ability, or social support system  Outcome: Progressing     Problem: Knowledge Deficit  Goal: Patient/family/caregiver demonstrates understanding of disease process, treatment plan, medications, and discharge instructions  Description: Complete learning assessment and assess knowledge base    Interventions:  - Provide teaching at level of understanding  - Provide teaching via preferred learning methods  Outcome: Progressing

## 2022-01-13 NOTE — ASSESSMENT & PLAN NOTE
· Mild    Possibly related to COVID-19 infection  · RUQ US pending   · Hepatitis panel negative   · Trend

## 2022-01-13 NOTE — PLAN OF CARE
Problem: Potential for Falls  Goal: Patient will remain free of falls  Description: INTERVENTIONS:  - Educate patient/family on patient safety including physical limitations  - Instruct patient to call for assistance with activity   - Consult OT/PT to assist with strengthening/mobility   - Keep Call bell within reach  - Keep bed low and locked with side rails adjusted as appropriate  - Keep care items and personal belongings within reach  - Initiate and maintain comfort rounds  - Make Fall Risk Sign visible to staff  - Apply yellow socks and bracelet for high fall risk patients  - Consider moving patient to room near nurses station  Outcome: Progressing     Problem: PAIN - ADULT  Goal: Verbalizes/displays adequate comfort level or baseline comfort level  Description: Interventions:  - Encourage patient to monitor pain and request assistance  - Assess pain using appropriate pain scale  - Administer analgesics based on type and severity of pain and evaluate response  - Implement non-pharmacological measures as appropriate and evaluate response  - Consider cultural and social influences on pain and pain management  - Notify physician/advanced practitioner if interventions unsuccessful or patient reports new pain  Outcome: Progressing     Problem: INFECTION - ADULT  Goal: Absence or prevention of progression during hospitalization  Description: INTERVENTIONS:  - Assess and monitor for signs and symptoms of infection  - Monitor lab/diagnostic results  - Monitor all insertion sites, i e  indwelling lines, tubes, and drains  - Monitor endotracheal if appropriate and nasal secretions for changes in amount and color  - [place] appropriate cooling/warming therapies per order  - Administer medications as ordered  - Instruct and encourage patient and family to use good hand hygiene technique  - Identify and instruct in appropriate isolation precautions for identified infection/condition  Outcome: Progressing  Goal: Absence of fever/infection during neutropenic period  Description: INTERVENTIONS:  - Monitor WBC    Outcome: Progressing     Problem: SAFETY ADULT  Goal: Patient will remain free of falls  Description: INTERVENTIONS:  - Educate patient/family on patient safety including physical limitations  - Instruct patient to call for assistance with activity   - Consult OT/PT to assist with strengthening/mobility   - Keep Call bell within reach  - Keep bed low and locked with side rails adjusted as appropriate  - Keep care items and personal belongings within reach  - Initiate and maintain comfort rounds  - Make Fall Risk Sign visible to staff  - Apply yellow socks and bracelet for high fall risk patients  - Consider moving patient to room near nurses station  Outcome: Progressing  Goal: Maintain or return to baseline ADL function  Description: INTERVENTIONS:  -  Assess patient's ability to carry out ADLs; assess patient's baseline for ADL function and identify physical deficits which impact ability to perform ADLs (bathing, care of mouth/teeth, toileting, grooming, dressing, etc )  - Assess/evaluate cause of self-care deficits   - Assess range of motion  - Assess patient's mobility; develop plan if impaired  - Assess patient's need for assistive devices and provide as appropriate  - Encourage maximum independence but intervene and supervise when necessary  - Involve family in performance of ADLs  - Assess for home care needs following discharge   - Consider OT consult to assist with ADL evaluation and planning for discharge  - Provide patient education as appropriate  Outcome: Progressing     Problem: DISCHARGE PLANNING  Goal: Discharge to home or other facility with appropriate resources  Description: INTERVENTIONS:  - Identify barriers to discharge w/patient and caregiver  - Arrange for needed discharge resources and transportation as appropriate  - Identify discharge learning needs (meds, wound care, etc )  - Arrange for interpretive services to assist at discharge as needed  - Refer to Case Management Department for coordinating discharge planning if the patient needs post-hospital services based on physician/advanced practitioner order or complex needs related to functional status, cognitive ability, or social support system  Outcome: Progressing     Problem: Knowledge Deficit  Goal: Patient/family/caregiver demonstrates understanding of disease process, treatment plan, medications, and discharge instructions  Description: Complete learning assessment and assess knowledge base    Interventions:  - Provide teaching at level of understanding  - Provide teaching via preferred learning methods  Outcome: Progressing

## 2022-01-14 VITALS
TEMPERATURE: 98.4 F | SYSTOLIC BLOOD PRESSURE: 147 MMHG | BODY MASS INDEX: 37.89 KG/M2 | RESPIRATION RATE: 16 BRPM | HEIGHT: 62 IN | OXYGEN SATURATION: 95 % | WEIGHT: 205.91 LBS | DIASTOLIC BLOOD PRESSURE: 70 MMHG | HEART RATE: 67 BPM

## 2022-01-14 LAB
ANION GAP SERPL CALCULATED.3IONS-SCNC: 7 MMOL/L (ref 4–13)
BUN SERPL-MCNC: 13 MG/DL (ref 5–25)
C DIFF TOX GENS STL QL NAA+PROBE: NEGATIVE
CALCIUM SERPL-MCNC: 8.3 MG/DL (ref 8.3–10.1)
CHLORIDE SERPL-SCNC: 105 MMOL/L (ref 100–108)
CO2 SERPL-SCNC: 27 MMOL/L (ref 21–32)
CREAT SERPL-MCNC: 0.74 MG/DL (ref 0.6–1.3)
ERYTHROCYTE [DISTWIDTH] IN BLOOD BY AUTOMATED COUNT: 13.2 % (ref 11.6–15.1)
GFR SERPL CREATININE-BSD FRML MDRD: 87 ML/MIN/1.73SQ M
GLUCOSE SERPL-MCNC: 108 MG/DL (ref 65–140)
HCT VFR BLD AUTO: 43.4 % (ref 34.8–46.1)
HGB BLD-MCNC: 14.5 G/DL (ref 11.5–15.4)
MCH RBC QN AUTO: 30.1 PG (ref 26.8–34.3)
MCHC RBC AUTO-ENTMCNC: 33.4 G/DL (ref 31.4–37.4)
MCV RBC AUTO: 90 FL (ref 82–98)
PLATELET # BLD AUTO: 149 THOUSANDS/UL (ref 149–390)
PMV BLD AUTO: 9.3 FL (ref 8.9–12.7)
POTASSIUM SERPL-SCNC: 3.1 MMOL/L (ref 3.5–5.3)
RBC # BLD AUTO: 4.81 MILLION/UL (ref 3.81–5.12)
SODIUM SERPL-SCNC: 139 MMOL/L (ref 136–145)
WBC # BLD AUTO: 3.91 THOUSAND/UL (ref 4.31–10.16)

## 2022-01-14 PROCEDURE — 99239 HOSP IP/OBS DSCHRG MGMT >30: CPT | Performed by: NURSE PRACTITIONER

## 2022-01-14 PROCEDURE — 80048 BASIC METABOLIC PNL TOTAL CA: CPT | Performed by: PHYSICIAN ASSISTANT

## 2022-01-14 PROCEDURE — 99232 SBSQ HOSP IP/OBS MODERATE 35: CPT | Performed by: INTERNAL MEDICINE

## 2022-01-14 PROCEDURE — 85027 COMPLETE CBC AUTOMATED: CPT | Performed by: PHYSICIAN ASSISTANT

## 2022-01-14 RX ORDER — CEPHALEXIN 500 MG/1
500 CAPSULE ORAL EVERY 6 HOURS SCHEDULED
Qty: 44 CAPSULE | Refills: 0 | Status: SHIPPED | OUTPATIENT
Start: 2022-01-14 | End: 2022-01-26 | Stop reason: HOSPADM

## 2022-01-14 RX ORDER — POTASSIUM CHLORIDE 20 MEQ/1
40 TABLET, EXTENDED RELEASE ORAL ONCE
Status: COMPLETED | OUTPATIENT
Start: 2022-01-14 | End: 2022-01-14

## 2022-01-14 RX ADMIN — CEFAZOLIN SODIUM 1000 MG: 1 SOLUTION INTRAVENOUS at 00:56

## 2022-01-14 RX ADMIN — POTASSIUM CHLORIDE 40 MEQ: 1500 TABLET, EXTENDED RELEASE ORAL at 11:47

## 2022-01-14 RX ADMIN — ACETAMINOPHEN 650 MG: 325 TABLET, FILM COATED ORAL at 02:27

## 2022-01-14 RX ADMIN — CEFAZOLIN SODIUM 1000 MG: 1 SOLUTION INTRAVENOUS at 08:43

## 2022-01-14 RX ADMIN — ENOXAPARIN SODIUM 40 MG: 40 INJECTION SUBCUTANEOUS at 08:07

## 2022-01-14 NOTE — DISCHARGE INSTRUCTIONS
Cleanse right leg wound with saline and gauze  Pat dry  Apply silvasorb gel to wound bed, cover with adaptic and ABD  Wrap with oliver  Change daily

## 2022-01-14 NOTE — ASSESSMENT & PLAN NOTE
· Cellulitis of right anterior lower leg, POA  · Wound grew MSSA and group C strep  · MRI of lower ext 12/26 did not show an abscess   · Patient failed bactrim, patient was placed on zosyn and vancomycin last admission at [facility] in December and discharged home on doxycycline 100 mg bid and augmentin 875 mg bid did not improve  · Currently on IV Ancef, transitioned to po keflex at discharge to complete 14 days     · ID appreciated

## 2022-01-14 NOTE — ASSESSMENT & PLAN NOTE
· POA e/b fever, tachypnea    Source RLE cellulitis and COVID-19   · IV fluids per protocol discontinued   · BC x 2 negative at 72 hrs   · IV abx transitioned to po   · See related plans

## 2022-01-14 NOTE — PROGRESS NOTES
Progress Note - Infectious Disease   Jerica Mao 64 y o  female MRN: [id number]  Unit/Bed#: [facility] 315-01 Encounter: [id number]      Impression/Recommendations:  1  Right leg cellulitis  Source is most likely the chronic leg ulcer  This appears to be superficial and mild  Patient has no leukocytosis  She has low-grade fever but fever may be all secondary to COVID  Patient had recent outpatient wound culture growing MSSA and group C strep  These are likely pathogens again  Patient is systemically well, without clinical sepsis or systemic toxicity  Cellulitis is much improved on IV cefazolin  This time around, will treat with a more prolonged antibiotic course  Admission blood cultures have no growth thus far  Continue IV cefazolin  Serial exams  Monitor temperature/WBC  Follow-up on pending blood cultures  Transition to p o  Keflex at discharge  Treat x 14 days total antibiotic this time around      2  COVID  Patient is unvaccinated  She has no respiratory symptoms  She is not hypoxic and not requiring O2 support  Patient had fever on admission but that may be all secondary to cellulitis  Remdesivir was already started by the primary service  Patient remains respiratory well  Continue remdesivir per primary service  Monitor for development of respiratory symptoms  Monitor O2 saturation  Can discontinue remdesivir at discharge      3  Venous stasis with chronic leg ulcer  Patient is being followed by vascular surgery  Leg edema much improved with leg elevation  Local ulcer care  Continue aggressive leg elevation  Wound care follow-up      Discussed with patient in detail regarding the above plan  Discussed with slim service  Okay for discharge from ID viewpoint      Antibiotics:  Cefazolin  Antibiotic # 4     Subjective:  Nausea much improved  No dyspnea or cough  Pain in right leg much improved  Temperature stays down  No chills  No diarrhea      Objective:  Vitals:  Temp:  [98 4 °F (36 9 °C)-98 7 °F (37 1 °C)] 98 4 °F (36 9 °C)  HR:  [67-72] 67  Resp:  [15-16] 16  BP: (124-147)/(70-71) 147/70  SpO2:  [95 %] 95 %  Temp (24hrs), Av 6 °F (37 °C), Min:98 4 °F (36 9 °C), Max:98 7 °F (37 1 °C)  Current: Temperature: 98 4 °F (36 9 °C)    Physical Exam:     General: Awake, alert, cooperative, no distress  Neck:  Supple  No mass  No lymphadenopathy  Lungs: Expansion symmetric, no rales, no wheezing, respirations unlabored  Heart:  Regular rate and rhythm, S1 and S2 normal, no murmur  Abdomen: Soft, nondistended, non-tender, bowel sounds active all four quadrants, no masses, no organomegaly  Extremities: Improved leg edema  Stable chronic right lower leg ulcer  No purulence  Much improved erythema/warmth  Much improved tenderness, minimal now  No fluctuance      Skin:  No rash  Neuro: Moves all extremities  Invasive Devices  Report    Peripheral Intravenous Line            Peripheral IV 22 Left Antecubital 2 days                Labs studies:   I have personally reviewed pertinent labs  Results from last 7 days   Lab Units 22  0622   POTASSIUM mmol/L 3 1* 3 2* 3 4*   < > 2 9*   CHLORIDE mmol/L 105 103 104   < > 101   CO2 mmol/L 27 29 24   < > 26   BUN mg/dL 13 14 20   < > 20   CREATININE mg/dL 0 74 0 87 1 02   < > 1 12   EGFR ml/min/1 73sq m 87 72 59   < > 53   CALCIUM mg/dL 8 3 8 5 8 3   < > 8 6   AST U/L  --  54* 49*  --  48*   ALT U/L  --  58 67  --  73   ALK PHOS U/L  --  131* 137*  --  141*    < > = values in this interval not displayed  Results from last 7 days   Lab Units 22  0611 22   WBC Thousand/uL 3 91* 4 23* 3 98*   HEMOGLOBIN g/dL 14 5 15 0 14 5   PLATELETS Thousands/uL 149 165 151     Results from last 7 days   Lab Units 22  1725   BLOOD CULTURE  No Growth at 48 hrs  No Growth at 48 hrs         Imaging Studies:   I have personally reviewed pertinent imaging study reports and images in PACS  EKG, Pathology, and Other Studies:   I have personally reviewed pertinent reports

## 2022-01-14 NOTE — PLAN OF CARE
Problem: Potential for Falls  Goal: Patient will remain free of falls  Description: INTERVENTIONS:  - Educate patient/family on patient safety including physical limitations  - Instruct patient to call for assistance with activity   - Consult OT/PT to assist with strengthening/mobility   - Keep Call bell within reach  - Keep bed low and locked with side rails adjusted as appropriate  - Keep care items and personal belongings within reach  - Initiate and maintain comfort rounds  - Make Fall Risk Sign visible to staff  - Apply yellow socks and bracelet for high fall risk patients  - Consider moving patient to room near nurses station  Outcome: Progressing     Problem: PAIN - ADULT  Goal: Verbalizes/displays adequate comfort level or baseline comfort level  Description: Interventions:  - Encourage patient to monitor pain and request assistance  - Assess pain using appropriate pain scale  - Administer analgesics based on type and severity of pain and evaluate response  - Implement non-pharmacological measures as appropriate and evaluate response  - Consider cultural and social influences on pain and pain management  - Notify physician/advanced practitioner if interventions unsuccessful or patient reports new pain  Outcome: Progressing     Problem: INFECTION - ADULT  Goal: Absence or prevention of progression during hospitalization  Description: INTERVENTIONS:  - Assess and monitor for signs and symptoms of infection  - Monitor lab/diagnostic results  - Monitor all insertion sites, i e  indwelling lines, tubes, and drains  - Monitor endotracheal if appropriate and nasal secretions for changes in amount and color  - [place] appropriate cooling/warming therapies per order  - Administer medications as ordered  - Instruct and encourage patient and family to use good hand hygiene technique  - Identify and instruct in appropriate isolation precautions for identified infection/condition  Outcome: Progressing  Goal: Absence of fever/infection during neutropenic period  Description: INTERVENTIONS:  - Monitor WBC    Outcome: Progressing     Problem: SAFETY ADULT  Goal: Patient will remain free of falls  Description: INTERVENTIONS:  - Educate patient/family on patient safety including physical limitations  - Instruct patient to call for assistance with activity   - Consult OT/PT to assist with strengthening/mobility   - Keep Call bell within reach  - Keep bed low and locked with side rails adjusted as appropriate  - Keep care items and personal belongings within reach  - Initiate and maintain comfort rounds  - Make Fall Risk Sign visible to staff  - Apply yellow socks and bracelet for high fall risk patients  - Consider moving patient to room near nurses station  Outcome: Progressing  Goal: Maintain or return to baseline ADL function  Description: INTERVENTIONS:  -  Assess patient's ability to carry out ADLs; assess patient's baseline for ADL function and identify physical deficits which impact ability to perform ADLs (bathing, care of mouth/teeth, toileting, grooming, dressing, etc )  - Assess/evaluate cause of self-care deficits   - Assess range of motion  - Assess patient's mobility; develop plan if impaired  - Assess patient's need for assistive devices and provide as appropriate  - Encourage maximum independence but intervene and supervise when necessary  - Involve family in performance of ADLs  - Assess for home care needs following discharge   - Consider OT consult to assist with ADL evaluation and planning for discharge  - Provide patient education as appropriate  Outcome: Progressing     Problem: DISCHARGE PLANNING  Goal: Discharge to home or other facility with appropriate resources  Description: INTERVENTIONS:  - Identify barriers to discharge w/patient and caregiver  - Arrange for needed discharge resources and transportation as appropriate  - Identify discharge learning needs (meds, wound care, etc )  - Arrange for interpretive services to assist at discharge as needed  - Refer to Case Management Department for coordinating discharge planning if the patient needs post-hospital services based on physician/advanced practitioner order or complex needs related to functional status, cognitive ability, or social support system  Outcome: Progressing     Problem: Knowledge Deficit  Goal: Patient/family/caregiver demonstrates understanding of disease process, treatment plan, medications, and discharge instructions  Description: Complete learning assessment and assess knowledge base    Interventions:  - Provide teaching at level of understanding  - Provide teaching via preferred learning methods  Outcome: Progressing

## 2022-01-14 NOTE — DISCHARGE SUMMARY
[address]  Discharge- Ruiz Meter 1960, 64 y o  female MRN: [id number]  Unit/Bed#: [facility] 315-01 Encounter: [id number]  Primary Care Provider: Sheila Art   Date and time admitted to hospital: 1/11/2022  4:31 PM    * Cellulitis of right anterior lower leg  Assessment & Plan  · Cellulitis of right anterior lower leg, POA  · Wound grew MSSA and group C strep  · MRI of lower ext 12/26 did not show an abscess   · Patient failed bactrim, patient was placed on zosyn and vancomycin last admission at [facility] in December and discharged home on doxycycline 100 mg bid and augmentin 875 mg bid did not improve  · Currently on IV Ancef, transitioned to po keflex at discharge to complete 14 days  · ID appreciated     Diarrhea  Assessment & Plan  · Pt reports diarrhea started yesterday, check stool studies     Sepsis ([facility] [address] )  Assessment & Plan  · POA e/b fever, tachypnea  Source RLE cellulitis and COVID-19   · IV fluids per protocol discontinued   · BC x 2 negative at 72 hrs   · IV abx transitioned to po   · See related plans     Hypokalemia  Assessment & Plan  · Replacement ordered  · Repeat labs in AM     Elevated liver enzymes  Assessment & Plan  · Mild  Possibly related to COVID-19 infection  · RUQ US pending   · Hepatitis panel negative   · Trend     COVID-19  Assessment & Plan  · Incidentally tested positive on admission  Not vaccinated   · Mild pathway, patient is on room air  · Started on Remdesivir given risk factor of obesity   · Isolation per protocol     Venous ulcer of right lower extremity with varicose veins (HCC)  Assessment & Plan  · Venous leg ulcer of right lower extremity  · She had significant pain and right lower ext and right lower leg venous ulcer  · Follows with Dr Roscoe Parrish outpatient   · Vascular surgery -- no inpatient recs, will need outpt f/u     Primary hypertension  Assessment & Plan  · Blood pressure elevated on admission, now improved    Monitor Medical Problems             Resolved Problems  Date Reviewed: 1/14/2022    None              Discharging Physician / Practitioner: Stefanie Patel, [address]  PCP: Marcus Sloan  Admission Date:   Admission Orders (From admission, onward)     Ordered        01/11/22 1845  INPATIENT ADMISSION  Once                      Discharge Date: 01/14/22    Consultations During Hospital Stay:  · ID Dr Eduardo Restrepo     Procedures Performed:   · WBC 3 31  · Hepatitis panel negative   · HIV 1 and 2 non reactive   · creactive 66 6  · Cdiff Negative   · Tibia/fibula xray: No acute osseous abnormality   Degenerative changes   Postoperative changes  · cdiff negative   · Blood cultures no growth at 72 hrs  · covid 1/11/22 positive       Significant Findings / Test Results:   · See above     Incidental Findings:   · none     Test Results Pending at Discharge (will require follow up): · Blood cultures still at 72 hrs to be completed      Outpatient Tests Requested:  · Follow up with vascular once out of isolation   · Please call to schedule follow up with pcp when able to post isolation     Complications:  None     Reason for Admission: no plan for readmission     Hospital Course:   Teresa Webb is a 64 y o  female patient who originally presented to the hospital on 1/11/2022 due to fever  Patient with past medical history of venous stasis ulcer and cellulitis of the shin of the right lower extremity presents with fever of 102  1  Patient was recently discharged on antibiotics doxycycline and Augmentin but without improvement of cellulitis  She was seen in the vascular office and they sent her to the ED  Patient noted to have draining ulcer with cellulitis of the right shin area and incidentally patient was also noted to be positive for COVID  Patient was initiated on the mild pathway started on remdesivir per admitting provider  Patient had consultations with ID    Upon being seen by ID suspicion for patient's fever was felt to be more likely patient's incidental diagnosis of COVID and not her right leg cellulitis although her wound appeared to be superficial and mild with no leukocytosis  Patient had recent outpatient wound culture growing MSSA and group C strep  Suspicions for Wound pathogens were likely the same  Patient was noted to be sits STEMI clean well overall without clinical sepsis or systemic toxicity  Antibiotic regimen was deescalated from IV cefazolin and ciprofloxacin that was initially initiated was discontinued  At discharge patient was discharged per ID on 01/14/2020 to with negative blood cultures at 72 hours on Keflex 500 mg q 6 hours for an additional total of 10 more days to complete 14 days of antibiotic therapy  Patient was instructed to continue dressing changes as prior to monitor site for any erythema or drainage  At discharge patient's wound was noted not to be draining in no erythema noted patient's wound appeared stable  Recommendations for patient to call vascular and continue follow-up once medically cleared from [address] [place] [address]  From a COVID standpoint patient was noted to be asymptomatic  She did however complete 3 days IV remdesivir which was discontinued at day of discharge  Patient should call to schedule follow-up with PCP once cleared from [facility]      Please see above list of diagnoses and related plan for additional information  Condition at Discharge: fair    Discharge Day Visit / Exam:   Subjective: At time of visit patient was doing well had no pain in lower extremity had no noted drainage or erythema no tenderness  She stated that wound looked really good in comparison  She had no nausea vomiting or diarrhea no respiratory symptoms no cough  Denied any sore throat no dizziness or lightheadedness no chest pain or palpitations  Patient was eager to be discharged from the hospital and follow-up as an outpatient    Vitals: Blood Pressure: 147/70 (01/14/22 0744)  Pulse: 67 (01/14/22 0744)  Temperature: 98 4 °F (36 9 °C) (01/14/22 0744)  Temp Source: Oral (01/13/22 1500)  Respirations: 16 (01/14/22 0744)  Height: 5' 2" (157 5 cm) (01/12/22 0354)  Weight - Scale: 93 4 kg (205 lb 14 6 oz) (01/14/22 0600)  SpO2: 95 % (01/13/22 1500)  Exam:   Physical Exam  Constitutional:       General: She is not in acute distress  Appearance: She is not ill-appearing, toxic-appearing or diaphoretic  HENT:      Head: Normocephalic and atraumatic  Nose: No congestion  Cardiovascular:      Rate and Rhythm: Normal rate  Heart sounds: No murmur heard  No friction rub  No gallop  Pulmonary:      Effort: No respiratory distress  Breath sounds: No stridor  No wheezing, rhonchi or rales  Chest:      Chest wall: No tenderness  Abdominal:      General: There is no distension  Palpations: There is no mass  Tenderness: There is no abdominal tenderness  There is no right CVA tenderness, left CVA tenderness, guarding or rebound  Hernia: No hernia is present  Musculoskeletal:         General: No swelling, tenderness, deformity or signs of injury  Right lower leg: No edema  Left lower leg: No edema  Skin:     Coloration: Skin is not pale  Findings: Lesion (skin wound on anterior shin area looks well no erythhema no acitve drainage currently open to air ) present  No erythema or rash  Neurological:      Mental Status: She is alert and oriented to person, place, and time  Psychiatric:         Behavior: Behavior normal           Discussion with Family: Patient declined call to   Discharge instructions/Information to patient and family:   See after visit summary for information provided to patient and family  Provisions for Follow-Up Care:  See after visit summary for information related to follow-up care and any pertinent home health orders         Disposition:   Home    Planned Readmission: no plan for readmission      Discharge Statement:  I spent 45 minutes discharging the patient  This time was spent on the day of discharge  I had direct contact with the patient on the day of discharge  Greater than 50% of the total time was spent examining patient, answering all patient questions, arranging and discussing plan of care with patient as well as directly providing post-discharge instructions  Additional time then spent on discharge activities  Discharge Medications:  See after visit summary for reconciled discharge medications provided to patient and/or family        **Please Note: This note may have been constructed using a voice recognition system**

## 2022-01-14 NOTE — PLAN OF CARE
Problem: Potential for Falls  Goal: Patient will remain free of falls  Description: INTERVENTIONS:  - Educate patient/family on patient safety including physical limitations  - Instruct patient to call for assistance with activity   - Consult OT/PT to assist with strengthening/mobility   - Keep Call bell within reach  - Keep bed low and locked with side rails adjusted as appropriate  - Keep care items and personal belongings within reach  - Initiate and maintain comfort rounds  - Make Fall Risk Sign visible to staff  - Offer Toileting every  Hours, in advance of need  - Initiate/Maintain alarm  - Obtain necessary fall risk management equipment:  - Apply yellow socks and bracelet for high fall risk patients  - Consider moving patient to room near nurses station  1/14/2022 1513 by Carter Fernandez RN  Outcome: Adequate for Discharge  1/14/2022 0734 by Carter Fernandez RN  Outcome: Progressing     Problem: PAIN - ADULT  Goal: Verbalizes/displays adequate comfort level or baseline comfort level  Description: Interventions:  - Encourage patient to monitor pain and request assistance  - Assess pain using appropriate pain scale  - Administer analgesics based on type and severity of pain and evaluate response  - Implement non-pharmacological measures as appropriate and evaluate response  - Consider cultural and social influences on pain and pain management  - Notify physician/advanced practitioner if interventions unsuccessful or patient reports new pain  1/14/2022 1513 by Carter Fernandez RN  Outcome: Adequate for Discharge  1/14/2022 0734 by Carter Fernandez RN  Outcome: Progressing     Problem: INFECTION - ADULT  Goal: Absence or prevention of progression during hospitalization  Description: INTERVENTIONS:  - Assess and monitor for signs and symptoms of infection  - Monitor lab/diagnostic results  - Monitor all insertion sites, i e  indwelling lines, tubes, and drains  - Monitor endotracheal if appropriate and nasal secretions for changes in amount and color  - [place] appropriate cooling/warming therapies per order  - Administer medications as ordered  - Instruct and encourage patient and family to use good hand hygiene technique  - Identify and instruct in appropriate isolation precautions for identified infection/condition  1/14/2022 1513 by Lemuel Horton RN  Outcome: Adequate for Discharge  1/14/2022 0734 by Lemuel Horton RN  Outcome: Progressing  Goal: Absence of fever/infection during neutropenic period  Description: INTERVENTIONS:  - Monitor WBC    1/14/2022 1513 by Lemuel Horton RN  Outcome: Adequate for Discharge  1/14/2022 0734 by Lemuel Horton RN  Outcome: Progressing     Problem: SAFETY ADULT  Goal: Patient will remain free of falls  Description: INTERVENTIONS:  - Educate patient/family on patient safety including physical limitations  - Instruct patient to call for assistance with activity   - Consult OT/PT to assist with strengthening/mobility   - Keep Call bell within reach  - Keep bed low and locked with side rails adjusted as appropriate  - Keep care items and personal belongings within reach  - Initiate and maintain comfort rounds  - Make Fall Risk Sign visible to staff  - Offer Toileting every  Hours, in advance of need  - Initiate/Maintain alarm  - Obtain necessary fall risk management equipment:   - Apply yellow socks and bracelet for high fall risk patients  - Consider moving patient to room near nurses station  1/14/2022 1513 by Lemuel Horton RN  Outcome: Adequate for Discharge  1/14/2022 0734 by Lemuel Horton RN  Outcome: Progressing  Goal: Maintain or return to baseline ADL function  Description: INTERVENTIONS:  -  Assess patient's ability to carry out ADLs; assess patient's baseline for ADL function and identify physical deficits which impact ability to perform ADLs (bathing, care of mouth/teeth, toileting, grooming, dressing, etc )  - Assess/evaluate cause of self-care deficits   - Assess range of motion  - Assess patient's mobility; develop plan if impaired  - Assess patient's need for assistive devices and provide as appropriate  - Encourage maximum independence but intervene and supervise when necessary  - Involve family in performance of ADLs  - Assess for home care needs following discharge   - Consider OT consult to assist with ADL evaluation and planning for discharge  - Provide patient education as appropriate  1/14/2022 1513 by Zainab Proctor RN  Outcome: Adequate for Discharge  1/14/2022 0734 by Zainab Proctor RN  Outcome: Progressing     Problem: DISCHARGE PLANNING  Goal: Discharge to home or other facility with appropriate resources  Description: INTERVENTIONS:  - Identify barriers to discharge w/patient and caregiver  - Arrange for needed discharge resources and transportation as appropriate  - Identify discharge learning needs (meds, wound care, etc )  - Arrange for interpretive services to assist at discharge as needed  - Refer to Case Management Department for coordinating discharge planning if the patient needs post-hospital services based on physician/advanced practitioner order or complex needs related to functional status, cognitive ability, or social support system  1/14/2022 1513 by Zainab Proctor RN  Outcome: Adequate for Discharge  1/14/2022 0734 by Zainab Proctor RN  Outcome: Progressing     Problem: Knowledge Deficit  Goal: Patient/family/caregiver demonstrates understanding of disease process, treatment plan, medications, and discharge instructions  Description: Complete learning assessment and assess knowledge base    Interventions:  - Provide teaching at level of understanding  - Provide teaching via preferred learning methods  1/14/2022 1513 by Zainab Proctor RN  Outcome: Adequate for Discharge  1/14/2022 0734 by Zainab Proctor RN  Outcome: Progressing

## 2022-01-14 NOTE — ASSESSMENT & PLAN NOTE
· Venous leg ulcer of right lower extremity  · She had significant pain and right lower ext and right lower leg venous ulcer     · Follows with Dr Maria Esther Toscano outpatient   · Vascular surgery -- no inpatient recs, will need outpt f/u

## 2022-01-16 LAB
BACTERIA BLD CULT: NORMAL
BACTERIA BLD CULT: NORMAL

## 2022-01-24 ENCOUNTER — APPOINTMENT (EMERGENCY)
Dept: RADIOLOGY | Facility: HOSPITAL | Age: 62
DRG: 301 | End: 2022-01-24
Payer: COMMERCIAL

## 2022-01-24 ENCOUNTER — HOSPITAL ENCOUNTER (INPATIENT)
Facility: HOSPITAL | Age: 62
LOS: 2 days | Discharge: HOME | DRG: 301 | End: 2022-01-26
Attending: EMERGENCY MEDICINE | Admitting: INTERNAL MEDICINE
Payer: COMMERCIAL

## 2022-01-24 ENCOUNTER — TELEPHONE (OUTPATIENT)
Dept: VASCULAR SURGERY | Facility: CLINIC | Age: 62
End: 2022-01-24

## 2022-01-24 ENCOUNTER — APPOINTMENT (EMERGENCY)
Dept: NON INVASIVE DIAGNOSTICS | Facility: HOSPITAL | Age: 62
DRG: 301 | End: 2022-01-24
Payer: COMMERCIAL

## 2022-01-24 ENCOUNTER — TELEPHONE (OUTPATIENT)
Dept: INFECTIOUS DISEASES | Facility: CLINIC | Age: 62
End: 2022-01-24

## 2022-01-24 DIAGNOSIS — L97.919 VENOUS ULCER OF RIGHT LOWER EXTREMITY WITH VARICOSE VEINS (HCC): ICD-10-CM

## 2022-01-24 DIAGNOSIS — L03.115 CELLULITIS OF RIGHT ANTERIOR LOWER LEG: Primary | ICD-10-CM

## 2022-01-24 DIAGNOSIS — I83.019 VENOUS ULCER OF RIGHT LOWER EXTREMITY WITH VARICOSE VEINS (HCC): ICD-10-CM

## 2022-01-24 LAB
ALBUMIN SERPL BCP-MCNC: 3.6 G/DL (ref 3.5–5)
ALP SERPL-CCNC: 151 U/L (ref 46–116)
ALT SERPL W P-5'-P-CCNC: 115 U/L (ref 12–78)
ANION GAP SERPL CALCULATED.3IONS-SCNC: 5 MMOL/L (ref 4–13)
AST SERPL W P-5'-P-CCNC: 85 U/L (ref 5–45)
BASOPHILS # BLD AUTO: 0.02 THOUSANDS/ΜL (ref 0–0.1)
BASOPHILS NFR BLD AUTO: 0 % (ref 0–1)
BILIRUB SERPL-MCNC: 0.58 MG/DL (ref 0.2–1)
BUN SERPL-MCNC: 15 MG/DL (ref 5–25)
CALCIUM SERPL-MCNC: 9.1 MG/DL (ref 8.3–10.1)
CHLORIDE SERPL-SCNC: 107 MMOL/L (ref 100–108)
CO2 SERPL-SCNC: 26 MMOL/L (ref 21–32)
CREAT SERPL-MCNC: 0.72 MG/DL (ref 0.6–1.3)
EOSINOPHIL # BLD AUTO: 0.17 THOUSAND/ΜL (ref 0–0.61)
EOSINOPHIL NFR BLD AUTO: 2 % (ref 0–6)
ERYTHROCYTE [DISTWIDTH] IN BLOOD BY AUTOMATED COUNT: 13.3 % (ref 11.6–15.1)
GFR SERPL CREATININE-BSD FRML MDRD: 90 ML/MIN/1.73SQ M
GLUCOSE SERPL-MCNC: 98 MG/DL (ref 65–140)
HCT VFR BLD AUTO: 39.5 % (ref 34.8–46.1)
HGB BLD-MCNC: 12.9 G/DL (ref 11.5–15.4)
IMM GRANULOCYTES # BLD AUTO: 0.01 THOUSAND/UL (ref 0–0.2)
IMM GRANULOCYTES NFR BLD AUTO: 0 % (ref 0–2)
LYMPHOCYTES # BLD AUTO: 2.38 THOUSANDS/ΜL (ref 0.6–4.47)
LYMPHOCYTES NFR BLD AUTO: 28 % (ref 14–44)
MCH RBC QN AUTO: 29.6 PG (ref 26.8–34.3)
MCHC RBC AUTO-ENTMCNC: 32.7 G/DL (ref 31.4–37.4)
MCV RBC AUTO: 91 FL (ref 82–98)
MONOCYTES # BLD AUTO: 0.94 THOUSAND/ΜL (ref 0.17–1.22)
MONOCYTES NFR BLD AUTO: 11 % (ref 4–12)
NEUTROPHILS # BLD AUTO: 5.07 THOUSANDS/ΜL (ref 1.85–7.62)
NEUTS SEG NFR BLD AUTO: 59 % (ref 43–75)
NRBC BLD AUTO-RTO: 0 /100 WBCS
PLATELET # BLD AUTO: 312 THOUSANDS/UL (ref 149–390)
PMV BLD AUTO: 9.2 FL (ref 8.9–12.7)
POTASSIUM SERPL-SCNC: 3.5 MMOL/L (ref 3.5–5.3)
PROT SERPL-MCNC: 8.2 G/DL (ref 6.4–8.2)
RBC # BLD AUTO: 4.36 MILLION/UL (ref 3.81–5.12)
SODIUM SERPL-SCNC: 138 MMOL/L (ref 136–145)
WBC # BLD AUTO: 8.59 THOUSAND/UL (ref 4.31–10.16)

## 2022-01-24 PROCEDURE — 93971 EXTREMITY STUDY: CPT

## 2022-01-24 PROCEDURE — 73701 CT LOWER EXTREMITY W/DYE: CPT

## 2022-01-24 PROCEDURE — 80053 COMPREHEN METABOLIC PANEL: CPT | Performed by: EMERGENCY MEDICINE

## 2022-01-24 PROCEDURE — 99285 EMERGENCY DEPT VISIT HI MDM: CPT

## 2022-01-24 PROCEDURE — 99223 1ST HOSP IP/OBS HIGH 75: CPT | Performed by: INTERNAL MEDICINE

## 2022-01-24 PROCEDURE — 85025 COMPLETE CBC W/AUTO DIFF WBC: CPT | Performed by: EMERGENCY MEDICINE

## 2022-01-24 PROCEDURE — 93005 ELECTROCARDIOGRAM TRACING: CPT

## 2022-01-24 PROCEDURE — 36415 COLL VENOUS BLD VENIPUNCTURE: CPT | Performed by: EMERGENCY MEDICINE

## 2022-01-24 PROCEDURE — 99285 EMERGENCY DEPT VISIT HI MDM: CPT | Performed by: EMERGENCY MEDICINE

## 2022-01-24 PROCEDURE — 73590 X-RAY EXAM OF LOWER LEG: CPT

## 2022-01-24 RX ORDER — DOCUSATE SODIUM 100 MG/1
100 CAPSULE, LIQUID FILLED ORAL 2 TIMES DAILY PRN
Status: DISCONTINUED | OUTPATIENT
Start: 2022-01-24 | End: 2022-01-26 | Stop reason: HOSPADM

## 2022-01-24 RX ORDER — ACETAMINOPHEN 325 MG/1
975 TABLET ORAL EVERY 8 HOURS SCHEDULED
Status: DISCONTINUED | OUTPATIENT
Start: 2022-01-24 | End: 2022-01-26 | Stop reason: HOSPADM

## 2022-01-24 RX ORDER — ONDANSETRON 2 MG/ML
4 INJECTION INTRAMUSCULAR; INTRAVENOUS EVERY 6 HOURS PRN
Status: DISCONTINUED | OUTPATIENT
Start: 2022-01-24 | End: 2022-01-26 | Stop reason: HOSPADM

## 2022-01-24 RX ORDER — MAGNESIUM HYDROXIDE/ALUMINUM HYDROXICE/SIMETHICONE 120; 1200; 1200 MG/30ML; MG/30ML; MG/30ML
30 SUSPENSION ORAL EVERY 6 HOURS PRN
Status: DISCONTINUED | OUTPATIENT
Start: 2022-01-24 | End: 2022-01-26 | Stop reason: HOSPADM

## 2022-01-24 RX ORDER — CEFAZOLIN SODIUM 2 G/50ML
2000 SOLUTION INTRAVENOUS EVERY 8 HOURS
Status: DISCONTINUED | OUTPATIENT
Start: 2022-01-24 | End: 2022-01-26

## 2022-01-24 RX ORDER — OXYCODONE HYDROCHLORIDE 5 MG/1
5 TABLET ORAL EVERY 6 HOURS PRN
Status: DISCONTINUED | OUTPATIENT
Start: 2022-01-24 | End: 2022-01-26 | Stop reason: HOSPADM

## 2022-01-24 RX ORDER — KETOROLAC TROMETHAMINE 30 MG/ML
15 INJECTION, SOLUTION INTRAMUSCULAR; INTRAVENOUS ONCE
Status: COMPLETED | OUTPATIENT
Start: 2022-01-24 | End: 2022-01-24

## 2022-01-24 RX ADMIN — ACETAMINOPHEN 975 MG: 325 TABLET, FILM COATED ORAL at 23:10

## 2022-01-24 RX ADMIN — IOHEXOL 100 ML: 350 INJECTION, SOLUTION INTRAVENOUS at 22:01

## 2022-01-24 RX ADMIN — KETOROLAC TROMETHAMINE 15 MG: 30 INJECTION, SOLUTION INTRAMUSCULAR at 22:16

## 2022-01-24 RX ADMIN — CEFAZOLIN SODIUM 2000 MG: 2 SOLUTION INTRAVENOUS at 23:10

## 2022-01-24 NOTE — TELEPHONE ENCOUNTER
Pt seen by Dr Zena Young in the office on 1/11 for R leg cellulitis and ulcer  Pt was sent to [facility] ED at that time for fever and infected wound  Pt was given IV antibiotics in the hospital and transitioned to keflex  Pt's daughter, lEaina Faust, called the office this morning w/ concern re: new area in R medial thigh that is red, swollen, tender to touch, and warm to touch  Per Alma, this started approximately 1/19  Pt has 3 days of keflex left and OV scheduled for 2/7 w/ Dr Zena Young  Informed Alma that I would send a message to our triage provider for and further recommendations and we will call her back at [phone] opt 2

## 2022-01-24 NOTE — TELEPHONE ENCOUNTER
Chart reviewed  Patient has a complicated history with antibiotic resistance  Recommend that patient contact infectious disease as they were consulted during her inpatient stay and prescribed her current antibiotic regimen  She should get further recommendations from them regarding antibiotics  Recommend that she keep her scheduled appointment with Dr Justin Villalobos for 2/7

## 2022-01-24 NOTE — TELEPHONE ENCOUNTER
Pt daughter called in regarding her mother is not improving while on the Keflex  The cellulitis is  Red, warm to touch  Discussed with Dr Taisha Hassan     Per Dr Taisha Hassan her abx course was culture guided  Pt was improving on IV cefazolin while in the hospital  If she is worsening on PO Keflex, she will need readmission to restart IV cefazolin  Informed daughter of above  She stated that she will bring her mother to the ED for readmission to start therapy

## 2022-01-25 LAB
ALBUMIN SERPL BCP-MCNC: 3 G/DL (ref 3.5–5)
ALP SERPL-CCNC: 133 U/L (ref 46–116)
ALT SERPL W P-5'-P-CCNC: 91 U/L (ref 12–78)
ANION GAP SERPL CALCULATED.3IONS-SCNC: 5 MMOL/L (ref 4–13)
AST SERPL W P-5'-P-CCNC: 59 U/L (ref 5–45)
ATRIAL RATE: 74 BPM
BASOPHILS # BLD AUTO: 0.04 THOUSANDS/ΜL (ref 0–0.1)
BASOPHILS NFR BLD AUTO: 1 % (ref 0–1)
BILIRUB SERPL-MCNC: 1.08 MG/DL (ref 0.2–1)
BUN SERPL-MCNC: 11 MG/DL (ref 5–25)
CALCIUM ALBUM COR SERPL-MCNC: 9.6 MG/DL (ref 8.3–10.1)
CALCIUM SERPL-MCNC: 8.8 MG/DL (ref 8.3–10.1)
CHLORIDE SERPL-SCNC: 110 MMOL/L (ref 100–108)
CO2 SERPL-SCNC: 27 MMOL/L (ref 21–32)
CREAT SERPL-MCNC: 0.65 MG/DL (ref 0.6–1.3)
EOSINOPHIL # BLD AUTO: 0.18 THOUSAND/ΜL (ref 0–0.61)
EOSINOPHIL NFR BLD AUTO: 3 % (ref 0–6)
ERYTHROCYTE [DISTWIDTH] IN BLOOD BY AUTOMATED COUNT: 13.2 % (ref 11.6–15.1)
GFR SERPL CREATININE-BSD FRML MDRD: 96 ML/MIN/1.73SQ M
GLUCOSE SERPL-MCNC: 98 MG/DL (ref 65–140)
HCT VFR BLD AUTO: 37 % (ref 34.8–46.1)
HGB BLD-MCNC: 12.1 G/DL (ref 11.5–15.4)
IMM GRANULOCYTES # BLD AUTO: 0.01 THOUSAND/UL (ref 0–0.2)
IMM GRANULOCYTES NFR BLD AUTO: 0 % (ref 0–2)
LYMPHOCYTES # BLD AUTO: 1.64 THOUSANDS/ΜL (ref 0.6–4.47)
LYMPHOCYTES NFR BLD AUTO: 24 % (ref 14–44)
MAGNESIUM SERPL-MCNC: 2.3 MG/DL (ref 1.6–2.6)
MCH RBC QN AUTO: 29.8 PG (ref 26.8–34.3)
MCHC RBC AUTO-ENTMCNC: 32.7 G/DL (ref 31.4–37.4)
MCV RBC AUTO: 91 FL (ref 82–98)
MONOCYTES # BLD AUTO: 0.79 THOUSAND/ΜL (ref 0.17–1.22)
MONOCYTES NFR BLD AUTO: 12 % (ref 4–12)
NEUTROPHILS # BLD AUTO: 4.19 THOUSANDS/ΜL (ref 1.85–7.62)
NEUTS SEG NFR BLD AUTO: 60 % (ref 43–75)
NRBC BLD AUTO-RTO: 0 /100 WBCS
P AXIS: 59 DEGREES
PLATELET # BLD AUTO: 288 THOUSANDS/UL (ref 149–390)
PMV BLD AUTO: 9.3 FL (ref 8.9–12.7)
POTASSIUM SERPL-SCNC: 3.6 MMOL/L (ref 3.5–5.3)
PR INTERVAL: 182 MS
PROT SERPL-MCNC: 7.2 G/DL (ref 6.4–8.2)
QRS AXIS: 18 DEGREES
QRSD INTERVAL: 80 MS
QT INTERVAL: 376 MS
QTC INTERVAL: 417 MS
RBC # BLD AUTO: 4.06 MILLION/UL (ref 3.81–5.12)
SODIUM SERPL-SCNC: 142 MMOL/L (ref 136–145)
T WAVE AXIS: 83 DEGREES
VENTRICULAR RATE: 74 BPM
WBC # BLD AUTO: 6.85 THOUSAND/UL (ref 4.31–10.16)

## 2022-01-25 PROCEDURE — 93971 EXTREMITY STUDY: CPT | Performed by: SURGERY

## 2022-01-25 PROCEDURE — 99232 SBSQ HOSP IP/OBS MODERATE 35: CPT | Performed by: PHYSICIAN ASSISTANT

## 2022-01-25 PROCEDURE — 99255 IP/OBS CONSLTJ NEW/EST HI 80: CPT | Performed by: INTERNAL MEDICINE

## 2022-01-25 PROCEDURE — 36415 COLL VENOUS BLD VENIPUNCTURE: CPT | Performed by: INTERNAL MEDICINE

## 2022-01-25 PROCEDURE — 80053 COMPREHEN METABOLIC PANEL: CPT | Performed by: INTERNAL MEDICINE

## 2022-01-25 PROCEDURE — 93010 ELECTROCARDIOGRAM REPORT: CPT | Performed by: INTERNAL MEDICINE

## 2022-01-25 PROCEDURE — 85025 COMPLETE CBC W/AUTO DIFF WBC: CPT | Performed by: INTERNAL MEDICINE

## 2022-01-25 PROCEDURE — 87493 C DIFF AMPLIFIED PROBE: CPT | Performed by: PHYSICIAN ASSISTANT

## 2022-01-25 PROCEDURE — 83735 ASSAY OF MAGNESIUM: CPT | Performed by: INTERNAL MEDICINE

## 2022-01-25 RX ORDER — SACCHAROMYCES BOULARDII 250 MG
250 CAPSULE ORAL 2 TIMES DAILY
Status: DISCONTINUED | OUTPATIENT
Start: 2022-01-25 | End: 2022-01-26 | Stop reason: HOSPADM

## 2022-01-25 RX ADMIN — ACETAMINOPHEN 975 MG: 325 TABLET, FILM COATED ORAL at 21:35

## 2022-01-25 RX ADMIN — Medication 250 MG: at 17:09

## 2022-01-25 RX ADMIN — ENOXAPARIN SODIUM 40 MG: 40 INJECTION SUBCUTANEOUS at 10:50

## 2022-01-25 RX ADMIN — OXYCODONE HYDROCHLORIDE 5 MG: 5 TABLET ORAL at 11:08

## 2022-01-25 RX ADMIN — CEFAZOLIN SODIUM 2000 MG: 2 SOLUTION INTRAVENOUS at 14:01

## 2022-01-25 RX ADMIN — CEFAZOLIN SODIUM 2000 MG: 2 SOLUTION INTRAVENOUS at 06:39

## 2022-01-25 RX ADMIN — CEFAZOLIN SODIUM 2000 MG: 2 SOLUTION INTRAVENOUS at 21:35

## 2022-01-25 RX ADMIN — ACETAMINOPHEN 975 MG: 325 TABLET, FILM COATED ORAL at 06:39

## 2022-01-25 RX ADMIN — ACETAMINOPHEN 975 MG: 325 TABLET, FILM COATED ORAL at 14:01

## 2022-01-25 RX ADMIN — OXYCODONE HYDROCHLORIDE 5 MG: 5 TABLET ORAL at 20:45

## 2022-01-25 NOTE — H&P
[facility]  H&P- Deirdre Hedrick 1960, 64 y o  female MRN: [id number]  Unit/Bed#: QCF Encounter: [id number]  Primary Care Provider: Audrey Dandy   Date and time admitted to hospital: 1/24/2022  8:28 PM    * Cellulitis of right anterior lower leg  Assessment & Plan  Was on Keflex; will change to Ancef once more  Infectious disease consult  Imaging studies CT shows thrombophlebitis with no abscess formation  Doppler - no DVT  Pain management  Heat to area  Venous ulcer of right lower extremity with varicose veins (HCC)  Assessment & Plan  See plans regarding cellulitis  VTE Prophylaxis: Enoxaparin (Lovenox)  / sequential compression device   Code Status: Level 1 - Full Code full Code as discussed  POLST: There is no POLST form on file for this patient (pre-hospital)    Anticipated Length of Stay:  Patient will be admitted on an Inpatient basis with an anticipated length of stay of  greater than 2 midnights  Justification for Hospital Stay: Please see detailed plans noted above  Chief Complaint:     Right leg ulceration with note of a lump at right medial thigh  History of Present Illness:  Deirdre Hedrick is a 64 y o  female who has past medical history significant for recent admission for cellulitis of the right leg with ulcer and varicose veins of that extremity  She was then treated initially in [place] [address] with Bactrim, Zosyn and vancomycin and discharged home on doxycycline and later Augmentin which did not improve  This has led the patient to be admitted to the recent admission in [facility] treated with IV Ancef and transition to Keflex to complete for 14 days  Noted at that time wound grew MSSA and group C strep  There was no abscess on MRI of December 26   Patient claims that she was doing quite well until approximately 6 days ago when she started seeing increasing redness and pain and having developed a lump at the medial aspect of the right thigh which is the same extremity where the ulceration is located at  Patient states that with the increasing pain, she then came to the emergency room to be evaluated  She however denies any fever or chills  Patient denies having any recent trauma of the right lower extremity  Review of Systems:    Constitutional:  Denies fever or chills   Eyes:  Denies change in visual acuity   HENT:  Denies nasal congestion or sore throat   Respiratory:  Denies cough or shortness of breath   Cardiovascular:  Denies chest pain or edema   GI:  Denies abdominal pain, nausea, vomiting, bloody stools or diarrhea   :  Denies dysuria   Musculoskeletal:  Denies back pain or joint pain   Integument:  Ulceration of the right lower leg anterior with no note of new discharge  With note of a lump at the medial aspect of the right thigh which is the same extremity involved  Neurologic:  Denies headache, focal weakness or sensory changes   Endocrine:  Denies polyuria or polydipsia   Lymphatic:  Denies swollen glands   Psychiatric:  Denies depression or anxiety     Past Medical and Surgical History:   Past Medical History:   Diagnosis Date    Hypertension     Ulcer of right shin ([address] )      History reviewed  No pertinent surgical history  Meds/Allergies:  (Not in a hospital admission)      Allergies:    Allergies   Allergen Reactions    Wound Dressing Adhesive Rash     History:  Marital Status: /Civil Union   Occupation:  [email or web address]  Patient Pre-hospital Living Situation:  Lives at home  Patient Pre-hospital Level of Mobility:  Ambulatory  Patient Pre-hospital Diet Restrictions:  Regular  Substance Use History:   Social History     Substance and Sexual Activity   Alcohol Use Not Currently     Social History     Tobacco Use   Smoking Status Never Smoker   Smokeless Tobacco Never Used     Social History     Substance and Sexual Activity   Drug Use Never       Family History:  Family History   Problem Relation Age of Onset    No Known Problems Mother     No Known Problems Father     No Known Problems Sister     No Known Problems Brother     No Known Problems Son     No Known Problems Daughter     No Known Problems Maternal Grandmother     No Known Problems Maternal Grandfather     No Known Problems Paternal Grandmother     No Known Problems Paternal Grandfather     No Known Problems Maternal Aunt     No Known Problems Maternal Uncle     No Known Problems Paternal Aunt     No Known Problems Paternal Uncle     No Known Problems Cousin        Physical Exam:     Vitals:   Blood Pressure: (!) 172/80 (01/24/22 2218)  Pulse: 87 (01/24/22 2218)  Temperature: 98 °F (36 7 °C) (01/24/22 1840)  Respirations: 20 (01/24/22 2218)  SpO2: 99 % (01/24/22 2218)    Constitutional:  Well developed, well nourished, no acute distress, non-toxic appearance   Eyes:  PERRL, conjunctiva normal   HENT:  Atraumatic, external ears normal, nose normal, oropharynx moist, no pharyngeal exudates  Neck- normal range of motion, no tenderness, supple   Respiratory:  No respiratory distress, normal breath sounds, no rales, no wheezing   Cardiovascular:  Normal rate, normal rhythm, no murmurs, no gallops, no rubs   GI:  Soft, nondistended, normal bowel sounds, nontender, no organomegaly, no mass, no rebound, no guarding   :  No costovertebral angle tenderness   Musculoskeletal:  No edema, tender right lower extremity when pressed at the location of the wound with note of distinct border ulceration exposed to layer of fat  Not warm  Violaceous discoloration of the periphery  Also an irregular distinct elongated elevated lesion located at right medial thigh which is nontender to touch  No redness    Back- no tenderness  Integument:  Well hydrated, no rash   Lymphatic:  No lymphadenopathy noted   Neurologic:  Alert &awake, communicative, CN 2-12 normal, normal motor function, normal sensory function, no focal deficits noted   Psychiatric:  Speech and behavior appropriate       Lab Results: I have personally reviewed pertinent reports  Results from last 7 days   Lab Units 01/24/22 2103   WBC Thousand/uL 8 59   HEMOGLOBIN g/dL 12 9   HEMATOCRIT % 39 5   PLATELETS Thousands/uL 312   NEUTROS PCT % 59   LYMPHS PCT % 28   MONOS PCT % 11   EOS PCT % 2     Results from last 7 days   Lab Units 01/24/22 2103   POTASSIUM mmol/L 3 5   CHLORIDE mmol/L 107   CO2 mmol/L 26   BUN mg/dL 15   CREATININE mg/dL 0 72   CALCIUM mg/dL 9 1   ALK PHOS U/L 151*   ALT U/L 115*   AST U/L 85*               Imaging: I have personally reviewed pertinent reports  XR tibia fibula 2 views RIGHT    Result Date: 1/12/2022  Narrative: RIGHT TIBIA AND FIBULA INDICATION:   Very tender ulcer, right distal leg  COMPARISON:  12/22/2021 VIEWS:  XR TIBIA FIBULA 2 VW RIGHT Images: 4 FINDINGS: There is no acute fracture or dislocation  Postoperative changes in the proximal tibia with a screw and pins  Severe degenerative changes in the knee  Moderate degenerative changes in the tibial talar joint  Calcaneal spurs are present  No lytic or blastic osseous lesion  Scattered soft tissue calcifications in the lower extremity  No obvious collections of air  Impression: No acute osseous abnormality  Degenerative changes  Postoperative changes  Workstation performed: [id number]     MRI tibia fibula right w wo contrast    Result Date: 12/27/2021  Narrative: MRI RIGHT LOWER EXTREMITY WITH AND WITHOUT CONTRAST - (TIBIA-FIBULA) INDICATION:   RLE wound on leg w and w/o maris, eval for osteo  COMPARISON:  Right tibia-fibula radiographs 12/22/2021 TECHNIQUE:  MR examination of the right tibia-fibula was performed with and without contrast  Precontrast pulse sequences:Axial T1, T2 fat-sat, T1 fat-sat; coronal T1 and STIR; sagittal T2 fat-sat  Postcontrast pulse sequences: Axial and sagittal T1 fat-sat    Axial postcontrast subtraction images were also created by the technologist and utilized in interpretation  (Please note the coronal images also include the contralateral lower extremity ) IV Contrast:  9 mL of Gadobutrol injection (SINGLE-DOSE) FINDINGS: SUBCUTANEOUS TISSUES:  There are varicose veins in subcutaneous edema and enhancement in the anteromedial lower leg  No focal fluid collection  BONES:  Susceptibility artifact from a plate and screw in the proximal tibia  Otherwise normal marrow signal and enhancement  ARTICULAR SURFACES:  Normal  VISUALIZED MUSCULATURE:  Unremarkable  OTHER SOFT TISSUES:  Unremarkable  OTHER PERTINENT FINDINGS:  None  PARTIALLY IMAGED CONTRALATERAL LOWER EXTREMITY:  There are no abnormalities in the partially imaged contralateral lower extremity  Impression: No osteomyelitis or drainable abscess  Workstation performed: [id number]     VAS lower limb arterial duplex, limited/unilateral    Result Date: 12/31/2021  Narrative:  [facility] REPORT [facility]INICAL: Indications:  Patient presents with an ulcer on her shin which started over a month ago  Patient denies any pain with walking  Risk Factors The patient has history of DVT  Clinical Right Pressure:  157/ mm Hg, Left Pressure:  168/ mm Hg  FINDINGS:  Right                  PSV (cm/s)  EDV  Common Femoral Artery         115    8  Prox Profunda                  95    2  Prox SFA                      105    0  Mid SFA                       111    9  Dist SFA                      105   11  Proximal Pop                   56    0  Distal Pop                     66   11  Dist Post Tibial              110    0  Dist  Ant  Tibial              76    3  Dist Peroneal                  84    0     CONCLUSION:  Impression: RIGHT LOWER LIMB: Diffuse disease noted throughout the femoral-popliteal arteries without significant focal stenosis  Ankle/Brachial index: 1 43 with in supra normal limits  Metatarsal pressure of 188 mmHg Great toe pressure of 146 mmHg, within the healing range   PVR/ PPG tracings are normal   LEFT LOWER LIMB: Ankle/Brachial index: 1 3 with in normal limits  Metatarsal pressure of 173 mmHg Great toe pressure of 123 mmHg, within the healing range  PVR/ PPG tracings are normal   SIGNATURE: Electronically Signed by: Mckinley Moreno on 2021-12-31 12:02:11 PM    VAS reflux lower limb venous duplex study with reflux assesment, unilateral    Result Date: 12/30/2021  Narrative:  [facility] REPORT CLINICAL: Indications: Chronic venous hypertension (idiopathic) with ulcer of right lower extremity [I87 [id number]]  Patient presents with Right lower extremity non-healing ulcer on her shin since early December  Risk Factors The patient has history of DVT  FINDINGS:  Right                         Diameter AP  Reflux Time  CFV                                               1 59  PFV                                               1 78  GSV Inguinal                         12 4         0 34  GSV Prox Thigh                        6 7         1 87  GSV Mid Thigh                         8 2         2 17  GSV Dist Thigh                        7 4         2 81  GSV Knee                              8 6         3 10  GSV Prox Calf                         2 9         2 91  GSV Mid Calf                          3 4               GSV Dist Calf                         5 8               SSV Mid Calf                          4 3         1 10  SSV Knee                              3 0         0 86  SSV Ankle                             3 8         0 90  Popliteal                                         4 06  Cockett's perforators (1,23)          4 7         0 86     CONCLUSION:  Impression: RIGHT LIMB: Deep venous incompetence is noted in the mid femoral vein through out the popliteal vein  The great saphenous vein is incompetent  The great saphenous vein does not remain within the saphenous compartment in the thigh  The small saphenous vein is incompetent and communicates with the popliteal vein   There is evidence of incompetent perforators in the mid calf  There is no evidence of deep vein thrombosis in the CFV, the proximal PFV, the femoral vein and the popliteal vein  Study performed with patient in steep Reverse Trendelenburg  SIGNATURE: Electronically Signed by: Alka Barrera on 2021-12-30 03:17:31 PM    US bedside procedure    Result Date: 1/24/2022  Narrative: 1 2 840 [id number]  2 446 [id number]        ** Please Note: Dragon 360 Dictation voice to text software was used in the creation of this document   **

## 2022-01-25 NOTE — CONSULTS
Consultation - Infectious Disease   Deirdre Hedrick 64 y o  female MRN: [id number]  Unit/Bed#: [facility] 320-01 Encounter: [id number]      IMPRESSION & RECOMMENDATIONS:    1  Right leg cellulitis  Source is most likely patient's chronic leg ulcer  Cellulitis appears superficial and localized  Patient has no leukocytosis, no fevers  Recent outpatient wound culture growing MSSA and group C strep  She was discharged on 01/14/22 on a 14 day course of oral Keflex  CT negative for abscess or fluid collection  X-ray negative for osteomyelitis or soft tissue gas  Continue IV cefazolin  Serial exams  Monitor temperature and white blood cell count  2  Right thigh thrombophlebitis  CT findings consistent with thrombophlebitis of varicose with right greater saphenous vein  Venous duplex negative for DVT, superficial thrombophlebitis noted at mid thigh  Area is non-tender without local signs of erythema, edema, or warmth  Encouraged elevation and heat application  Pain management per primary team    Serial exams  3  Chronic LE venous stasis with RLE ulceration  Manage outpatient by vascular surgery  Appears unchanged from last admission  Local wound care  Encouraged elevation  HISTORY OF PRESENT ILLNESS:  Reason for Consult:  Right lower extremity cellulitis  HPI: Deirdre Hedrick is a 64y o  year old female with past medical history of chronic venous stasis with right lower extremity cellulitis, hypertension  Patient was recently admitted earlier this month, January 2022, for right lower extremity cellulitis  Wound cultures from previous admission showed growth of MSSA and group C strep, she was treated during this admission on IV Ancef and noted to improve, she was then discharged on a 14 day course of oral Keflex  Early last week, the patient noted increased redness and pain to the periwound area    She also noticed a new lesion on her right thigh which became increasingly painful which was brought her to the ED for evaluation  Denies any recent trauma to the area  Denies any increase in drainage or purulence from her wound  Patient denies nausea, vomiting, chest pain, shortness of breath, chills, fever  REVIEW OF SYSTEMS:  A complete 12 point system-based review of systems is negative other than that noted in the HPI  PAST MEDICAL HISTORY:  Past Medical History:   Diagnosis Date    Hypertension     Ulcer of right shin ([address] )      History reviewed  No pertinent surgical history  FAMILY HISTORY:  Non-contributory    SOCIAL HISTORY:  Social History   Social History     Substance and Sexual Activity   Alcohol Use Not Currently     Social History     Substance and Sexual Activity   Drug Use Never     Social History     Tobacco Use   Smoking Status Never Smoker   Smokeless Tobacco Never Used       ALLERGIES:  Allergies   Allergen Reactions    Wound Dressing Adhesive Rash       MEDICATIONS:  All current active medications have been reviewed      PHYSICAL EXAM:  Temp:  [98 °F (36 7 °C)] 98 °F (36 7 °C)  HR:  [60-87] 64  Resp:  [18-20] 18  BP: (120-172)/(55-96) 141/76  SpO2:  [95 %-99 %] 98 %  Temp (24hrs), Av °F (36 7 °C), Min:98 °F (36 7 °C), Max:98 °F (36 7 °C)  Current: Temperature: 98 °F (36 7 °C)    Intake/Output Summary (Last 24 hours) at 2022 0918  Last data filed at 2022 6226  Gross per 24 hour   Intake 100 ml   Output --   Net 100 ml       General Appearance:  Appearing well, nontoxic, and in no distress   Head:  Normocephalic, without obvious abnormality, atraumatic   Eyes:  Conjunctiva pink and sclera anicteric, both eyes   Nose: Nares normal, mucosa normal, no drainage   Throat: Oropharynx moist without lesions   Neck: Supple, symmetrical, no adenopathy, no tenderness/mass/nodules   Back:   Symmetric, no curvature, ROM normal, no CVA tenderness   Lungs:   Clear to auscultation bilaterally, respirations unlabored   Chest Wall:  No tenderness or deformity   Heart:  RRR; no murmur, rub or gallop   Abdomen:   Soft, non-tender, non-distended, positive bowel sounds    Extremities: No cyanosis, clubbing or edema   Skin: Right anterior shin wound with localized surrounding erythema and edema, without any signs of purulence or fluctuance  Wound base is noted to be red with a violaceous border  Chronic venous stasis changes noted to the bilateral lower extremity  Raised lesion noted to the right medial thigh without surrounding redness, tenderness, warmth  Lymph nodes: Cervical, supraclavicular nodes normal   Neurologic: Alert and oriented times 3, extremity strength 5/5 and symmetric       LABS, IMAGING, & OTHER STUDIES:  Lab Results:  I have personally reviewed pertinent labs  Results from last 7 days   Lab Units 01/25/22  0637 01/24/22  2103   WBC Thousand/uL 6 85 8 59   HEMOGLOBIN g/dL 12 1 12 9   PLATELETS Thousands/uL 288 312     Results from last 7 days   Lab Units 01/25/22  0637 01/24/22  2103 01/24/22  2103   POTASSIUM mmol/L 3 6   < > 3 5   CHLORIDE mmol/L 110*   < > 107   CO2 mmol/L 27   < > 26   BUN mg/dL 11   < > 15   CREATININE mg/dL 0 65   < > 0 72   EGFR ml/min/1 73sq m 96   < > 90   CALCIUM mg/dL 8 8   < > 9 1   AST U/L 59*  --  85*   ALT U/L 91*   < > 115*   ALK PHOS U/L 133*   < > 151*    < > = values in this interval not displayed  Imaging Studies:   I have personally reviewed pertinent imaging study reports and images in PACS  Other Studies:   I have personally reviewed pertinent reports

## 2022-01-25 NOTE — PLAN OF CARE
Problem: Potential for Falls  Goal: Patient will remain free of falls  Description: INTERVENTIONS:  - Educate patient/family on patient safety including physical limitations  - Instruct patient to call for assistance with activity   - Consult OT/PT to assist with strengthening/mobility   - Keep Call bell within reach  - Keep bed low and locked with side rails adjusted as appropriate  - Keep care items and personal belongings within reach  - Initiate and maintain comfort rounds  - Make Fall Risk Sign visible to staff  - Offer Toileting every 2 Hours, in advance of need  - Initiate/Maintain bed alarm  - Obtain necessary fall risk management equipment  - Apply yellow socks and bracelet for high fall risk patients  - Consider moving patient to room near nurses station  Outcome: Progressing     Problem: Nutrition/Hydration-ADULT  Goal: Nutrient/Hydration intake appropriate for improving, restoring or maintaining nutritional needs  Description: Monitor and assess patient's nutrition/hydration status for malnutrition  Collaborate with interdisciplinary team and initiate plan and interventions as ordered  Monitor patient's weight and dietary intake as ordered or per policy  Utilize nutrition screening tool and intervene as necessary  Determine patient's food preferences and provide high-protein, high-caloric foods as appropriate       INTERVENTIONS:  - Monitor oral intake, urinary output, labs, and treatment plans  - Assess nutrition and hydration status and recommend course of action  - Evaluate amount of meals eaten  - Assist patient with eating if necessary   - Allow adequate time for meals  - Recommend/ encourage appropriate diets, oral nutritional supplements, and vitamin/mineral supplements  - Order, calculate, and assess calorie counts as needed  - Recommend, monitor, and adjust tube feedings and TPN/PPN based on assessed needs  - Assess need for intravenous fluids  - Provide specific nutrition/hydration education as appropriate  - Include patient/family/caregiver in decisions related to nutrition  Outcome: Progressing     Problem: PAIN - ADULT  Goal: Verbalizes/displays adequate comfort level or baseline comfort level  Description: Interventions:  - Encourage patient to monitor pain and request assistance  - Assess pain using appropriate pain scale  - Administer analgesics based on type and severity of pain and evaluate response  - Implement non-pharmacological measures as appropriate and evaluate response  - Consider cultural and social influences on pain and pain management  - Notify physician/advanced practitioner if interventions unsuccessful or patient reports new pain  Outcome: Progressing     Problem: INFECTION - ADULT  Goal: Absence or prevention of progression during hospitalization  Description: INTERVENTIONS:  - Assess and monitor for signs and symptoms of infection  - Monitor lab/diagnostic results  - Monitor all insertion sites, i e  indwelling lines, tubes, and drains  - Monitor endotracheal if appropriate and nasal secretions for changes in amount and color  - [place] appropriate cooling/warming therapies per order  - Administer medications as ordered  - Instruct and encourage patient and family to use good hand hygiene technique  - Identify and instruct in appropriate isolation precautions for identified infection/condition  Outcome: Progressing  Goal: Absence of fever/infection during neutropenic period  Description: INTERVENTIONS:  - Monitor WBC    Outcome: Progressing     Problem: SAFETY ADULT  Goal: Patient will remain free of falls  Description: INTERVENTIONS:  - Educate patient/family on patient safety including physical limitations  - Instruct patient to call for assistance with activity   - Consult OT/PT to assist with strengthening/mobility   - Keep Call bell within reach  - Keep bed low and locked with side rails adjusted as appropriate  - Keep care items and personal belongings within reach  - Initiate and maintain comfort rounds  - Make Fall Risk Sign visible to staff  - Offer Toileting every 2 Hours, in advance of need  - Initiate/Maintain bed alarm  - Obtain necessary fall risk management equipment  - Apply yellow socks and bracelet for high fall risk patients  - Consider moving patient to room near nurses station  Outcome: Progressing  Goal: Maintain or return to baseline ADL function  Description: INTERVENTIONS:  -  Assess patient's ability to carry out ADLs; assess patient's baseline for ADL function and identify physical deficits which impact ability to perform ADLs (bathing, care of mouth/teeth, toileting, grooming, dressing, etc )  - Assess/evaluate cause of self-care deficits   - Assess range of motion  - Assess patient's mobility; develop plan if impaired  - Assess patient's need for assistive devices and provide as appropriate  - Encourage maximum independence but intervene and supervise when necessary  - Involve family in performance of ADLs  - Assess for home care needs following discharge   - Consider OT consult to assist with ADL evaluation and planning for discharge  - Provide patient education as appropriate  Outcome: Progressing  Goal: Maintains/Returns to pre admission functional level  Description: INTERVENTIONS:  - Perform BMAT or MOVE assessment daily    - Set and communicate daily mobility goal to care team and patient/family/caregiver  - Collaborate with rehabilitation services on mobility goals if consulted  - Perform Range of Motion 4 times a day  - Reposition patient every 2 hours    - Dangle patient 4 times a day  - Stand patient 3 times a day  - Ambulate patient 3 times a day  - Out of bed to chair 3 times a day   - Out of bed for meals 3 times a day  - Out of bed for toileting  - Record patient progress and toleration of activity level   Outcome: Progressing     Problem: DISCHARGE PLANNING  Goal: Discharge to home or other facility with appropriate resources  Description: INTERVENTIONS:  - Identify barriers to discharge w/patient and caregiver  - Arrange for needed discharge resources and transportation as appropriate  - Identify discharge learning needs (meds, wound care, etc )  - Arrange for interpretive services to assist at discharge as needed  - Refer to Case Management Department for coordinating discharge planning if the patient needs post-hospital services based on physician/advanced practitioner order or complex needs related to functional status, cognitive ability, or social support system  Outcome: Progressing     Problem: Knowledge Deficit  Goal: Patient/family/caregiver demonstrates understanding of disease process, treatment plan, medications, and discharge instructions  Description: Complete learning assessment and assess knowledge base    Interventions:  - Provide teaching at level of understanding  - Provide teaching via preferred learning methods  Outcome: Progressing

## 2022-01-25 NOTE — PROGRESS NOTES
[facility] [facility]  Progress Note Sarika Mcrae 1960, 64 y o  female MRN: [id number]  Unit/Bed#: [facility] 320-01 Encounter: [id number]  Primary Care Provider: Marissa Cochran   Date and time admitted to hospital: 2022  8:28 PM    * Cellulitis of right anterior lower leg  Assessment & Plan  · Recurrent admissions for recurrent cellulitis, has failed PO  · Responded to IV Ancef previously, continue IV Ancef at this time  · ID consult appreciated  Likely will transition to high dose PO Keflex on discharge as suspect previously inadequate levels were achieved with PO   · Serial exams     Venous ulcer of right lower extremity with varicose veins (HCC)  Assessment & Plan  See plans regarding cellulitis  VTE Pharmacologic Prophylaxis:   Moderate Risk (Score 3-4) - Pharmacological DVT Prophylaxis Ordered: enoxaparin (Lovenox)  Patient Centered Rounds: I performed bedside rounds with nursing staff today  Discussions with Specialists or Other Care Team Provider: RN     Education and Discussions with Family / Patient: Patient declined call to   Time Spent for Care: 20 minutes  More than 50% of total time spent on counseling and coordination of care as described above  Current Length of Stay: 1 day(s)  Current Patient Status: Inpatient   Certification Statement: The patient will continue to require additional inpatient hospital stay due to cellulitis failed PO antibiotics, needs IV abx presently  Discharge Plan: Anticipate discharge in 48 hrs to home  Code Status: Level 1 - Full Code    Subjective:   Pt has no acute complaints other than burning leg pain associated with cellulitis    Does get diarrhea/GI upset with abx     Objective:     Vitals:   Temp (24hrs), Av °F (36 7 °C), Min:98 °F (36 7 °C), Max:98 °F (36 7 °C)    Temp:  [98 °F (36 7 °C)] 98 °F (36 7 °C)  HR:  [60-87] 64  Resp:  [18-20] 18  BP: (120-172)/(55-96) 141/76  SpO2:  [95 %-99 %] 98 %  Body mass index is 36 58 kg/m²  Input and Output Summary (last 24 hours): Intake/Output Summary (Last 24 hours) at 1/25/2022 1355  Last data filed at 1/25/2022 0655  Gross per 24 hour   Intake 100 ml   Output --   Net 100 ml       Physical Exam:   Physical Exam  Vitals reviewed  Constitutional:       General: She is not in acute distress  Appearance: She is not toxic-appearing  HENT:      Head: Normocephalic and atraumatic  Cardiovascular:      Rate and Rhythm: Normal rate and regular rhythm  Pulmonary:      Effort: Pulmonary effort is normal  No respiratory distress  Breath sounds: Normal breath sounds  Abdominal:      General: Bowel sounds are normal  There is no distension  Palpations: Abdomen is soft  Tenderness: There is no abdominal tenderness  Musculoskeletal:         General: Normal range of motion  Cervical back: Normal range of motion  Comments: RLE ulceration with surrounding erythema    Neurological:      General: No focal deficit present  Mental Status: She is alert and oriented to person, place, and time  Psychiatric:         Mood and Affect: Mood normal          Behavior: Behavior normal          Thought Content:  Thought content normal           Additional Data:     Labs:  Results from last 7 days   Lab Units 01/25/22  0637   WBC Thousand/uL 6 85   HEMOGLOBIN g/dL 12 1   HEMATOCRIT % 37 0   PLATELETS Thousands/uL 288   NEUTROS PCT % 60   LYMPHS PCT % 24   MONOS PCT % 12   EOS PCT % 3     Results from last 7 days   Lab Units 01/25/22  0637   SODIUM mmol/L 142   POTASSIUM mmol/L 3 6   CHLORIDE mmol/L 110*   CO2 mmol/L 27   BUN mg/dL 11   CREATININE mg/dL 0 65   ANION GAP mmol/L 5   CALCIUM mg/dL 8 8   ALBUMIN g/dL 3 0*   TOTAL BILIRUBIN mg/dL 1 08*   ALK PHOS U/L 133*   ALT U/L 91*   AST U/L 59*   GLUCOSE RANDOM mg/dL 98                       Lines/Drains:  Invasive Devices  Report    Peripheral Intravenous Line            Peripheral IV 01/24/22 Left Antecubital <1 day                      Imaging: No pertinent imaging reviewed  Recent Cultures (last 7 days):         Last 24 Hours Medication List:   Current Facility-Administered Medications   Medication Dose Route Frequency Provider Last Rate    acetaminophen  975 mg Oral [facility] Davin Woodruff MD      aluminum-magnesium hydroxide-simethicone  30 mL Oral Q6H PRN Davin Woodruff MD      cefazolin  2,000 mg Intravenous Q8H Davin Woodruff MD Stopped (01/25/22 0769)    docusate sodium  100 mg Oral BID PRN Davin Woodruff MD      enoxaparin  40 mg Subcutaneous Daily Davin Woodruff MD      ondansetron  4 mg Intravenous Q6H PRN Davin Woodruff MD     Brandon Abt oxyCODONE  5 mg Oral Q6H PRN Davin Woodruff MD          Today, Patient Was Seen By: Francine Downing PA-C    **Please Note: This note may have been constructed using a voice recognition system  **

## 2022-01-25 NOTE — UTILIZATION REVIEW
Initial Clinical Review    Admission: Date/Time/Statement:   Admission Orders (From admission, onward)     Ordered        01/24/22 2212  Inpatient Admission  Once                      Orders Placed This Encounter   Procedures    Inpatient Admission     Standing Status:   Standing     Number of Occurrences:   1     Order Specific Question:   Level of Care     Answer:   Med Surg [16]     Order Specific Question:   Estimated length of stay     Answer:   More than 2 Midnights     Order Specific Question:   Certification     Answer:   I certify that inpatient services are medically necessary for this patient for a duration of greater than two midnights  See H&P and MD Progress Notes for additional information about the patient's course of treatment  ED Arrival Information     Expected Arrival Acuity    - 1/24/2022 18:31 Urgent         Means of arrival Escorted by Service Admission type    Walk-In Family Member Hospitalist Urgent         Arrival complaint    Leg Infection         Chief Complaint   Patient presents with    Leg Pain     pt daughter states pt was sent in by Infectious disease due to R lower extremity cellulits       Initial Presentation:  64 y o  female with PMH of  recent admission for cellulitis of the right leg with ulcer and varicose veins  She was treated initially in [facility] with Bactrim, Zosyn and vancomycin and discharged home on doxycycline and later Augmentin, but did not improve  Patient was admitted at the [facility], treated with IV Ancef and transitioned to Keflex to complete for 14 days  Noted at that time wound grew MSSA and group C strep  There was no abscess on MRI of December 26  Patient claims that she was doing quite well until approximately six days ago when she began with increasing redness and pain,  and developed a lump at the medial aspect of the right thigh  Patient denies any recent trauma of the right lower extremity  PMH also includes HTN  Physical exam: tender right lower extremity when pressed at the location of the wound with note of distinct border ulceration exposed to layer of fat  Not warm  Violaceous discoloration of the periphery  Also an irregular distinct elongated elevated lesion located at right medial thigh which is nontender to touch  Plan: Inpatient admission for evaluation and treatment of RLE cellulitis:  IV Ancef, pain management, consult Infectious Disease  1/25 Infectious Disease consult: Right leg cellulitis  Patient's exam is equivocal for cellulitis, versus venous stasis/phlebitis  If patient does indeed have leg cellulitis, failure of Keflex would most likely be secondary to inability to obtain adequate antibiotic level via p o  route, given response to cefazolin during recent hospitalization  Continue IV cefazolin  Serial leg exams  Monitor temperature/WBC  Anticipate rapid transition to high-dose p o  Keflex  Physical exam:  1+ leg edema  Chronic right lower leg ulcer, without drainage  Mild to moderate surrounding erythema/warmth  Moderate tenderness  Superficial thrombophlebitis in right medial thigh          ED Triage Vitals   Temperature Pulse Respirations Blood Pressure SpO2   01/24/22 1840 01/24/22 1840 01/24/22 1840 01/24/22 1840 01/24/22 1840   98 °F (36 7 °C) 83 20 168/96 98 %      Temp Source Heart Rate Source Patient Position - Orthostatic VS BP Location FiO2 (%)   01/25/22 0809 01/24/22 1840 01/24/22 2218 01/24/22 2218 --   Oral Monitor Lying Right arm       Pain Score       01/24/22 1840       6          Wt Readings from Last 1 Encounters:   01/25/22 90 7 kg (200 lb)     Additional Vital Signs:       Date/Time Temp Pulse Resp BP MAP (mmHg) SpO2 O2 Device   01/25/22 0809 98 °F (36 7 °C) 64 18 141/76 99 98 % None (Room air)   01/25/22 0600 -- 60 18 141/70 100 95 % None (Room air)   01/25/22 0530 -- 60 18 130/68 92 95 % None (Room air)   01/25/22 0300 -- 66 20 120/55 78 95 % None (Room air) 01/24/22 2218 -- 87 20 172/80  115 99 % None (Room air)         Pertinent Labs/Diagnostic Test Results:     1/24 CT RLE: Findings consistent with thrombophlebitis of a markedly varicose right greater saphenous vein  No evidence of fluid collection to suggest abscess      1/24 venous duplex LE:     RIGHT LOWER LIMB  No evidence of acute or chronic deep vein thrombosis   Superficial thrombophlebitis noted in the varicose veins at the mid thigh to  the knee  Doppler evaluation shows a normal response to augmentation maneuvers  LEFT LOWER LIMB LIMITED  Evaluation shows no evidence of thrombus in the common femoral vein  Doppler evaluation shows a normal response to augmentation maneuvers  1/24 x-ray R tibia fibula:  No radiographic evidence of osteomyelitis      1/24 EKG:        Results from last 7 days   Lab Units 01/25/22  0637 01/24/22  2103   WBC Thousand/uL 6 85 8 59   HEMOGLOBIN g/dL 12 1 12 9   HEMATOCRIT % 37 0 39 5   PLATELETS Thousands/uL 288 312   NEUTROS ABS Thousands/µL 4 19 5 07         Results from last 7 days   Lab Units 01/25/22  0637 01/24/22  2103   SODIUM mmol/L 142 138   POTASSIUM mmol/L 3 6 3 5   CHLORIDE mmol/L 110* 107   CO2 mmol/L 27 26   ANION GAP mmol/L 5 5   BUN mg/dL 11 15   CREATININE mg/dL 0 65 0 72   EGFR ml/min/1 73sq m 96 90   CALCIUM mg/dL 8 8 9 1   MAGNESIUM mg/dL 2 3  --      Results from last 7 days   Lab Units 01/25/22  0637 01/24/22  2103   AST U/L 59* 85*   ALT U/L 91* 115*   ALK PHOS U/L 133* 151*   TOTAL PROTEIN g/dL 7 2 8 2   ALBUMIN g/dL 3 0* 3 6   TOTAL BILIRUBIN mg/dL 1 08* 0 58         Results from last 7 days   Lab Units 01/25/22  0637 01/24/22  2103   GLUCOSE RANDOM mg/dL 98 98                         ED Treatment:   Medication Administration from 01/24/2022 1831 to 01/25/2022 4450       Date/Time Order Dose Route Action     01/24/2022 2201 iohexol (OMNIPAQUE) 350 MG/ML injection (SINGLE-DOSE) 100 mL 100 mL Intravenous Given     01/25/2022 0639 acetaminophen (TYLENOL) tablet 975 mg 975 mg Oral Given     01/24/2022 2310 acetaminophen (TYLENOL) tablet 975 mg 975 mg Oral Given     01/25/2022 0655 ceFAZolin (ANCEF) IVPB (premix in dextrose) 2,000 mg 50 mL 0 mg Intravenous Stopped     01/25/2022 0639 ceFAZolin (ANCEF) IVPB (premix in dextrose) 2,000 mg 50 mL 2,000 mg Intravenous New Bag     01/25/2022 0008 ceFAZolin (ANCEF) IVPB (premix in dextrose) 2,000 mg 50 mL 0 mg Intravenous Stopped     01/24/2022 2310 ceFAZolin (ANCEF) IVPB (premix in dextrose) 2,000 mg 50 mL 2,000 mg Intravenous New Bag     01/24/2022 2216 ketorolac (TORADOL) injection 15 mg 15 mg Intravenous Given        Past Medical History:   Diagnosis Date    Hypertension     Ulcer of right shin ([address] )      Present on Admission:   Venous ulcer of right lower extremity with varicose veins (HCC)   Cellulitis of right anterior lower leg      Admitting Diagnosis: Leg injuries [S89 90XA]  Cellulitis of right anterior lower leg [D52 664]  Age/Sex: 64 y o  female       Admission Orders:    SCD  Scheduled Medications:  acetaminophen, 975 mg, Oral, Q8H ROSARIO  cefazolin, 2,000 mg, Intravenous, Q8H  enoxaparin, 40 mg, Subcutaneous, Daily  saccharomyces boulardii, 250 mg, Oral, BID      Continuous IV Infusions: None  PRN Meds:    aluminum-magnesium hydroxide-simethicone, 30 mL, Oral, Q6H PRN  docusate sodium, 100 mg, Oral, BID PRN  ondansetron, 4 mg, Intravenous, Q6H PRN  oxyCODONE, 5 mg, Oral, Q6H PRN x 1 dose 1/25         IP CONSULT TO INFECTIOUS DISEASES  IP CONSULT TO CASE MANAGEMENT    Network Utilization Review Department  ATTENTION: Please call with any questions or concerns to [phone] and carefully listen to the prompts so that you are directed to the right person  All voicemails are confidential   [facility] all requests for admission clinical reviews, approved or denied determinations and any other requests to dedicated fax number below belonging to the campus where the patient is receiving treatment   List of dedicated fax numbers for the Facilities:  [address] [place] [address] DENIALS (Administrative/Medical Necessity) [phone] [address] (Maternity/NICU/Pediatrics) [phone] [address]  [address] [place] [address] [address] [place] [address] [address] [facility] [place] [address] [phone]

## 2022-01-25 NOTE — ASSESSMENT & PLAN NOTE
Was on Keflex; will change to Ancef once more  Infectious disease consult  Imaging studies CT shows thrombophlebitis with no abscess formation  Doppler - no DVT  Pain management  Heat to area

## 2022-01-25 NOTE — ED PROVIDER NOTES
History  Chief Complaint   Patient presents with    Leg Pain     pt daughter states pt was sent in by Infectious disease due to R lower extremity cellulits     HPI     63 yo F with past medical history of hypertension and right lower leg cellulitis who presents for evaluation of right leg pain  Patient states she has had cellulitis in the right lower leg for the past two months  She has been hospitalized twice over the past two months, with the most recent hospitalization two weeks ago  She was treated with IV cefazolin while inpatient and was discharged home on Keflex  Her daughter states over the past few days, she has had worsening pain and redness in the leg  She has also developed redness in the right upper thigh over the past few days  She has a small ulcer on the right lower leg, with surrounding redness  Denies drainage from the ulcer  Denies fevers, chills, chest pain, shortness of breath, abdominal pain, nausea, vomiting or diarrhea  Denies hx of DVT or PE  Patient states she called her ID doctor today who told her to come to the ER for evaluation and possible admission for IV antibiotics  Prior to Admission Medications   Prescriptions Last Dose Informant Patient Reported? Taking? Acetaminophen 325 MG CAPS  Self No No   Sig: Take 3 capsules (975 mg total) by mouth every 8 (eight) hours as needed (mild pain)   cephalexin (KEFLEX) 500 mg capsule   No No   Sig: Take 1 capsule (500 mg total) by mouth every 6 (six) hours for 11 days   oxyCODONE (ROXICODONE) 5 immediate release tablet  Self Yes No   Sig: take 1 tablet by mouth every 6 hours if needed for pain MAXIMUM DAILY DOSE OF 4 tablets      Facility-Administered Medications: None       Past Medical History:   Diagnosis Date    Hypertension     Ulcer of right shin ([address] )        History reviewed  No pertinent surgical history      Family History   Problem Relation Age of Onset    No Known Problems Mother     No Known Problems Father     No Known Problems Sister     No Known Problems Brother     No Known Problems Son     No Known Problems Daughter     No Known Problems Maternal Grandmother     No Known Problems Maternal Grandfather     No Known Problems Paternal Grandmother     No Known Problems Paternal Grandfather     No Known Problems Maternal Aunt     No Known Problems Maternal Uncle     No Known Problems Paternal Aunt     No Known Problems Paternal Uncle     No Known Problems Cousin      I have reviewed and agree with the history as documented  E-Cigarette/Vaping    E-Cigarette Use Never User      E-Cigarette/Vaping Substances     Social History     Tobacco Use    Smoking status: Never Smoker    Smokeless tobacco: Never Used   Vaping Use    Vaping Use: Never used   Substance Use Topics    Alcohol use: Not Currently    Drug use: Never        Review of Systems   Constitutional: Negative for appetite change, chills and fever  HENT: Negative for congestion, rhinorrhea and sore throat  Respiratory: Negative for cough and shortness of breath  Cardiovascular: Positive for leg swelling  Negative for chest pain  Gastrointestinal: Negative for abdominal pain, diarrhea, nausea and vomiting  Genitourinary: Negative for dysuria, frequency, hematuria and urgency  Musculoskeletal: Negative for arthralgias and myalgias  Skin: Positive for wound  Negative for rash  Leg redness  Neurological: Negative for dizziness, weakness, light-headedness, numbness and headaches  All other systems reviewed and are negative        Physical Exam  ED Triage Vitals   Temperature Pulse Respirations Blood Pressure SpO2   01/24/22 1840 01/24/22 1840 01/24/22 1840 01/24/22 1840 01/24/22 1840   98 °F (36 7 °C) 83 20 168/96 98 %      Temp src Heart Rate Source Patient Position - Orthostatic VS BP Location FiO2 (%)   -- 01/24/22 1840 01/24/22 2218 01/24/22 2218 --    Monitor Lying Right arm       Pain Score       01/24/22 1840       6 Orthostatic Vital Signs  Vitals:    01/24/22 1840 01/24/22 2218   BP: 168/96 (!) 172/80   Pulse: 83 87   Patient Position - Orthostatic VS:  Lying       Physical Exam  Vitals and nursing note reviewed  Constitutional:       General: She is not in acute distress  Appearance: Normal appearance  She is well-developed  She is obese  She is not ill-appearing, toxic-appearing or diaphoretic  HENT:      Head: Normocephalic and atraumatic  Right Ear: External ear normal       Left Ear: External ear normal       Mouth/Throat:      Mouth: Mucous membranes are moist       Pharynx: Oropharynx is clear  Eyes:      Extraocular Movements: Extraocular movements intact  Conjunctiva/sclera: Conjunctivae normal    Cardiovascular:      Rate and Rhythm: Normal rate and regular rhythm  Pulses: Normal pulses  Heart sounds: Normal heart sounds  No murmur heard  No friction rub  No gallop  Pulmonary:      Effort: Pulmonary effort is normal  No respiratory distress  Breath sounds: Normal breath sounds  No wheezing or rales  Abdominal:      General: There is no distension  Palpations: Abdomen is soft  Tenderness: There is no abdominal tenderness  There is no guarding or rebound  Musculoskeletal:         General: Tenderness present  Cervical back: Neck supple  Comments: Mild right leg swelling  Skin:     General: Skin is warm and dry  Coloration: Skin is not pale  Findings: No erythema or rash  Comments: Redness to the right inner thigh with area of induration, very tender to palpation  Small ulcer over the right anterior shin with surrounding erythema, also very tender to palpation  Neurological:      General: No focal deficit present  Mental Status: She is alert and oriented to person, place, and time  Cranial Nerves: No cranial nerve deficit  Sensory: No sensory deficit  Motor: No weakness     Psychiatric:         Mood and Affect: Mood normal          Behavior: Behavior normal          ED Medications  Medications   acetaminophen (TYLENOL) tablet 975 mg (975 mg Oral Given 1/24/22 2310)   oxyCODONE (ROXICODONE) IR tablet 5 mg (has no administration in time range)   docusate sodium (COLACE) capsule 100 mg (has no administration in time range)   ondansetron (ZOFRAN) injection 4 mg (has no administration in time range)   aluminum-magnesium hydroxide-simethicone (MYLANTA) oral suspension 30 mL (has no administration in time range)   enoxaparin (LOVENOX) subcutaneous injection 40 mg (has no administration in time range)   ceFAZolin (ANCEF) IVPB (premix in dextrose) 2,000 mg 50 mL (0 mg Intravenous Stopped 1/25/22 0008)   iohexol (OMNIPAQUE) 350 MG/ML injection (SINGLE-DOSE) 100 mL (100 mL Intravenous Given 1/24/22 2201)   ketorolac (TORADOL) injection 15 mg (15 mg Intravenous Given 1/24/22 2216)       Diagnostic Studies  Results Reviewed     Procedure Component Value Units Date/Time    Comprehensive metabolic panel [[id number]]  (Abnormal) Collected: 01/24/22 2103    Lab Status: Final result Specimen: Blood from Arm, Left Updated: 01/24/22 2138     Sodium 138 mmol/L      Potassium 3 5 mmol/L      Chloride 107 mmol/L      CO2 26 mmol/L      ANION GAP 5 mmol/L      BUN 15 mg/dL      Creatinine 0 72 mg/dL      Glucose 98 mg/dL      Calcium 9 1 mg/dL      AST 85 U/L       U/L      Alkaline Phosphatase 151 U/L      Total Protein 8 2 g/dL      Albumin 3 6 g/dL      Total Bilirubin 0 58 mg/dL      eGFR 90 ml/min/1 73sq m     Narrative:      Meredith guidelines for Chronic Kidney Disease (CKD):     Stage 1 with normal or high GFR (GFR > 90 mL/min/1 73 square meters)    Stage 2 Mild CKD (GFR = 60-89 mL/min/1 73 square meters)    Stage 3A Moderate CKD (GFR = 45-59 mL/min/1 73 square meters)    Stage 3B Moderate CKD (GFR = 30-44 mL/min/1 73 square meters)    Stage 4 Severe CKD (GFR = 15-29 mL/min/1 73 square meters)   Stage 5 End Stage CKD (GFR <15 mL/min/1 73 square meters)  Note: GFR calculation is accurate only with a steady state creatinine    CBC and differential [[id number]] Collected: 01/24/22 2103    Lab Status: Final result Specimen: Blood from Arm, Left Updated: 01/24/22 2113     WBC 8 59 Thousand/uL      RBC 4 36 Million/uL      Hemoglobin 12 9 g/dL      Hematocrit 39 5 %      MCV 91 fL      MCH 29 6 pg      MCHC 32 7 g/dL      RDW 13 3 %      MPV 9 2 fL      Platelets 467 Thousands/uL      nRBC 0 /100 WBCs      Neutrophils Relative 59 %      Immat GRANS % 0 %      Lymphocytes Relative 28 %      Monocytes Relative 11 %      Eosinophils Relative 2 %      Basophils Relative 0 %      Neutrophils Absolute 5 07 Thousands/µL      Immature Grans Absolute 0 01 Thousand/uL      Lymphocytes Absolute 2 38 Thousands/µL      Monocytes Absolute 0 94 Thousand/µL      Eosinophils Absolute 0 17 Thousand/µL      Basophils Absolute 0 02 Thousands/µL                  CT lower extremity w contrast right   Final Result by Ellie Osorio MD (01/24 2240)      Findings consistent with thrombophlebitis of a markedly varicose right greater saphenous vein  No evidence of fluid collection to suggest abscess           Workstation performed: [id number]         XR tibia fibula 2 views RIGHT    (Results Pending)   VAS lower limb venous duplex study, unilateral/limited    (Results Pending)         Procedures  ECG 12 Lead Documentation Only    Date/Time: 1/24/2022 9:18 PM  Performed by: Kayy Tobin MD  Authorized by: Kayy Tobin MD     Indications / Diagnosis:  Leg swelling  ECG reviewed by me, the ED Provider: yes    Patient location:  ED  Previous ECG:     Previous ECG:  Unavailable    Comparison to cardiac monitor: Yes    Interpretation:     Interpretation: normal    Rate:     ECG rate:  74    ECG rate assessment: normal    Rhythm:     Rhythm: sinus rhythm    Ectopy:     Ectopy: none    QRS:     QRS axis:  Normal    QRS intervals: Normal  Conduction:     Conduction: normal    ST segments:     ST segments:  Normal  T waves:     T waves: normal    Q waves:     Q waves:  V1, V2 and V3          ED Course                                       MDM     65 yo F with past medical history of hypertension and right lower leg cellulitis who presents for evaluation of right leg pain and redness  She is on Keflex for cellulitis  Differential diagnosis includes: cellulitis, lymphangitis, abscess, DVT  Will check labs including CBC and CMP  Will obtain xray tib/fib and venous duplex  POCUS of the right thigh shows fluid collection  Will obtain CT scan right leg  Will give toradol for pain  Reviewed labs, no marked abnormalities  Venous duplex shows superficial thrombophlebitis of varicose veins in the upper right thigh  CT scan shows no abscess or fluid collection  Will admit patient to [facility] for IV antibiotics  Patient and her daughter are agreeable with this plan  Disposition  Final diagnoses:   Cellulitis of right anterior lower leg     Time reflects when diagnosis was documented in both MDM as applicable and the Disposition within this note     Time User Action Codes Description Comment    1/24/2022 10:11 PM Johnie Donon Add [X26 013] Cellulitis of right anterior lower leg       ED Disposition     None      Follow-up Information    None         Patient's Medications   Discharge Prescriptions    No medications on file     No discharge procedures on file  PDMP Review       Value Time User    PDMP Reviewed  Yes 12/27/2021  3:55 PM Scarlett Copeland MD           ED Provider  Attending physically available and evaluated Tawnyakayleen Allortiz  JOSE managed the patient along with the ED Attending      Electronically Signed by         Maia Herrera MD  01/25/22 2493

## 2022-01-25 NOTE — ED ATTENDING ATTESTATION
1/24/2022  IRicci MD, saw and evaluated the patient  I have discussed the patient with the resident/non-physician practitioner and agree with the resident's/non-physician practitioner's findings, Plan of Care, and MDM as documented in the resident's/non-physician practitioner's note, except where noted  All available labs and Radiology studies were reviewed  I was present for key portions of any procedure(s) performed by the resident/non-physician practitioner and I was immediately available to provide assistance  At this point I agree with the current assessment done in the Emergency Department  I have conducted an independent evaluation of this patient a history and physical is as follows: This is a 26-year-old with history of recurrent cellulitis to her right lower extremity  Patient has been recently admitted with cellulitis of her right lower extremity, and has a healing ulcer there  She had MSSA, and has been on Keflex at home  She still has 3 more days of Keflex to finish  Patient has subsequently developed increased pain around her prior site as well as erythema around her proximal medial thigh, with swelling and tenderness  Patient states that it feels hot and aching  Patient has not had fevers, chills, or malaise  She has been compliant with her antibiotics  Her review systems is otherwise negative in 12 systems reviewed  On exam the patient has a small eschar to her right anterior shin that appears to be healing appropriately  She has some surrounding skin changes, but these do not appear to be cellulitic or acute  On exam of the patient's proximal thigh, she has a large area of erythema and induration which is very tender to touch  I do not appreciate a fluctuant area  Impression:  Erythema and tenderness to medial thigh, may represent extension of cellulitis, abscess, or venous complication    Will plan to duplex the limb, check labs, IV antibiotics  ED Course Critical Care Time  Procedures

## 2022-01-25 NOTE — ASSESSMENT & PLAN NOTE
· Recurrent admissions for recurrent cellulitis, has failed PO  · Responded to IV Ancef previously, continue IV Ancef at this time  · ID consult appreciated    Likely will transition to high dose PO Keflex on discharge as suspect previously inadequate levels were achieved with PO   · Serial exams

## 2022-01-26 VITALS
WEIGHT: 200 LBS | SYSTOLIC BLOOD PRESSURE: 130 MMHG | DIASTOLIC BLOOD PRESSURE: 80 MMHG | HEIGHT: 62 IN | BODY MASS INDEX: 36.8 KG/M2 | TEMPERATURE: 98.7 F | RESPIRATION RATE: 16 BRPM | HEART RATE: 77 BPM | OXYGEN SATURATION: 100 %

## 2022-01-26 PROBLEM — I80.9 THROMBOPHLEBITIS: Status: ACTIVE | Noted: 2022-01-26

## 2022-01-26 LAB — C DIFF TOX GENS STL QL NAA+PROBE: NEGATIVE

## 2022-01-26 PROCEDURE — 99238 HOSP IP/OBS DSCHRG MGMT 30/<: CPT | Performed by: PHYSICIAN ASSISTANT

## 2022-01-26 PROCEDURE — 99233 SBSQ HOSP IP/OBS HIGH 50: CPT | Performed by: INTERNAL MEDICINE

## 2022-01-26 RX ORDER — OXYCODONE HYDROCHLORIDE 5 MG/1
5 TABLET ORAL EVERY 6 HOURS PRN
Qty: 10 TABLET | Refills: 0 | Status: SHIPPED | OUTPATIENT
Start: 2022-01-26 | End: 2022-02-05

## 2022-01-26 RX ADMIN — ENOXAPARIN SODIUM 40 MG: 40 INJECTION SUBCUTANEOUS at 08:20

## 2022-01-26 RX ADMIN — CEFAZOLIN SODIUM 2000 MG: 2 SOLUTION INTRAVENOUS at 14:02

## 2022-01-26 RX ADMIN — Medication 250 MG: at 08:20

## 2022-01-26 RX ADMIN — ACETAMINOPHEN 975 MG: 325 TABLET, FILM COATED ORAL at 05:17

## 2022-01-26 RX ADMIN — ACETAMINOPHEN 975 MG: 325 TABLET, FILM COATED ORAL at 14:02

## 2022-01-26 RX ADMIN — CEFAZOLIN SODIUM 2000 MG: 2 SOLUTION INTRAVENOUS at 05:19

## 2022-01-26 NOTE — PLAN OF CARE
Problem: Potential for Falls  Goal: Patient will remain free of falls  Description: INTERVENTIONS:  - Educate patient/family on patient safety including physical limitations  - Instruct patient to call for assistance with activity   - Consult OT/PT to assist with strengthening/mobility   - Keep Call bell within reach  - Keep bed low and locked with side rails adjusted as appropriate  - Keep care items and personal belongings within reach  - Initiate and maintain comfort rounds  - Make Fall Risk Sign visible to staff  - Offer Toileting every 2 Hours, in advance of need  - Initiate/Maintain bed alarm  - Obtain necessary fall risk management equipment  - Apply yellow socks and bracelet for high fall risk patients  - Consider moving patient to room near nurses station  Outcome: Progressing     Problem: Nutrition/Hydration-ADULT  Goal: Nutrient/Hydration intake appropriate for improving, restoring or maintaining nutritional needs  Description: Monitor and assess patient's nutrition/hydration status for malnutrition  Collaborate with interdisciplinary team and initiate plan and interventions as ordered  Monitor patient's weight and dietary intake as ordered or per policy  Utilize nutrition screening tool and intervene as necessary  Determine patient's food preferences and provide high-protein, high-caloric foods as appropriate       INTERVENTIONS:  - Monitor oral intake, urinary output, labs, and treatment plans  - Assess nutrition and hydration status and recommend course of action  - Evaluate amount of meals eaten  - Assist patient with eating if necessary   - Allow adequate time for meals  - Recommend/ encourage appropriate diets, oral nutritional supplements, and vitamin/mineral supplements  - Order, calculate, and assess calorie counts as needed  - Recommend, monitor, and adjust tube feedings and TPN/PPN based on assessed needs  - Assess need for intravenous fluids  - Provide specific nutrition/hydration education as appropriate  - Include patient/family/caregiver in decisions related to nutrition  Outcome: Progressing     Problem: PAIN - ADULT  Goal: Verbalizes/displays adequate comfort level or baseline comfort level  Description: Interventions:  - Encourage patient to monitor pain and request assistance  - Assess pain using appropriate pain scale  - Administer analgesics based on type and severity of pain and evaluate response  - Implement non-pharmacological measures as appropriate and evaluate response  - Consider cultural and social influences on pain and pain management  - Notify physician/advanced practitioner if interventions unsuccessful or patient reports new pain  Outcome: Progressing     Problem: INFECTION - ADULT  Goal: Absence or prevention of progression during hospitalization  Description: INTERVENTIONS:  - Assess and monitor for signs and symptoms of infection  - Monitor lab/diagnostic results  - Monitor all insertion sites, i e  indwelling lines, tubes, and drains  - Monitor endotracheal if appropriate and nasal secretions for changes in amount and color  - [place] appropriate cooling/warming therapies per order  - Administer medications as ordered  - Instruct and encourage patient and family to use good hand hygiene technique  - Identify and instruct in appropriate isolation precautions for identified infection/condition  Outcome: Progressing  Goal: Absence of fever/infection during neutropenic period  Description: INTERVENTIONS:  - Monitor WBC    Outcome: Progressing     Problem: SAFETY ADULT  Goal: Patient will remain free of falls  Description: INTERVENTIONS:  - Educate patient/family on patient safety including physical limitations  - Instruct patient to call for assistance with activity   - Consult OT/PT to assist with strengthening/mobility   - Keep Call bell within reach  - Keep bed low and locked with side rails adjusted as appropriate  - Keep care items and personal belongings within reach  - Initiate and maintain comfort rounds  - Make Fall Risk Sign visible to staff  - Offer Toileting every 2 Hours, in advance of need  - Initiate/Maintain bed alarm  - Obtain necessary fall risk management equipment  - Apply yellow socks and bracelet for high fall risk patients  - Consider moving patient to room near nurses station  Outcome: Progressing  Goal: Maintain or return to baseline ADL function  Description: INTERVENTIONS:  -  Assess patient's ability to carry out ADLs; assess patient's baseline for ADL function and identify physical deficits which impact ability to perform ADLs (bathing, care of mouth/teeth, toileting, grooming, dressing, etc )  - Assess/evaluate cause of self-care deficits   - Assess range of motion  - Assess patient's mobility; develop plan if impaired  - Assess patient's need for assistive devices and provide as appropriate  - Encourage maximum independence but intervene and supervise when necessary  - Involve family in performance of ADLs  - Assess for home care needs following discharge   - Consider OT consult to assist with ADL evaluation and planning for discharge  - Provide patient education as appropriate  Outcome: Progressing  Goal: Maintains/Returns to pre admission functional level  Description: INTERVENTIONS:  - Perform BMAT or MOVE assessment daily    - Set and communicate daily mobility goal to care team and patient/family/caregiver  - Collaborate with rehabilitation services on mobility goals if consulted  - Perform Range of Motion 4 times a day  - Reposition patient every 2 hours    - Dangle patient 4 times a day  - Stand patient 3 times a day  - Ambulate patient 3 times a day  - Out of bed to chair 3 times a day   - Out of bed for meals 3 times a day  - Out of bed for toileting  - Record patient progress and toleration of activity level   Outcome: Progressing     Problem: DISCHARGE PLANNING  Goal: Discharge to home or other facility with appropriate resources  Description: INTERVENTIONS:  - Identify barriers to discharge w/patient and caregiver  - Arrange for needed discharge resources and transportation as appropriate  - Identify discharge learning needs (meds, wound care, etc )  - Arrange for interpretive services to assist at discharge as needed  - Refer to Case Management Department for coordinating discharge planning if the patient needs post-hospital services based on physician/advanced practitioner order or complex needs related to functional status, cognitive ability, or social support system  Outcome: Progressing     Problem: Knowledge Deficit  Goal: Patient/family/caregiver demonstrates understanding of disease process, treatment plan, medications, and discharge instructions  Description: Complete learning assessment and assess knowledge base    Interventions:  - Provide teaching at level of understanding  - Provide teaching via preferred learning methods  Outcome: Progressing

## 2022-01-26 NOTE — PROGRESS NOTES
[facility]  Progress Note Kat Cota 1960, 64 y o  female MRN: [id number]  Unit/Bed#: [facility] 320-01 Encounter: [id number]  Primary Care Provider: Dwight Rg   Date and time admitted to hospital: 2022  8:28 PM      * Cellulitis of right anterior lower leg  Assessment & Plan  · Recurrent admissions for recurrent cellulitis, has failed PO  · Responded to IV Ancef previously, continue IV Ancef at this time  · ID consult appreciated  Likely will transition to high dose PO Keflex on discharge as suspect previously inadequate levels were achieved with PO   · Serial exams     Thrombophlebitis  Assessment & Plan  · RLE superficial thrombophlebitis noted in varicose veins at mid thigh to the knee   · Supportive care     Venous ulcer of right lower extremity with varicose veins ([address] )  Assessment & Plan  · See plans regarding cellulitis  · Outpt f/u with vascular           VTE Pharmacologic Prophylaxis:   Moderate Risk (Score 3-4) - Pharmacological DVT Prophylaxis Ordered: enoxaparin (Lovenox)  Patient Centered Rounds: I performed bedside rounds with nursing staff today  Discussions with Specialists or Other Care Team Provider:     Education and Discussions with Family / Patient: Patient declined call to   Time Spent for Care: 20 minutes  More than 50% of total time spent on counseling and coordination of care as described above  Current Length of Stay: 2 day(s)  Current Patient Status: Inpatient   Certification Statement: The patient will continue to require additional inpatient hospital stay due to IV abx  Discharge Plan: Anticipate discharge in 24-48 hrs to home      Code Status: Level 1 - Full Code    Subjective:   Pt has no complaints, diarrhea does not seem as bad today     Objective:     Vitals:   Temp (24hrs), Av 1 °F (36 7 °C), Min:97 7 °F (36 5 °C), Max:98 9 °F (37 2 °C)    Temp:  [97 7 °F (36 5 °C)-98 9 °F (37 2 °C)] 97 7 °F (36 5 °C)  HR:  [55-64] 55  Resp:  [13-16] 16  BP: (133-138)/(67-71) 138/67  SpO2:  [98 %-99 %] 98 %  Body mass index is 36 58 kg/m²  Input and Output Summary (last 24 hours): Intake/Output Summary (Last 24 hours) at 1/26/2022 1340  Last data filed at 1/26/2022 0827  Gross per 24 hour   Intake 660 ml   Output --   Net 660 ml       Physical Exam:   Physical Exam  Vitals reviewed  Constitutional:       General: She is not in acute distress  Appearance: She is not toxic-appearing  HENT:      Head: Normocephalic and atraumatic  Eyes:      Extraocular Movements: Extraocular movements intact  Cardiovascular:      Rate and Rhythm: Normal rate and regular rhythm  Pulmonary:      Effort: Pulmonary effort is normal  No respiratory distress  Breath sounds: Normal breath sounds  Abdominal:      General: Bowel sounds are normal  There is no distension  Palpations: Abdomen is soft  Tenderness: There is no abdominal tenderness  Musculoskeletal:         General: Normal range of motion  Cervical back: Normal range of motion  Skin:     Findings: Erythema and lesion (RLE shin) present  Neurological:      General: No focal deficit present  Mental Status: She is alert and oriented to person, place, and time  Psychiatric:         Mood and Affect: Mood normal          Behavior: Behavior normal          Thought Content:  Thought content normal           Additional Data:     Labs:  Results from last 7 days   Lab Units 01/25/22  0637   WBC Thousand/uL 6 85   HEMOGLOBIN g/dL 12 1   HEMATOCRIT % 37 0   PLATELETS Thousands/uL 288   NEUTROS PCT % 60   LYMPHS PCT % 24   MONOS PCT % 12   EOS PCT % 3     Results from last 7 days   Lab Units 01/25/22  0637   SODIUM mmol/L 142   POTASSIUM mmol/L 3 6   CHLORIDE mmol/L 110*   CO2 mmol/L 27   BUN mg/dL 11   CREATININE mg/dL 0 65   ANION GAP mmol/L 5   CALCIUM mg/dL 8 8   ALBUMIN g/dL 3 0*   TOTAL BILIRUBIN mg/dL 1 08*   ALK PHOS U/L 133*   ALT U/L 91* AST U/L 59*   GLUCOSE RANDOM mg/dL 98                       Lines/Drains:  Invasive Devices  Report    Peripheral Intravenous Line            Peripheral IV 01/24/22 Left Antecubital 1 day                      Imaging: No pertinent imaging reviewed  Recent Cultures (last 7 days):         Last 24 Hours Medication List:   Current Facility-Administered Medications   Medication Dose Route Frequency Provider Last Rate    acetaminophen  975 mg Oral [address] Ricardo Adkins MD      aluminum-magnesium hydroxide-simethicone  30 mL Oral Q6H PRN Francoise Adkins MD      cefazolin  2,000 mg Intravenous Q8H Francoise Adkins MD 2,000 mg (01/26/22 0519)    docusate sodium  100 mg Oral BID PRN Francoise Adkins MD      enoxaparin  40 mg Subcutaneous Daily Francoise Adkins MD      ondansetron  4 mg Intravenous Q6H PRN Francoise Adkins MD      oxyCODONE  5 mg Oral Q6H PRN Francoise Adkins MD      saccharomyces boulardii  250 mg Oral BID Kei Durán PA-C          Today, Patient Was Seen By: Kei Durán PA-C    **Please Note: This note may have been constructed using a voice recognition system  **

## 2022-01-26 NOTE — PLAN OF CARE
Problem: Potential for Falls  Goal: Patient will remain free of falls  Description: INTERVENTIONS:  - Educate patient/family on patient safety including physical limitations  - Instruct patient to call for assistance with activity   - Consult OT/PT to assist with strengthening/mobility   - Keep Call bell within reach  - Keep bed low and locked with side rails adjusted as appropriate  - Keep care items and personal belongings within reach  - Initiate and maintain comfort rounds  - Make Fall Risk Sign visible to staff  - Apply yellow socks and bracelet for high fall risk patients  - Consider moving patient to room near nurses station  Outcome: Progressing     Problem: Nutrition/Hydration-ADULT  Goal: Nutrient/Hydration intake appropriate for improving, restoring or maintaining nutritional needs  Description: Monitor and assess patient's nutrition/hydration status for malnutrition  Collaborate with interdisciplinary team and initiate plan and interventions as ordered  Monitor patient's weight and dietary intake as ordered or per policy  Utilize nutrition screening tool and intervene as necessary  Determine patient's food preferences and provide high-protein, high-caloric foods as appropriate       INTERVENTIONS:  - Monitor oral intake, urinary output, labs, and treatment plans  - Assess nutrition and hydration status and recommend course of action  - Evaluate amount of meals eaten  - Assist patient with eating if necessary   - Allow adequate time for meals  - Recommend/ encourage appropriate diets, oral nutritional supplements, and vitamin/mineral supplements  - Order, calculate, and assess calorie counts as needed  - Recommend, monitor, and adjust tube feedings and TPN/PPN based on assessed needs  - Assess need for intravenous fluids  - Provide specific nutrition/hydration education as appropriate  - Include patient/family/caregiver in decisions related to nutrition  Outcome: Progressing     Problem: PAIN - ADULT  Goal: Verbalizes/displays adequate comfort level or baseline comfort level  Description: Interventions:  - Encourage patient to monitor pain and request assistance  - Assess pain using appropriate pain scale  - Administer analgesics based on type and severity of pain and evaluate response  - Implement non-pharmacological measures as appropriate and evaluate response  - Consider cultural and social influences on pain and pain management  - Notify physician/advanced practitioner if interventions unsuccessful or patient reports new pain  Outcome: Progressing     Problem: INFECTION - ADULT  Goal: Absence or prevention of progression during hospitalization  Description: INTERVENTIONS:  - Assess and monitor for signs and symptoms of infection  - Monitor lab/diagnostic results  - Monitor all insertion sites, i e  indwelling lines, tubes, and drains  - Monitor endotracheal if appropriate and nasal secretions for changes in amount and color  - [place] appropriate cooling/warming therapies per order  - Administer medications as ordered  - Instruct and encourage patient and family to use good hand hygiene technique  - Identify and instruct in appropriate isolation precautions for identified infection/condition  Outcome: Progressing  Goal: Absence of fever/infection during neutropenic period  Description: INTERVENTIONS:  - Monitor WBC    Outcome: Progressing     Problem: SAFETY ADULT  Goal: Patient will remain free of falls  Description: INTERVENTIONS:  - Educate patient/family on patient safety including physical limitations  - Instruct patient to call for assistance with activity   - Consult OT/PT to assist with strengthening/mobility   - Keep Call bell within reach  - Keep bed low and locked with side rails adjusted as appropriate  - Keep care items and personal belongings within reach  - Initiate and maintain comfort rounds  - Make Fall Risk Sign visible to staff  - Apply yellow socks and bracelet for high fall risk patients  - Consider moving patient to room near nurses station  Outcome: Progressing  Goal: Maintain or return to baseline ADL function  Description: INTERVENTIONS:  -  Assess patient's ability to carry out ADLs; assess patient's baseline for ADL function and identify physical deficits which impact ability to perform ADLs (bathing, care of mouth/teeth, toileting, grooming, dressing, etc )  - Assess/evaluate cause of self-care deficits   - Assess range of motion  - Assess patient's mobility; develop plan if impaired  - Assess patient's need for assistive devices and provide as appropriate  - Encourage maximum independence but intervene and supervise when necessary  - Involve family in performance of ADLs  - Assess for home care needs following discharge   - Consider OT consult to assist with ADL evaluation and planning for discharge  - Provide patient education as appropriate  Outcome: Progressing  Goal: Maintains/Returns to pre admission functional level  Description: INTERVENTIONS:  - Perform BMAT or MOVE assessment daily    - Set and communicate daily mobility goal to care team and patient/family/caregiver     - Collaborate with rehabilitation services on mobility goals if consulted  - Out of bed for toileting  - Record patient progress and toleration of activity level   Outcome: Progressing     Problem: DISCHARGE PLANNING  Goal: Discharge to home or other facility with appropriate resources  Description: INTERVENTIONS:  - Identify barriers to discharge w/patient and caregiver  - Arrange for needed discharge resources and transportation as appropriate  - Identify discharge learning needs (meds, wound care, etc )  - Arrange for interpretive services to assist at discharge as needed  - Refer to Case Management Department for coordinating discharge planning if the patient needs post-hospital services based on physician/advanced practitioner order or complex needs related to functional status, cognitive ability, or social support system  Outcome: Progressing     Problem: Knowledge Deficit  Goal: Patient/family/caregiver demonstrates understanding of disease process, treatment plan, medications, and discharge instructions  Description: Complete learning assessment and assess knowledge base    Interventions:  - Provide teaching at level of understanding  - Provide teaching via preferred learning methods  Outcome: Progressing

## 2022-01-26 NOTE — DISCHARGE SUMMARY
[facility]  Discharge- Eduardo Shafer 1960, 64 y o  female MRN: [id number]  Unit/Bed#: [facility] 320-01 Encounter: [id number]  Primary Care Provider: Cadence Hameed   Date and time admitted to hospital: 1/24/2022  8:28 PM    * Cellulitis of right anterior lower leg  Assessment & Plan  · Recurrent admissions for recurrent cellulitis, has failed PO  · Responded to IV Ancef previously  Trial of IV Ancef again, with no change, all appears to be chronic changes  D/w ID, consult appreciated, feel related to phlebitis  Suspicion for cellulitis is low and has been ruled out at this time  · Supportive care for phlebitis  · F/u with wound care and vascular on discharge   · No further abx at this time   · Serial exams     Thrombophlebitis  Assessment & Plan  · RLE superficial thrombophlebitis noted in varicose veins at mid thigh to the knee   · Supportive care     Venous ulcer of right lower extremity with varicose veins ([address] )  Assessment & Plan  · See plans regarding cellulitis  · Outpt f/u with vascular         Medical Problems             Resolved Problems  Date Reviewed: 1/26/2022    None              Discharging Physician / Practitioner: Rosa Bates PA-C  PCP: Cadence Hameed  Admission Date:   Admission Orders (From admission, onward)     Ordered        01/24/22 2212  Inpatient Admission  Once                      Discharge Date: 01/26/22    Consultations During Hospital Stay:  · ID     Procedures Performed:   · None    Significant Findings / Test Results:   · None    Incidental Findings:   · none     Test Results Pending at Discharge (will require follow up):   · none     Outpatient Tests Requested:  · none    Complications:  none    Reason for Admission: cellulitis     Hospital Course:   Eduardo Shafer is a 64 y o  female patient who originally presented to the hospital on 1/24/2022 due to RLE leg pain  She has venous stasis with chronic RLE ulcer    She has been admitted recently for RLE cellulitis, which responds to IV Ancef, but seems to recur once transitioned to PO abx  She returned with worsening RLE pain  Patient was seen by infectious disease given concern for recurrent cellulitis and multiple rounds of antibiotics  She was started on IV Ancef  There has been no change clinically to her exam with IV abx  She previously responded quite well  Given this and lack of fever and no leukocytosis, felt cellulitis to be less likely and antibiotics were discontinued  Feel related to phlebitis  Recommend she have outpt f/u with wound care and vascular surgery  Please see above list of diagnoses and related plan for additional information  Condition at Discharge: good    Discharge Day Visit / Exam:   * Please refer to separate progress note for these details *    Discussion with Family: Patient declined call to   Discharge instructions/Information to patient and family:   See after visit summary for information provided to patient and family  Provisions for Follow-Up Care:  See after visit summary for information related to follow-up care and any pertinent home health orders  Disposition:   Home    Planned Readmission: no     Discharge Statement:  I spent 25 minutes discharging the patient  This time was spent on the day of discharge  I had direct contact with the patient on the day of discharge  Greater than 50% of the total time was spent examining patient, answering all patient questions, arranging and discussing plan of care with patient as well as directly providing post-discharge instructions  Additional time then spent on discharge activities  Discharge Medications:  See after visit summary for reconciled discharge medications provided to patient and/or family        **Please Note: This note may have been constructed using a voice recognition system**

## 2022-01-26 NOTE — ASSESSMENT & PLAN NOTE
· Recurrent admissions for recurrent cellulitis, has failed PO  · Responded to IV Ancef previously  Trial of IV Ancef again, with no change, all appears to be chronic changes  D/w ID, consult appreciated, feel related to phlebitis    Suspicion for cellulitis is low and has been ruled out at this time  · Supportive care for phlebitis  · F/u with wound care and vascular on discharge   · No further abx at this time   · Serial exams

## 2022-01-26 NOTE — PROGRESS NOTES
Progress Note - Infectious Disease   Deirdre Hedrick 64 y o  female MRN: [id number]  Unit/Bed#: [facility] 320-01 Encounter: [id number]      Impression/Plan:  1  Right leg cellulitis  Exam equivocal for cellulitis v  chronic venous stasis  Patient is clinically and systemically stable without leukocytosis or fever  Patient continues to improve on IV Ancef, should she continue to improve, will likely switch to high dose PO Keflex on discharge  Worsening outpatient symptoms likely due to inadequate tissue levels of oral Keflex  Continue IV cefazolin  Serial exams  Monitor temperature and white blood cell count  2  Right thigh thrombophlebitis  Area remains non-tender without local signs of erythema, edema, or warmth  Encouraged elevation and heat application  Serial exams  3  Chronic LE venous stasis with RLE ulceration  Manage outpatient by vascular surgery  Appears unchanged from last admission  Local wound care  Encouraged elevation  Antibiotics:  IV Cefazolin    24 Hour events:  No acute events  She is tolerating antibiotics well  She states that the wound is painful but appears to be improving in appearance  Subjective:  Patient has no fever, chills, sweats; no nausea, vomiting, diarrhea; no cough, shortness of breath; no pain  No new symptoms  Objective:  Vitals:  Temp:  [97 7 °F (36 5 °C)-98 9 °F (37 2 °C)] 97 7 °F (36 5 °C)  HR:  [55-64] 55  Resp:  [13-16] 16  BP: (133-138)/(67-71) 138/67  SpO2:  [98 %-99 %] 98 %  Temp (24hrs), Av 1 °F (36 7 °C), Min:97 7 °F (36 5 °C), Max:98 9 °F (37 2 °C)  Current: Temperature: 97 7 °F (36 5 °C)    Physical Exam:   General Appearance:  Alert, interactive, nontoxic, no acute distress  Throat: Oropharynx moist without lesions  Lungs:   Clear to auscultation bilaterally; no wheezes, rhonchi or rales; respirations unlabored   Heart:  RRR; no murmur, rub or gallop   Abdomen:   Soft, non-tender, non-distended, positive bowel sounds       Extremities: No clubbing, cyanosis or edema   Skin: R Anterior shin venous stasis ulcer with dry, granular base  There is localized erythema with a violaceous border  No purulence, fluctuance or drainage  Tender to palpation of wound borders  Right medial thigh raised lesion without clinical signs of infection consistent with findings of thrombophlebitis  Labs, Imaging, & Other studies:   All pertinent labs and imaging studies were personally reviewed  Results from last 7 days   Lab Units 01/25/22  0637 01/24/22  2103   WBC Thousand/uL 6 85 8 59   HEMOGLOBIN g/dL 12 1 12 9   PLATELETS Thousands/uL 288 312     Results from last 7 days   Lab Units 01/25/22  0637 01/24/22  2103 01/24/22  2103   POTASSIUM mmol/L 3 6   < > 3 5   CHLORIDE mmol/L 110*   < > 107   CO2 mmol/L 27   < > 26   BUN mg/dL 11   < > 15   CREATININE mg/dL 0 65   < > 0 72   EGFR ml/min/1 73sq m 96   < > 90   CALCIUM mg/dL 8 8   < > 9 1   AST U/L 59*  --  85*   ALT U/L 91*   < > 115*   ALK PHOS U/L 133*   < > 151*    < > = values in this interval not displayed

## 2022-01-26 NOTE — CASE MANAGEMENT
Case Management Assessment    Patient name Rufus Sandhoff  Location [facility] 320/[facility] 320-01 MRN [id number]  : 1960 Date 2022       Current Admission Date: 2022  Current Admission Diagnosis:Cellulitis of right anterior lower leg   Patient Active Problem List    Diagnosis Date Noted    Thrombophlebitis 2022    Diarrhea 2022    COVID-19 2022    Elevated liver enzymes 2022    Hypokalemia 2022    Sepsis ([facility] [address] ) 2022    Chronic venous hypertension (idiopathic) with ulcer of right lower extremity ([facility] [address] ) 2022    Ven[facility] of right lower extremity with varic[facility] veins [facility] ) 2021    Primary hypertension 2021    Cellulitis of right anterior lower leg 12/15/2021      LOS (days): 2  Geometric Mean LOS (GMLOS) (days):   Days to GMLOS:     OBJECTIVE:  PATIENT READMITTED TO HOSPITAL  Risk of Unplanned Readmission Score: 12         Current admission status: Inpatient       Preferred Pharmacy:   [facility] [address] [place] [address] - [phone] [place] [id number]  Phone: [phone] Fax: [phone]    Primary Care Provider: Hardik Mitchell    Primary Insurance: Elvira Sam  Secondary Insurance:     ASSESSMENT:  [address] [facility] [employer] Representative - Spouse   Primary Phone: [phone] (Home)                         Readmission Root Cause  30 Day Readmission: Yes  Who directed you to return to the hospital?: Self  Did you understand whom to contact if you had questions or problems?: Yes  Did you get your prescriptions before you left the hospital?: Yes  Were you able to get your prescriptions filled when you left the hospital?: Yes  Did you take your medications as prescribed?: Yes  Were you able to get to your follow-up appointments?: Yes  During previous admission, was a post-acute recommendation made?: No  Patient was readmitted due to: RLE Cellulitis  Action Plan: IV Cefazolin    Patient Information  Admitted from[de-identified] Home  Mental Status: Alert  During Assessment patient was accompanied by: Not accompanied during assessment  Assessment information provided by[de-identified] Patient  Support Systems: Spouse/significant other  Home entry access options   Select all that apply : Stairs  Number of steps to enter home : 2  Do the steps have railings?: No  Type of Current Residence: 2 story home  Upon entering residence, is there a bedroom on the main floor (no further steps)?: No  A bedroom is located on the following floor levels of residence (select all that apply):: 2nd Floor  Upon entering residence, is there a bathroom on the main floor (no further steps)?: No  Indicate which floors of current residence have a bathroom (select all the apply):: 2nd Floor  Number of steps to 2nd floor from main floor: One Flight  Living Arrangements: Lives w/ Spouse/significant other  Is patient a ?: No    Activities of Daily Living Prior to Admission  Functional Status: Independent  Completes ADLs independently?: Yes  Ambulates independently?: Yes  Does patient use assisted devices?: No  Does patient currently own DME?: No  Does patient have a history of Outpatient Therapy (PT/OT)?: No  Does the patient have a history of Short-Term Rehab?: No  Does patient have a history of HHC?: No  Does patient currently have [facility]?: No         Patient Information Continued  Income Source: Unemployed  Does patient have prescription coverage?: Yes  Does patient receive dialysis treatments?: No  Does patient have a history of substance abuse?: No  Does patient have a history of Mental Health Diagnosis?: No         Means of Transportation  Means of Transport to hospitals[de-identified] Drives Self

## 2022-01-26 NOTE — ASSESSMENT & PLAN NOTE
· RLE superficial thrombophlebitis noted in varicose veins at mid thigh to the knee   · Supportive care

## 2022-01-26 NOTE — DISCHARGE INSTRUCTIONS
Your symptoms appear more consist with phlebitis (inflammation of the veins) rather than cellulitis/infection  Recommend NSAIDs (ibuprofen/motrin), warm compresses for pain relief  Follow up with wound care, vascular surgery, and your primary care provider on discharge  If you experience worsening redness/warmth to the area, fever, sweats, chills, please contact your doctor or return for evaluation

## 2022-01-28 ENCOUNTER — OFFICE VISIT (OUTPATIENT)
Dept: WOUND CARE | Facility: HOSPITAL | Age: 62
End: 2022-01-28
Payer: COMMERCIAL

## 2022-01-28 VITALS
RESPIRATION RATE: 16 BRPM | DIASTOLIC BLOOD PRESSURE: 92 MMHG | HEART RATE: 87 BPM | SYSTOLIC BLOOD PRESSURE: 164 MMHG | TEMPERATURE: 96.7 F

## 2022-01-28 DIAGNOSIS — L97.912 NON-PRESSURE CHRONIC ULCER OF RIGHT LOWER LEG WITH FAT LAYER EXPOSED (HCC): ICD-10-CM

## 2022-01-28 DIAGNOSIS — I87.311 CHRONIC VENOUS HYPERTENSION (IDIOPATHIC) WITH ULCER OF RIGHT LOWER EXTREMITY (HCC): Primary | ICD-10-CM

## 2022-01-28 DIAGNOSIS — L97.919 CHRONIC VENOUS HYPERTENSION (IDIOPATHIC) WITH ULCER OF RIGHT LOWER EXTREMITY (HCC): Primary | ICD-10-CM

## 2022-01-28 PROCEDURE — 97597 DBRDMT OPN WND 1ST 20 CM/<: CPT | Performed by: NURSE PRACTITIONER

## 2022-01-28 RX ORDER — LIDOCAINE 40 MG/G
CREAM TOPICAL ONCE
Status: COMPLETED | OUTPATIENT
Start: 2022-01-28 | End: 2022-01-28

## 2022-01-28 RX ADMIN — LIDOCAINE: 40 CREAM TOPICAL at 14:40

## 2022-01-28 NOTE — PATIENT INSTRUCTIONS
Orders Placed This Encounter   Procedures    Wound cleansing and dressings     RLE wound:  Wound care 3x/wk  Keep dressing clean dry and intact on non dressing change days  On dressing change days, remove dressing and shower  Redress directly after shower  Apply dermagran gauze cut to fit the wound  Cover with gauze, secure with rolled gauze  This was applied today  4x4 dermagran gauze given to patient today  Standing Status:   Future     Standing Expiration Date:   1/28/2023    Wound compression and edema control     Elastic Tubular Stocking size F    Tubular elastic bandage: Apply from base of toes to behind the knee  Apply in AM, may remove for sleep  Avoid prolonged standing in one place  Elevate leg(s) above the level of the heart when sitting or as much as possible  This was applied today       Standing Status:   Future     Standing Expiration Date:   1/28/2023

## 2022-01-28 NOTE — PROGRESS NOTES
Patient ID: Oly Peck is a 64 y o  female Date of Birth 1960     Chief Complaint  Chief Complaint   Patient presents with    Follow Up Wound Care Visit     RLE wound       Allergies  Wound dressing adhesive    Assessment:     Diagnoses and all orders for this visit:    Chronic venous hypertension (idiopathic) with ulcer of right lower extremity (HCC)  -     lidocaine (LMX) 4 % cream  -     Wound cleansing and dressings; Future  -     Wound compression and edema control; Future    Non-pressure chronic ulcer of right lower leg with fat layer exposed ([address] )  -     lidocaine (LMX) 4 % cream  -     Wound cleansing and dressings; Future  -     Wound compression and edema control; Future    Other orders  -     Debridement               Debridement   Wound 12/15/21 Venous Ulcer Pretibial Distal;Right    Universal Protocol:  Consent: Written consent obtained  Consent given by: patient  Time out: Immediately prior to procedure a "time out" was called to verify the correct patient, procedure, equipment, support staff and site/side marked as required  Timeout called at: 1/28/2022 3:37 PM   Patient understanding: patient states understanding of the procedure being performed  Patient consent: the patient's understanding of the procedure matches consent given  Procedure consent matches procedure scheduled: N/A  Relevant documents present and verified: N/A  Test results available and properly labeled: N/A  Site marked: the operative site was marked  Imaging studies available: N/A  Required blood products, implants, devices, and special equipment available: N/A    Patient identity confirmed: verbally with patient      Performed by: NP  Debridement type: selective  Pain control: lidocaine 4%  Pre-debridement measurements  Length (cm): 0 5  Width (cm): 0 4  Depth (cm): 0 1  Surface Area (cm^2): 0 2  Volume (cm^3): 0 02    Post-debridement measurements  Length (cm): 0 5  Width (cm): 0 4  Depth (cm): 0 1  Percent debrided: 100%  Surface Area (cm^2): 0 2  Area debrided (cm^2): 0 2  Volume (cm^3): 0 02  Devitalized tissue debrided: biofilm, fibrin and slough  Instrument(s) utilized: curette  Bleeding: small  Hemostasis obtained with: pressure  Procedural pain (0-10): 0  Post-procedural pain: 0   Response to treatment: procedure was tolerated well          Plan:  1  F/u visit  Wound debrided  Wound improving and measuring smaller  Will change treatment to Dermagran gauze covered with dry gauze and oliver changed 3x per week  Patient is to start wearing a Spandagrip stocking for gentle compression therapy daily  Patient will follow up in 2 weeks  Wound 12/15/21 Venous Ulcer Pretibial Distal;Right (Active)   Wound Image Images linked 01/28/22 1434   Wound Description Granulation tissue;Pink;Yellow;Slough 01/28/22 1437   Angelika-wound Assessment Intact; Hyperpigmented;Dry;Scaly 01/28/22 1437   Wound Length (cm) 0 5 cm 01/28/22 1437   Wound Width (cm) 0 4 cm 01/28/22 1437   Wound Depth (cm) 0 1 cm 01/28/22 1437   Wound Surface Area (cm^2) 0 2 cm^2 01/28/22 1437   Wound Volume (cm^3) 0 02 cm^3 01/28/22 1437   Calculated Wound Volume (cm^3) 0 02 cm^3 01/28/22 1437   Change in Wound Size % 95 24 01/28/22 1437   Drainage Amount Small 01/28/22 1437   Drainage Description Serosanguineous 01/28/22 1437   Non-staged Wound Description Full thickness 01/28/22 1437   Dressing Status Intact 01/28/22 1437       Wound 12/15/21 Venous Ulcer Pretibial Distal;Right (Active)   Date First Assessed/Time First Assessed: 12/15/21 0835   Pre-Existing Wound: No  Primary Wound Type: Venous Ulcer  Location: Pretibial  Wound Location Orientation: Distal;Right       Subjective:     F/u visit venous ulcer of RLE  Per patient's chart, patient has been hospitalized twice since he was last seen by Dr Jeaneth Lincoln on 1/3/22 for RLE cellulitis  Patient reports today she is feeling well  She denies any pain, fevers, or chills          The following portions of the patient's history were reviewed and updated as appropriate:   She  has a past medical history of Hypertension and Ulcer of right shin ([facility] )  She   Patient Active Problem List    Diagnosis Date Noted    Thrombophlebitis 01/26/2022    Diarrhea 01/13/2022    COVID-19 01/11/2022    Elevated liver enzymes 01/11/2022    Hypokalemia 01/11/2022    Sepsis ([facility] ) 01/11/2022    Chronic venous hypertension (idiopathic) with ulcer of right lower extremity ([facility] ) 01/03/2022    Venous ulcer of right lower extremity with varicose veins ([place] [address] ) 12/27/2021    Primary hypertension 12/22/2021    Cellulitis of right anterior lower leg 12/15/2021     She  has no past surgical history on file  Her family history includes No Known Problems in her brother, cousin, daughter, father, maternal aunt, maternal grandfather, maternal grandmother, maternal uncle, mother, paternal aunt, paternal grandfather, paternal grandmother, paternal uncle, sister, and son  She  reports that she has never smoked  She has never used smokeless tobacco  She reports previous alcohol use  She reports that she does not use drugs  Current Outpatient Medications   Medication Sig Dispense Refill    Acetaminophen 325 MG CAPS Take 3 capsules (975 mg total) by mouth every 8 (eight) hours as needed (mild pain) 30 capsule 0    oxyCODONE (ROXICODONE) 5 immediate release tablet Take 1 tablet (5 mg total) by mouth every 6 (six) hours as needed for moderate pain for up to 10 days Max Daily Amount: 20 mg 10 tablet 0     No current facility-administered medications for this visit  She is allergic to wound dressing adhesive       Review of Systems   Constitutional: Negative  HENT: Negative for ear pain and hearing loss  Eyes: Negative for pain  Respiratory: Negative for chest tightness and shortness of breath  Cardiovascular: Positive for leg swelling  Negative for chest pain and palpitations  Gastrointestinal: Negative for diarrhea, nausea and vomiting     Genitourinary: Negative for dysuria  Musculoskeletal: Negative for gait problem  Skin: Positive for wound  Neurological: Negative for tremors and weakness  Psychiatric/Behavioral: Negative for behavioral problems, confusion and suicidal ideas  Objective:       Wound 12/15/21 Venous Ulcer Pretibial Distal;Right (Active)   Wound Image Images linked 01/28/22 1434   Wound Description Granulation tissue;Pink;Yellow;Slough 01/28/22 1437   Angelika-wound Assessment Intact; Hyperpigmented;Dry;Scaly 01/28/22 1437   Wound Length (cm) 0 5 cm 01/28/22 1437   Wound Width (cm) 0 4 cm 01/28/22 1437   Wound Depth (cm) 0 1 cm 01/28/22 1437   Wound Surface Area (cm^2) 0 2 cm^2 01/28/22 1437   Wound Volume (cm^3) 0 02 cm^3 01/28/22 1437   Calculated Wound Volume (cm^3) 0 02 cm^3 01/28/22 1437   Change in Wound Size % 95 24 01/28/22 1437   Drainage Amount Small 01/28/22 1437   Drainage Description Serosanguineous 01/28/22 1437   Non-staged Wound Description Full thickness 01/28/22 1437   Dressing Status Intact 01/28/22 1437       /92   Pulse 87   Temp (!) 96 7 °F (35 9 °C)   Resp 16             Wound Instructions:  Orders Placed This Encounter   Procedures    Wound cleansing and dressings     RLE wound:  Wound care 3x/wk  Keep dressing clean dry and intact on non dressing change days  On dressing change days, remove dressing and shower  Redress directly after shower  Apply dermagran gauze cut to fit the wound  Cover with gauze, secure with rolled gauze  This was applied today  4x4 dermagran gauze given to patient today  Standing Status:   Future     Standing Expiration Date:   1/28/2023    Wound compression and edema control     Elastic Tubular Stocking size F    Tubular elastic bandage: Apply from base of toes to behind the knee  Apply in AM, may remove for sleep  Avoid prolonged standing in one place  Elevate leg(s) above the level of the heart when sitting or as much as possible       This was applied today      Standing Status:   Future     Standing Expiration Date:   1/28/2023    Debridement     This order was created via procedure documentation        Diagnosis ICD-10-CM Associated Orders   1  Chronic venous hypertension (idiopathic) with ulcer of right lower extremity (HCC)  I87 311 lidocaine (LMX) 4 % cream    L97 919 Wound cleansing and dressings     Wound compression and edema control   2   Non-pressure chronic ulcer of right lower leg with fat layer exposed ([facility])  L97 912 lidocaine (LMX) 4 % cream     Wound cleansing and dressings     Wound compression and edema control

## 2022-02-03 NOTE — PROGRESS NOTES
Assessment/Plan:    Doing well now with excellent wound healing of a right anterior tibial ulceration  She has an easily palpable dorsalis pedis pulse, will continue aggressive wound care and she should start to use her graduated compression stocking once the wound is completely healed  Plan:  Follow-up in the office on an as-needed basis     Diagnoses and all orders for this visit:    Chronic venous hypertension (idiopathic) with ulcer of right lower extremity ([address] )        Subjective:      Patient ID: Torito Colby is a 64 y o  female  Patient presents for a f/u after her hospital visit on 1/11/2022  Pt was [facility] the hospital [facility] due to nonhealing R anterior shin ulcer and fever  Pt was placed on Keflex for 14 days  Ulcer on R leg has some drainage and discoloration  Pt sees wound care biweekly, and dressing on wound is changed daily with assistance from patient's daughter  Pt states the pain in her R leg has improved since her LOV  Pt reports swelling in RLE  She is not a smoker  HPI    The following portions of the patient's history were reviewed and updated as appropriate: allergies, current medications, past family history, past medical history, past social history, past surgical history and problem list     Review of Systems   Constitutional: Negative  HENT: Negative  Eyes: Negative  Respiratory: Negative  Cardiovascular: Positive for leg swelling  Gastrointestinal: Negative  Endocrine: Negative  Genitourinary: Negative  Musculoskeletal: Negative  Skin: Positive for wound (R shin ulcer)  Allergic/Immunologic: Negative  Neurological: Negative  Hematological: Negative  Psychiatric/Behavioral: Negative  Objective: There were no vitals taken for this visit           Physical Exam  examine him right leg anterior shin venous ulcer has shown excellent progress of healing very small 8 millimeter x 4 millimeter ulcer with no subcu seen at this time  Her pain level is way down almost asymptomatic, she has easily palpable dorsalis pedis pulse  I have reviewed and made appropriate changes to the review of systems input by the medical assistant  Vitals:    02/07/22 1024   BP: (!) 160/104   BP Location: Left arm   Patient Position: Sitting   Cuff Size: Standard   Pulse: 95   Weight: 93 kg (205 lb)   Height: 5' 2" (1 575 m)       Patient Active Problem List   Diagnosis    Cellulitis of right anterior lower leg    Primary hypertension    Venous ulcer of right lower extremity with varicose veins (HCC)    Chronic venous hypertension (idiopathic) with ulcer of right lower extremity (HCC)    COVID-19    Elevated liver enzymes    Hypokalemia    Sepsis (HCC)    Diarrhea    Thrombophlebitis       No past surgical history on file      Family History   Problem Relation Age of Onset    No Known Problems Mother     No Known Problems Father     No Known Problems Sister     No Known Problems Brother     No Known Problems Son     No Known Problems Daughter     No Known Problems Maternal Grandmother     No Known Problems Maternal Grandfather     No Known Problems Paternal Grandmother     No Known Problems Paternal Grandfather     No Known Problems Maternal Aunt     No Known Problems Maternal Uncle     No Known Problems Paternal Aunt     No Known Problems Paternal Uncle     No Known Problems Cousin        Social History     Socioeconomic History    Marital status: /Civil Union     Spouse name: Not on file    Number of children: Not on file    Years of education: Not on file    Highest education level: Not on file   Occupational History    Not on file   Tobacco Use    Smoking status: Never Smoker    Smokeless tobacco: Never Used   Vaping Use    Vaping Use: Never used   Substance and Sexual Activity    Alcohol use: Not Currently    Drug use: Never    Sexual activity: Not Currently   Other Topics Concern    Not on file   Social History Narrative    Not on file     Social Determinants of Health     Financial Resource Strain: Not on file   Food Insecurity: No Food Insecurity    Worried About Running Out of Food in the Last Year: Never true    Bryce of Food in the Last Year: Never true   Transportation Needs: No Transportation Needs    Lack of Transportation (Medical): No    Lack of Transportation (Non-Medical):  No   Physical Activity: Not on file   Stress: Not on file   Social Connections: Not on file   Intimate Partner Violence: Not on file   Housing Stability: Low Risk     Unable to Pay for Housing in the Last Year: No    Number of Places Lived in the Last Year: 1    Unstable Housing in the Last Year: No       Allergies   Allergen Reactions    Wound Dressing Adhesive Rash         Current Outpatient Medications:     Acetaminophen 325 MG CAPS, Take 3 capsules (975 mg total) by mouth every 8 (eight) hours as needed (mild pain), Disp: 30 capsule, Rfl: 0

## 2022-02-07 ENCOUNTER — OFFICE VISIT (OUTPATIENT)
Dept: VASCULAR SURGERY | Facility: CLINIC | Age: 62
End: 2022-02-07
Payer: COMMERCIAL

## 2022-02-07 VITALS
SYSTOLIC BLOOD PRESSURE: 160 MMHG | DIASTOLIC BLOOD PRESSURE: 104 MMHG | HEART RATE: 95 BPM | HEIGHT: 62 IN | WEIGHT: 205 LBS | BODY MASS INDEX: 37.73 KG/M2

## 2022-02-07 DIAGNOSIS — I87.311 CHRONIC VENOUS HYPERTENSION (IDIOPATHIC) WITH ULCER OF RIGHT LOWER EXTREMITY (HCC): Primary | ICD-10-CM

## 2022-02-07 DIAGNOSIS — L97.919 CHRONIC VENOUS HYPERTENSION (IDIOPATHIC) WITH ULCER OF RIGHT LOWER EXTREMITY (HCC): Primary | ICD-10-CM

## 2022-02-07 PROCEDURE — 99213 OFFICE O/P EST LOW 20 MIN: CPT | Performed by: SURGERY

## 2022-02-07 NOTE — LETTER
February 7, 2022     [address] [facility]  [address] [facility]    Patient: Kenton Chong   YOB: 1960   Date of Visit: 2/7/2022       Dear Dr Eduardo Carbone: Thank you for referring Kenton Chong to me for evaluation  Below are my notes for this consultation  If you have questions, please do not hesitate to call me  I look forward to following your patient along with you           Sincerely,        Matthew Smith MD        CC: No Recipients

## 2022-02-07 NOTE — PATIENT INSTRUCTIONS
Doing well now with excellent wound healing of a right anterior tibial ulceration  She has an easily palpable dorsalis pedis pulse, will continue aggressive wound care and she should start to use her graduated compression stocking once the wound is completely healed      Plan:  Follow-up in the office on an as-needed basis

## 2022-02-11 ENCOUNTER — OFFICE VISIT (OUTPATIENT)
Dept: WOUND CARE | Facility: HOSPITAL | Age: 62
End: 2022-02-11
Payer: COMMERCIAL

## 2022-02-11 VITALS
DIASTOLIC BLOOD PRESSURE: 61 MMHG | RESPIRATION RATE: 16 BRPM | SYSTOLIC BLOOD PRESSURE: 168 MMHG | TEMPERATURE: 97.3 F | HEART RATE: 87 BPM

## 2022-02-11 DIAGNOSIS — I87.311 CHRONIC VENOUS HYPERTENSION (IDIOPATHIC) WITH ULCER OF RIGHT LOWER EXTREMITY (HCC): Primary | ICD-10-CM

## 2022-02-11 DIAGNOSIS — L97.912 NON-PRESSURE CHRONIC ULCER OF RIGHT LOWER LEG WITH FAT LAYER EXPOSED (HCC): ICD-10-CM

## 2022-02-11 DIAGNOSIS — L97.919 CHRONIC VENOUS HYPERTENSION (IDIOPATHIC) WITH ULCER OF RIGHT LOWER EXTREMITY (HCC): Primary | ICD-10-CM

## 2022-02-11 PROCEDURE — 97597 DBRDMT OPN WND 1ST 20 CM/<: CPT | Performed by: NURSE PRACTITIONER

## 2022-02-11 RX ORDER — LIDOCAINE 40 MG/G
CREAM TOPICAL ONCE
Status: COMPLETED | OUTPATIENT
Start: 2022-02-11 | End: 2022-02-11

## 2022-02-11 RX ADMIN — LIDOCAINE: 40 CREAM TOPICAL at 13:27

## 2022-02-11 NOTE — PATIENT INSTRUCTIONS
[facility]    PROGRESS NOTE    Patient:  Gracy Hernandez    :  1991    Date of Service:  3/19/2021      The encounter diagnosis was Persistent depressive disorder.    Data:  Client continues to work through transition and change    Intervention:  [employer]ies    Patient continues to be involved in service planning:  YES    Describe above interventions:  Used active listening and asked open ended questions as client described changing thoughts that support improved overall mood and efficacy. Explored changing relationships and engagement with in-law family. Reflected on decision to sign paperwork to initiate divorce, worked through feelings, and explored options.    Patient's response to interventions:  Client was engaged in session and responsive to interventions. She was thoughtful and introspective throughout.    Continue to support patient's efforts and progress towards established treatment plan goals in the following ways:  Support client as she continues to work through change and transition    Off-site:  No     This visit is being performed via phone to discuss Depression, Stress, and Telephonic Visit    Clinician Location: [facility]    Gracy is in [place] and her identity has been established.   She was informed that consent to treat includes permission to submit charges to the applicable insurance on file. Gracy was advised regarding the potential risk inherent in video visits, as the assessment may be limited due to what can be seen on the screen which potentially results in an incomplete assessment; as well as either of us may discontinue the video visit if it is felt that the videoconferencing connections are not adequate for his/her situation.   53 minutes were spent in this encounter.      This note is not being shared electronically with the patient because, the patient requested that this note not be  Orders Placed This Encounter   Procedures    Wound cleansing and dressings     RLE wound:  Wound care 3x/wk  Keep dressing clean dry and intact on non dressing change days  On dressing change days, remove dressing and shower  Redress directly after shower      Apply dermagran gauze cut to fit the wound  Cover with gauze, secure with rolled gauze  This was applied today               Wound compression and edema control      Elastic Tubular Stocking size      Tubular elastic bandage: Apply from base of toes to behind the knee   Apply in AM, may remove for sleep      Avoid prolonged standing in one place      Elevate leg(s) above the level of the heart when sitting or as much as possible       This was applied today           Standing Status:   Future     Standing Expiration Date:   2/11/2023 displayed in [employer].

## 2022-02-11 NOTE — PROGRESS NOTES
Patient ID: Fernando Shelton is a 64 y o  female Date of Birth 1960     Chief Complaint  Chief Complaint   Patient presents with    Follow Up Wound Care Visit     right leg        Allergies  Wound dressing adhesive    Assessment:     Diagnoses and all orders for this visit:    Chronic venous hypertension (idiopathic) with ulcer of right lower extremity (HCC)  -     lidocaine (LMX) 4 % cream  -     Wound cleansing and dressings; Future  -     Debridement    Non-pressure chronic ulcer of right lower leg with fat layer exposed ([address] )  -     lidocaine (LMX) 4 % cream  -     Wound cleansing and dressings; Future  -     Debridement              Debridement   Wound 12/15/21 Venous Ulcer Pretibial Distal;Right    Universal Protocol:  Consent: Written consent obtained  Consent given by: patient  Time out: Immediately prior to procedure a "time out" was called to verify the correct patient, procedure, equipment, support staff and site/side marked as required  Timeout called at: 2/11/2022 1:56 PM   Patient understanding: patient states understanding of the procedure being performed  Patient consent: the patient's understanding of the procedure matches consent given  Procedure consent matches procedure scheduled: N/A  Relevant documents present and verified: N/A  Test results available and properly labeled: N/A  Site marked: the operative site was marked  Imaging studies available: N/A  Required blood products, implants, devices, and special equipment available: N/A    Patient identity confirmed: verbally with patient      Performed by: NP  Debridement type: selective  Pain control: lidocaine 4%  Pre-debridement measurements  Length (cm): 0 7  Width (cm): 0 4  Depth (cm): 0 1  Surface Area (cm^2): 0 28  Volume (cm^3): 0 03    Post-debridement measurements  Length (cm): 0 7  Width (cm): 0 4  Depth (cm): 0 1  Percent debrided: 100%  Surface Area (cm^2): 0 28  Area debrided (cm^2): 0 28  Volume (cm^3): 0 03  Devitalized tissue debrided: biofilm, fibrin and slough  Instrument(s) utilized: curette  Bleeding: small  Hemostasis obtained with: pressure  Procedural pain (0-10): 0  Post-procedural pain: 0   Response to treatment: procedure was tolerated well          Plan:  1  F/u visit  Wound debrided  Wound measuring slightly smaller  Continue current plan of care  Patient will follow up in 2 weeks  Wound 12/15/21 Venous Ulcer Pretibial Distal;Right (Active)   Wound Image Images linked 02/11/22 1315   Wound Description Brown;Epithelialization;Eschar;Pink 02/11/22 1323   Angelika-wound Assessment Dry; Intact; Hyperpigmented;Scaly 02/11/22 1323   Wound Length (cm) 0 7 cm 02/11/22 1323   Wound Width (cm) 0 4 cm 02/11/22 1323   Wound Depth (cm) 0 1 cm 02/11/22 1323   Wound Surface Area (cm^2) 0 28 cm^2 02/11/22 1323   Wound Volume (cm^3) 0 028 cm^3 02/11/22 1323   Calculated Wound Volume (cm^3) 0 03 cm^3 02/11/22 1323   Change in Wound Size % 92 86 02/11/22 1323   Drainage Amount Scant 02/11/22 1323   Drainage Description Serosanguineous 02/11/22 1323   Non-staged Wound Description Full thickness 02/11/22 1323       Wound 12/15/21 Venous Ulcer Pretibial Distal;Right (Active)   Date First Assessed/Time First Assessed: 12/15/21 0835   Pre-Existing Wound: No  Primary Wound Type: Venous Ulcer  Location: Pretibial  Wound Location Orientation: Distal;Right       Subjective:     F/u visit venous ulcer of LLE  No new complaints  She denies any pain, fevers, or chills  The following portions of the patient's history were reviewed and updated as appropriate:   She  has a past medical history of Hypertension and Ulcer of right shin ([address] )    She   Patient Active Problem List    Diagnosis Date Noted    Thrombophlebitis 01/26/2022    Diarrhea 01/13/2022    COVID-19 01/11/2022    Elevated liver enzymes 01/11/2022    Hypokalemia 01/11/2022    Sepsis ([address] ) 01/11/2022    Chronic venous hypertension (idiopathic) with ulcer of right lower extremity ([address] ) 01/03/2022    Venous ulcer of right lower extremity with varicose veins (HCC) 12/27/2021    Primary hypertension 12/22/2021    Cellulitis of right anterior lower leg 12/15/2021     She  has no past surgical history on file  Her family history includes No Known Problems in her brother, cousin, daughter, father, maternal aunt, maternal grandfather, maternal grandmother, maternal uncle, mother, paternal aunt, paternal grandfather, paternal grandmother, paternal uncle, sister, and son  She  reports that she has never smoked  She has never used smokeless tobacco  She reports previous alcohol use  She reports that she does not use drugs  Current Outpatient Medications   Medication Sig Dispense Refill    Acetaminophen 325 MG CAPS Take 3 capsules (975 mg total) by mouth every 8 (eight) hours as needed (mild pain) 30 capsule 0     No current facility-administered medications for this visit  She is allergic to wound dressing adhesive       Review of Systems   Constitutional: Negative  HENT: Negative for ear pain and hearing loss  Eyes: Negative for pain  Respiratory: Negative for chest tightness and shortness of breath  Cardiovascular: Positive for leg swelling  Negative for chest pain and palpitations  Gastrointestinal: Negative for diarrhea, nausea and vomiting  Genitourinary: Negative for dysuria  Musculoskeletal: Negative for gait problem  Skin: Positive for wound  Neurological: Negative for tremors and weakness  Psychiatric/Behavioral: Negative for behavioral problems, confusion and suicidal ideas  Objective:       Wound 12/15/21 Venous Ulcer Pretibial Distal;Right (Active)   Wound Image Images linked 02/11/22 1315   Wound Description Brown;Epithelialization;Eschar;Pink 02/11/22 1323   Angelika-wound Assessment Dry; Intact; Hyperpigmented;Scaly 02/11/22 1323   Wound Length (cm) 0 7 cm 02/11/22 1323   Wound Width (cm) 0 4 cm 02/11/22 1323   Wound Depth (cm) 0 1 cm 02/11/22 1323   Wound Surface Area (cm^2) 0 28 cm^2 02/11/22 1323   Wound Volume (cm^3) 0 028 cm^3 02/11/22 1323   Calculated Wound Volume (cm^3) 0 03 cm^3 02/11/22 1323   Change in Wound Size % 92 86 02/11/22 1323   Drainage Amount Scant 02/11/22 1323   Drainage Description Serosanguineous 02/11/22 1323   Non-staged Wound Description Full thickness 02/11/22 1323       /61   Pulse 87   Temp (!) 97 3 °F (36 3 °C)   Resp 16             Wound Instructions:  Orders Placed This Encounter   Procedures    Wound cleansing and dressings     RLE wound:  Wound care 3x/wk  Keep dressing clean dry and intact on non dressing change days  On dressing change days, remove dressing and shower  Redress directly after shower      Apply dermagran gauze cut to fit the wound  Cover with gauze, secure with rolled gauze  This was applied today               Wound compression and edema control      Elastic Tubular Stocking size      Tubular elastic bandage: Apply from base of toes to behind the knee  Apply in AM, may remove for sleep      Avoid prolonged standing in one place      Elevate leg(s) above the level of the heart when sitting or as much as possible       This was applied today           Standing Status:   Future     Standing Expiration Date:   2/11/2023    Debridement     This order was created via procedure documentation        Diagnosis ICD-10-CM Associated Orders   1  Chronic venous hypertension (idiopathic) with ulcer of right lower extremity (HCC)  I87 311 lidocaine (LMX) 4 % cream    L97 919 Wound cleansing and dressings     Debridement   2   Non-pressure chronic ulcer of right lower leg with fat layer exposed (HCC)  L97 912 lidocaine (LMX) 4 % cream     Wound cleansing and dressings     Debridement

## 2022-03-04 ENCOUNTER — OFFICE VISIT (OUTPATIENT)
Dept: WOUND CARE | Facility: HOSPITAL | Age: 62
End: 2022-03-04
Payer: COMMERCIAL

## 2022-03-04 VITALS
RESPIRATION RATE: 16 BRPM | TEMPERATURE: 97.9 F | SYSTOLIC BLOOD PRESSURE: 167 MMHG | DIASTOLIC BLOOD PRESSURE: 84 MMHG | HEART RATE: 74 BPM

## 2022-03-04 DIAGNOSIS — L97.912 NON-PRESSURE CHRONIC ULCER OF RIGHT LOWER LEG WITH FAT LAYER EXPOSED (HCC): ICD-10-CM

## 2022-03-04 DIAGNOSIS — L97.919 CHRONIC VENOUS HYPERTENSION (IDIOPATHIC) WITH ULCER OF RIGHT LOWER EXTREMITY (HCC): Primary | ICD-10-CM

## 2022-03-04 DIAGNOSIS — I87.311 CHRONIC VENOUS HYPERTENSION (IDIOPATHIC) WITH ULCER OF RIGHT LOWER EXTREMITY (HCC): Primary | ICD-10-CM

## 2022-03-04 PROCEDURE — 97597 DBRDMT OPN WND 1ST 20 CM/<: CPT | Performed by: NURSE PRACTITIONER

## 2022-03-04 RX ORDER — LIDOCAINE 40 MG/G
CREAM TOPICAL ONCE
Status: COMPLETED | OUTPATIENT
Start: 2022-03-04 | End: 2022-03-04

## 2022-03-04 RX ADMIN — LIDOCAINE 1 APPLICATION: 40 CREAM TOPICAL at 14:35

## 2022-03-04 NOTE — PATIENT INSTRUCTIONS
Orders Placed This Encounter   Procedures    Wound cleansing and dressings     Wound cleansing and dressings   RLE wound:  Wound care 3x/wk  Keep dressing clean dry and intact on non dressing change days  On dressing change days, remove dressing and shower  Redress directly after shower      Apply dermagran gauze cut to fit the wound  Cover with gauze, secure with rolled gauze  This was applied today                           Standing Status:   Future     Standing Expiration Date:   3/4/2023    Wound compression and edema control     Wound compression and edema control   Elastic Tubular Stocking size- Spandagrip size F applied today  May use compression stockings from home 20-30mm HG      Tubular elastic bandage: Apply from base of toes to behind the knee   Apply in AM, may remove for sleep      Avoid prolonged standing in one place      Elevate leg(s) above the level of the heart when sitting or as much as possible       This was applied today                     Standing Status:   Future     Standing Expiration Date:   3/4/2023

## 2022-03-04 NOTE — PROGRESS NOTES
Patient ID: Trisha Jha is a 64 y o  female Date of Birth 1960     Chief Complaint  Chief Complaint   Patient presents with    Follow Up Wound Care Visit     RLE wound       Allergies  Wound dressing adhesive    Assessment:     Diagnoses and all orders for this visit:    Chronic venous hypertension (idiopathic) with ulcer of right lower extremity (HCC)  -     lidocaine (LMX) 4 % cream  -     Wound cleansing and dressings; Future  -     Wound compression and edema control; Future  -     Debridement    Non-pressure chronic ulcer of right lower leg with fat layer exposed ([address] )  -     Wound cleansing and dressings; Future  -     Wound compression and edema control; Future  -     Debridement              Debridement   Wound 12/15/21 Venous Ulcer Pretibial Distal;Right    Universal Protocol:  Consent: Written consent obtained  Consent given by: patient  Time out: Immediately prior to procedure a "time out" was called to verify the correct patient, procedure, equipment, support staff and site/side marked as required  Timeout called at: 3/4/2022 3:03 PM   Patient understanding: patient states understanding of the procedure being performed  Patient consent: the patient's understanding of the procedure matches consent given  Procedure consent matches procedure scheduled: N/A  Relevant documents present and verified: N/A  Test results available and properly labeled: N/A  Site marked: the operative site was marked  Imaging studies available: N/A  Required blood products, implants, devices, and special equipment available: N/A    Patient identity confirmed: verbally with patient      Performed by: NP  Debridement type: selective  Pain control: lidocaine 4%  Pre-debridement measurements  Length (cm): 0 1  Width (cm): 0 1  Depth (cm): 0 1  Surface Area (cm^2): 0 01  Volume (cm^3): 0    Post-debridement measurements  Length (cm): 0 1  Width (cm): 0 1  Depth (cm): 0 1  Percent debrided: 100%  Surface Area (cm^2): 0 01  Area debrided (cm^2): 0 01  Volume (cm^3): 0  Devitalized tissue debrided: biofilm and fibrin  Instrument(s) utilized: curette  Bleeding: small  Hemostasis obtained with: pressure  Procedural pain (0-10): 0  Post-procedural pain: 0   Response to treatment: procedure was tolerated well          Plan:  1  F/U visit  Wound debrided  Wound improving and measuring smaller  Continue current plan of care  Counseled patient to use compression stockings instead of Spandagrip due to higher level of compression  Also counseled patient to try to elevate her legs frequently throughout the day to help reduce her lower extremity edema  Patient verbalized agreement understanding  Patient will followup in 2 weeks  Wound 12/15/21 Venous Ulcer Pretibial Distal;Right (Active)   Wound Image Images linked 03/04/22 1420   Wound Description Epithelialization;Pink 03/04/22 1426   Angelika-wound Assessment Edema; Intact 03/04/22 1426   Wound Length (cm) 0 1 cm 03/04/22 1426   Wound Width (cm) 0 1 cm 03/04/22 1426   Wound Depth (cm) 0 1 cm 03/04/22 1426   Wound Surface Area (cm^2) 0 01 cm^2 03/04/22 1426   Wound Volume (cm^3) 0 001 cm^3 03/04/22 1426   Calculated Wound Volume (cm^3) 0 cm^3 03/04/22 1426   Change in Wound Size % 100 03/04/22 1426   Drainage Amount Scant 03/04/22 1426   Drainage Description Serous 03/04/22 1426   Non-staged Wound Description Full thickness 03/04/22 1426   Treatments Irrigation with NSS 03/04/22 1426       Wound 12/15/21 Venous Ulcer Pretibial Distal;Right (Active)   Date First Assessed/Time First Assessed: 12/15/21 0835   Pre-Existing Wound: No  Primary Wound Type: Venous Ulcer  Location: Pretibial  Wound Location Orientation: Distal;Right       Subjective:     F/U visit for venous ulcer of right lower extremity  No new complaints  She denies any pain, fevers, or chills        The following portions of the patient's history were reviewed and updated as appropriate:   She  has a past medical history of Hypertension and Ulcer of right shin ([address] )  She   Patient Active Problem List    Diagnosis Date Noted    Thrombophlebitis 01/26/2022    Diarrhea 01/13/2022    COVID-19 01/11/2022    Elevated liver enzymes 01/11/2022    Hypokalemia 01/11/2022    Sepsis ([address] ) 01/11/2022    Chronic venous hypertension (idiopathic) with ulcer of right lower extremity ([address] ) 01/03/2022    Venous ulcer of right lower extremity with varicose veins ([address] ) 12/27/2021    Primary hypertension 12/22/2021    Cellulitis of right anterior lower leg 12/15/2021     She  has no past surgical history on file  Her family history includes No Known Problems in her brother, cousin, daughter, father, maternal aunt, maternal grandfather, maternal grandmother, maternal uncle, mother, paternal aunt, paternal grandfather, paternal grandmother, paternal uncle, sister, and son  She  reports that she has never smoked  She has never used smokeless tobacco  She reports previous alcohol use  She reports that she does not use drugs  Current Outpatient Medications   Medication Sig Dispense Refill    Acetaminophen 325 MG CAPS Take 3 capsules (975 mg total) by mouth every 8 (eight) hours as needed (mild pain) 30 capsule 0     No current facility-administered medications for this visit  She is allergic to wound dressing adhesive       Review of Systems   Constitutional: Negative  HENT: Negative for ear pain and hearing loss  Eyes: Negative for pain  Respiratory: Negative for chest tightness and shortness of breath  Cardiovascular: Positive for leg swelling  Negative for chest pain and palpitations  Gastrointestinal: Negative for diarrhea, nausea and vomiting  Genitourinary: Negative for dysuria  Musculoskeletal: Negative for gait problem  Skin: Positive for wound  Neurological: Negative for tremors and weakness  Psychiatric/Behavioral: Negative for behavioral problems, confusion and suicidal ideas           Objective: Wound 12/15/21 Venous Ulcer Pretibial Distal;Right (Active)   Wound Image Images linked 03/04/22 1420   Wound Description Epithelialization;Pink 03/04/22 1426   Angelika-wound Assessment Edema; Intact 03/04/22 1426   Wound Length (cm) 0 1 cm 03/04/22 1426   Wound Width (cm) 0 1 cm 03/04/22 1426   Wound Depth (cm) 0 1 cm 03/04/22 1426   Wound Surface Area (cm^2) 0 01 cm^2 03/04/22 1426   Wound Volume (cm^3) 0 001 cm^3 03/04/22 1426   Calculated Wound Volume (cm^3) 0 cm^3 03/04/22 1426   Change in Wound Size % 100 03/04/22 1426   Drainage Amount Scant 03/04/22 1426   Drainage Description Serous 03/04/22 1426   Non-staged Wound Description Full thickness 03/04/22 1426   Treatments Irrigation with NSS 03/04/22 1426       /84   Pulse 74   Temp 97 9 °F (36 6 °C)   Resp 16             Wound Instructions:  Orders Placed This Encounter   Procedures    Wound cleansing and dressings     Wound cleansing and dressings   RLE wound:  Wound care 3x/wk  Keep dressing clean dry and intact on non dressing change days  On dressing change days, remove dressing and shower  Redress directly after shower      Apply dermagran gauze cut to fit the wound  Cover with gauze, secure with rolled gauze  This was applied today                           Standing Status:   Future     Standing Expiration Date:   3/4/2023    Wound compression and edema control     Wound compression and edema control   Elastic Tubular Stocking size- Spandagrip size F applied today  May use compression stockings from home 20-30mm HG      Tubular elastic bandage: Apply from base of toes to behind the knee   Apply in AM, may remove for sleep      Avoid prolonged standing in one place      Elevate leg(s) above the level of the heart when sitting or as much as possible       This was applied today                     Standing Status:   Future     Standing Expiration Date:   3/4/2023    Debridement     This order was created via procedure documentation Diagnosis ICD-10-CM Associated Orders   1  Chronic venous hypertension (idiopathic) with ulcer of right lower extremity (HCC)  I87 311 lidocaine (LMX) 4 % cream    L97 919 Wound cleansing and dressings     Wound compression and edema control     Debridement   2   Non-pressure chronic ulcer of right lower 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

## 2022-03-07 ENCOUNTER — TELEPHONE (OUTPATIENT)
Dept: WOUND CARE | Facility: HOSPITAL | Age: 62
End: 2022-03-07

## 2022-03-07 NOTE — TELEPHONE ENCOUNTER
Call from patient's daughter stating that Henry Patterson has c/o of increased pain and swelling; no erythema and wound has not deteriorated  Patient was on her feet last week; pain and edema were noticed after same  Patient is reluctant to return to ED  I advised Alma to instruct Henry Patterson to lay down as flat as possible several times a day; if swelling and pain do not improve then it is highly recommended that she return to ED for evaluation of recurrent cellulitis  Alma verbalized thanks and understanding

## 2022-03-18 ENCOUNTER — OFFICE VISIT (OUTPATIENT)
Dept: WOUND CARE | Facility: HOSPITAL | Age: 62
End: 2022-03-18
Payer: COMMERCIAL

## 2022-03-18 VITALS
HEART RATE: 75 BPM | RESPIRATION RATE: 16 BRPM | SYSTOLIC BLOOD PRESSURE: 189 MMHG | DIASTOLIC BLOOD PRESSURE: 94 MMHG | TEMPERATURE: 97.2 F

## 2022-03-18 DIAGNOSIS — I87.311 CHRONIC VENOUS HYPERTENSION (IDIOPATHIC) WITH ULCER OF RIGHT LOWER EXTREMITY (HCC): ICD-10-CM

## 2022-03-18 DIAGNOSIS — L97.912 NON-PRESSURE CHRONIC ULCER OF RIGHT LOWER LEG WITH FAT LAYER EXPOSED (HCC): Primary | ICD-10-CM

## 2022-03-18 DIAGNOSIS — L97.919 CHRONIC VENOUS HYPERTENSION (IDIOPATHIC) WITH ULCER OF RIGHT LOWER EXTREMITY (HCC): ICD-10-CM

## 2022-03-18 PROCEDURE — 99214 OFFICE O/P EST MOD 30 MIN: CPT | Performed by: NURSE PRACTITIONER

## 2022-03-18 PROCEDURE — 97597 DBRDMT OPN WND 1ST 20 CM/<: CPT | Performed by: NURSE PRACTITIONER

## 2022-03-18 RX ORDER — DOXYCYCLINE HYCLATE 100 MG/1
100 CAPSULE ORAL EVERY 12 HOURS SCHEDULED
Qty: 14 CAPSULE | Refills: 0 | Status: SHIPPED | OUTPATIENT
Start: 2022-03-18 | End: 2022-03-25

## 2022-03-18 RX ORDER — LIDOCAINE 40 MG/G
CREAM TOPICAL ONCE
Status: COMPLETED | OUTPATIENT
Start: 2022-03-18 | End: 2022-03-18

## 2022-03-18 RX ADMIN — LIDOCAINE: 40 CREAM TOPICAL at 13:17

## 2022-03-18 NOTE — PATIENT INSTRUCTIONS
Orders Placed This Encounter   Procedures    Wound cleansing and dressings     Wound cleansing and dressings          RLE wound:    Keep dressing clean dry and intact on non dressing change days  On dressing change days, remove dressing and shower  Redress directly after shower      Apply opticell ag to wound  cut to fit the wound  Cover with gauze, secure with rolled gauze  Change every other day   This was applied today                            Wound compression and edema control      Wound compression and edema control   Elastic Tubular Stocking size- Spandagrip size F applied today  May use compression stockings from home 20-30mm HG      Tubular elastic bandage: Apply from base of toes to behind the knee   Apply in AM, may remove for sleep      Avoid prolonged standing in one place      Elevate leg(s) above the level of the heart when sitting or as much as possible       This was applied today                     Standing Status:   Future     Standing Expiration Date:   3/18/2023    Wound miscellaneous orders     Please obtain antibiotic that was sent to your pharmacy today  If you notice increase to redness, swelling, pain, drainage, or fever/chills please be evaluated in the ER     Standing Status:   Future     Standing Expiration Date:   3/18/2023

## 2022-03-18 NOTE — PROGRESS NOTES
Patient ID: Darren Negron is a 64 y o  female Date of Birth 1960     Chief Complaint  Chief Complaint   Patient presents with    Follow Up Wound Care Visit     right leg wound        Allergies  Wound dressing adhesive    Assessment:     Diagnoses and all orders for this visit:    Non-pressure chronic ulcer of right lower leg with fat layer exposed ([address] )  -     Wound cleansing and dressings; Future  -     lidocaine (LMX) 4 % cream  -     Wound miscellaneous orders; Future  -     doxycycline hyclate (VIBRAMYCIN) 100 mg capsule; Take 1 capsule (100 mg total) by mouth every 12 (twelve) hours for 7 days  -     Debridement    Chronic venous hypertension (idiopathic) with ulcer of right lower extremity (HCC)  -     doxycycline hyclate (VIBRAMYCIN) 100 mg capsule; Take 1 capsule (100 mg total) by mouth every 12 (twelve) hours for 7 days  -     Debridement              Debridement   Wound 12/15/21 Venous Ulcer Pretibial Distal;Right    Universal Protocol:  Consent: Written consent obtained  Consent given by: patient  Time out: Immediately prior to procedure a "time out" was called to verify the correct patient, procedure, equipment, support staff and site/side marked as required  Timeout called at: 3/18/2022 1:42 PM   Patient understanding: patient states understanding of the procedure being performed  Patient consent: the patient's understanding of the procedure matches consent given  Procedure consent matches procedure scheduled: N/A  Relevant documents present and verified: N/A  Test results available and properly labeled: N/A  Site marked: the operative site was marked  Imaging studies available: N/A  Required blood products, implants, devices, and special equipment available: N/A    Patient identity confirmed: verbally with patient      Performed by: NP  Debridement type: selective  Pain control: lidocaine 4%  Pre-debridement measurements  Length (cm): 1  Width (cm): 0 5  Depth (cm): 0 1  Surface Area (cm^2): 0 5  Volume (cm^3): 0 05    Post-debridement measurements  Length (cm): 1  Width (cm): 0 5  Depth (cm): 0 1  Percent debrided: 100%  Surface Area (cm^2): 0 5  Area debrided (cm^2): 0 5  Volume (cm^3): 0 05  Devitalized tissue debrided: biofilm, fibrin and slough  Instrument(s) utilized: curette  Bleeding: small  Hemostasis obtained with: pressure  Procedural pain (0-10): 0  Post-procedural pain: 0   Response to treatment: procedure was tolerated well          Plan:  1  F/U visit  Wound debrided  Wound measuring larger today and producing in increased amount of drainage with increased erythema and pain around wound  Will change treatment to silver alginate covered with ABD and Geni changed every other day  Patient may continue to wear her compression stockings since she feels more comfortable wearing them  2  Will prescribed doxycycline 100 mg p o  b i d  x7 days for possible cellulitis  Counseled patient if she notices increased erythema, pain, edema, fevers, or chills she is to be seen in the nearest ED  Patient verbalized agreement understanding  3  Called patient's daughter to update her on plan of care  4  Patient will followup in 1 week  Wound 12/15/21 Venous Ulcer Pretibial Distal;Right (Active)   Wound Image Images linked 03/18/22 1301   Wound Description Pink;Epithelialization;Yellow; Beefy red 03/18/22 1312   Angelika-wound Assessment Edema; Intact; Erythema 03/18/22 1312   Wound Length (cm) 1 cm 03/18/22 1312   Wound Width (cm) 0 5 cm 03/18/22 1312   Wound Depth (cm) 0 1 cm 03/18/22 1312   Wound Surface Area (cm^2) 0 5 cm^2 03/18/22 1312   Wound Volume (cm^3) 0 05 cm^3 03/18/22 1312   Calculated Wound Volume (cm^3) 0 05 cm^3 03/18/22 1312   Change in Wound Size % 88 1 03/18/22 1312   Drainage Amount Moderate 03/18/22 1312   Drainage Description Serosanguineous 03/18/22 1312   Non-staged Wound Description Full thickness 03/18/22 1312       Wound 12/15/21 Venous Ulcer Pretibial Distal;Right (Active) Date First Assessed/Time First Assessed: 12/15/21 0835   Pre-Existing Wound: No  Primary Wound Type: Venous Ulcer  Location: Pretibial  Wound Location Orientation: Distal;Right       Subjective:     F/U visit for venous ulcer of right lower extremity  Patient reports she has been having increased drainage, increased pain, and increased erythema around her wound for the past week  Her daughter has been changing her dressing daily due to the increased amount of drainage  She has also been wearing her own compression stockings instead of the [employer]and[employer] stocking due to feeling more comfortable wearing her own compression stocking  She denies any fevers or chills  The following portions of the patient's history were reviewed and updated as appropriate:   She  has a past medical history of Hypertension and Ulcer of right shin ([facility] )  She   Patient Active Problem List    Diagnosis Date Noted    Thrombophlebitis 01/26/2022    Diarrhea 01/13/2022    COVID-19 01/11/2022    Elevated liver enzymes 01/11/2022    Hypokalemia 01/11/2022    Sepsis ([facility] ) 01/11/2022    Chronic venous hypertension (idiopathic) with ulcer of right lower extremity ([facility] ) 01/03/2022    Venous ulcer of right lower extremity with varicose veins ([facility] ) 12/27/2021    Primary hypertension 12/22/2021    Cellulitis of right anterior lower leg 12/15/2021     She  has no past surgical history on file  Her family history includes No Known Problems in her brother, cousin, daughter, father, maternal aunt, maternal grandfather, maternal grandmother, maternal uncle, mother, paternal aunt, paternal grandfather, paternal grandmother, paternal uncle, sister, and son  She  reports that she has never smoked  She has never used smokeless tobacco  She reports previous alcohol use  She reports that she does not use drugs    Current Outpatient Medications   Medication Sig Dispense Refill    Acetaminophen 325 MG CAPS Take 3 capsules (975 mg total) by mouth every 8 (eight) hours as needed (mild pain) 30 capsule 0    doxycycline hyclate (VIBRAMYCIN) 100 mg capsule Take 1 capsule (100 mg total) by mouth every 12 (twelve) hours for 7 days 14 capsule 0     No current facility-administered medications for this visit  She is allergic to wound dressing adhesive       Review of Systems   Constitutional: Negative  HENT: Negative for ear pain and hearing loss  Eyes: Negative for pain  Respiratory: Negative for chest tightness and shortness of breath  Cardiovascular: Positive for leg swelling  Negative for chest pain and palpitations  Gastrointestinal: Negative for diarrhea, nausea and vomiting  Genitourinary: Negative for dysuria  Musculoskeletal: Negative for gait problem  Skin: Positive for wound  Neurological: Negative for tremors and weakness  Psychiatric/Behavioral: Negative for behavioral problems, confusion and suicidal ideas  Objective:       Wound 12/15/21 Venous Ulcer Pretibial Distal;Right (Active)   Wound Image Images linked 03/18/22 1301   Wound Description Pink;Epithelialization;Yellow; Beefy red 03/18/22 1312   Angelika-wound Assessment Edema; Intact; Erythema 03/18/22 1312   Wound Length (cm) 1 cm 03/18/22 1312   Wound Width (cm) 0 5 cm 03/18/22 1312   Wound Depth (cm) 0 1 cm 03/18/22 1312   Wound Surface Area (cm^2) 0 5 cm^2 03/18/22 1312   Wound Volume (cm^3) 0 05 cm^3 03/18/22 1312   Calculated Wound Volume (cm^3) 0 05 cm^3 03/18/22 1312   Change in Wound Size % 88 1 03/18/22 1312   Drainage Amount Moderate 03/18/22 1312   Drainage Description Serosanguineous 03/18/22 1312   Non-staged Wound Description Full thickness 03/18/22 1312       BP (!) 189/94   Pulse 75   Temp (!) 97 2 °F (36 2 °C)   Resp 16             Wound Instructions:  Orders Placed This Encounter   Procedures    Wound cleansing and dressings     Wound cleansing and dressings          RLE wound:    Keep dressing clean dry and intact on non dressing change days  On dressing change days, remove dressing and shower  Redress directly after shower      Apply opticell ag to wound  cut to fit the wound  Cover with gauze, secure with rolled gauze  Change every other day   This was applied today                            Wound compression and edema control      Wound compression and edema control   Elastic Tubular Stocking size- Spandagrip size F applied today  May use compression stockings from home 20-30mm HG      Tubular elastic bandage: Apply from base of toes to behind the knee  Apply in AM, may remove for sleep      Avoid prolonged standing in one place      Elevate leg(s) above the level of the heart when sitting or as much as possible       This was applied today                     Standing Status:   Future     Standing Expiration Date:   3/18/2023    Wound miscellaneous orders     Please obtain antibiotic that was sent to your pharmacy today  If you notice increase to redness, swelling, pain, drainage, or fever/chills please be evaluated in the ER     Standing Status:   Future     Standing Expiration Date:   3/18/2023    Debridement     This order was created via procedure documentation        Diagnosis ICD-10-CM Associated Orders   1  Non-pressure chronic ulcer of right lower leg with fat layer exposed ([facility])  L97 912 Wound cleansing and dressings     lidocaine (LMX) 4 % cream     Wound miscellaneous orders     doxycycline hyclate (VIBRAMYCIN) 100 mg capsule     Debridement   2   Chronic venous hypertension (idiopathic) with ulcer of right lower extremity ([facility])  I87 311 doxycycline hyclate (VIBRAMYCIN) 100 mg capsule    L97 919 Debridement

## 2022-03-21 ENCOUNTER — TELEPHONE (OUTPATIENT)
Dept: WOUND CARE | Facility: HOSPITAL | Age: 62
End: 2022-03-21

## 2022-03-21 NOTE — TELEPHONE ENCOUNTER
Alma called stating that the alginate stuck to the wound; did not come off even after showering, she had to pull it off  I spoke with Nevaeh Wray who would like Alma to stop using alginate and resume use of dermagran gauze every other day  I advised Alma of same who verbalized understanding; she also commented that she noticed Jessi's edema did not change from last night to this morning

## 2022-03-25 ENCOUNTER — OFFICE VISIT (OUTPATIENT)
Dept: WOUND CARE | Facility: HOSPITAL | Age: 62
End: 2022-03-25
Payer: COMMERCIAL

## 2022-03-25 VITALS
SYSTOLIC BLOOD PRESSURE: 197 MMHG | TEMPERATURE: 97.2 F | DIASTOLIC BLOOD PRESSURE: 92 MMHG | HEART RATE: 88 BPM | RESPIRATION RATE: 16 BRPM

## 2022-03-25 DIAGNOSIS — L97.912 NON-PRESSURE CHRONIC ULCER OF RIGHT LOWER LEG WITH FAT LAYER EXPOSED (HCC): Primary | ICD-10-CM

## 2022-03-25 DIAGNOSIS — I87.311 CHRONIC VENOUS HYPERTENSION (IDIOPATHIC) WITH ULCER OF RIGHT LOWER EXTREMITY (HCC): ICD-10-CM

## 2022-03-25 DIAGNOSIS — L97.919 CHRONIC VENOUS HYPERTENSION (IDIOPATHIC) WITH ULCER OF RIGHT LOWER EXTREMITY (HCC): ICD-10-CM

## 2022-03-25 PROCEDURE — 99212 OFFICE O/P EST SF 10 MIN: CPT | Performed by: NURSE PRACTITIONER

## 2022-03-25 PROCEDURE — 99213 OFFICE O/P EST LOW 20 MIN: CPT | Performed by: NURSE PRACTITIONER

## 2022-03-25 NOTE — PROGRESS NOTES
Patient ID: Nona Jaimes is a 64 y o  female Date of Birth 1960     Chief Complaint  Chief Complaint   Patient presents with    Follow Up Wound Care Visit     RLE       Allergies  Wound dressing adhesive    Assessment:     Diagnoses and all orders for this visit:    Non-pressure chronic ulcer of right lower leg with fat layer exposed ([facility] )  -     Wound cleansing and dressings; Future  -     Wound compression and edema control; Future    Chronic venous hypertension (idiopathic) with ulcer of right lower extremity ([address] )          Plan:  1  F/u visit  Wound cleansed with NSS and gauze  Wound improving and periwound erythema has resolved  Patient still c/o pain  Recommend patient continue to take tylenol/ibuprofen PRN and to use a cold compresses PRN for pain relief  Also counseled patient to try to elevate her legs more frequently to help relieve pain and to help reduce her LE edema  Patient is to continue to wear her compression stockings for compression therapy daily  Patient verbalized agreement and understanding with plan of care  Patient will follow up in 1 week  Wound 12/15/21 Venous Ulcer Pretibial Distal;Right (Active)   Wound Image Images linked 03/25/22 1424   Wound Description Epithelialization; Beefy red;Pink 03/25/22 1428   Angelika-wound Assessment Edema; Hyperpigmented;Scar Tissue 03/25/22 1428   Wound Length (cm) 1 2 cm 03/25/22 1428   Wound Width (cm) 0 2 cm 03/25/22 1428   Wound Depth (cm) 0 1 cm 03/25/22 1428   Wound Surface Area (cm^2) 0 24 cm^2 03/25/22 1428   Wound Volume (cm^3) 0 024 cm^3 03/25/22 1428   Calculated Wound Volume (cm^3) 0 02 cm^3 03/25/22 1428   Change in Wound Size % 95 24 03/25/22 1428   Drainage Amount Scant 03/25/22 1428   Drainage Description Serosanguineous 03/25/22 1428   Non-staged Wound Description Full thickness 03/25/22 1428   Dressing Status Intact 03/25/22 1428       Wound 12/15/21 Venous Ulcer Pretibial Distal;Right (Active)   Date First Assessed/Time First Assessed: 12/15/21 0835   Pre-Existing Wound: No  Primary Wound Type: Venous Ulcer  Location: Pretibial  Wound Location Orientation: Distal;Right       Subjective:     F/u visit venous ulcer of RLE  Patient is still c/o intense pain in the area of her wound  She describes the pain as a constant burning that is not relieved  She reports she takes tylenol/ibuprofen Q4H PRN which does not help  She has been wearing her compression stockings daily  She tries to elevate her legs as often as possible, however 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  She denies any radiation of her pain into her foot or up her leg  She completed the antibiotic I prescribed for her as instructed  She has been applying Dermagran gauze to her wound as recommended  She denies any fevers or chills  The following portions of the patient's history were reviewed and updated as appropriate:   She  has a past medical history of Hypertension and Ulcer of right shin ([facility] )  She   Patient Active Problem List    Diagnosis Date Noted    Thrombophlebitis 01/26/2022    Diarrhea 01/13/2022    COVID-19 01/11/2022    Elevated liver enzymes 01/11/2022    Hypokalemia 01/11/2022    Sepsis ([facility] ) 01/11/2022    Chronic venous hypertension (idiopathic) with ulcer of right lower extremity ([facility] ) 01/03/2022    Venous ulcer of right lower extremity with varicose veins ([facility] ) 12/27/2021    Primary hypertension 12/22/2021    Cellulitis of right anterior lower leg 12/15/2021     She  has no past surgical history on file  Her family history includes No Known Problems in her brother, cousin, daughter, father, maternal aunt, maternal grandfather, maternal grandmother, maternal uncle, mother, paternal aunt, paternal grandfather, paternal grandmother, paternal uncle, sister, and son  She  reports that she has never smoked  She has never used smokeless tobacco  She reports previous alcohol use   She reports that she does not use drugs   Current Outpatient Medications   Medication Sig Dispense Refill    Acetaminophen 325 MG CAPS Take 3 capsules (975 mg total) by mouth every 8 (eight) hours as needed (mild pain) 30 capsule 0    doxycycline hyclate (VIBRAMYCIN) 100 mg capsule Take 1 capsule (100 mg total) by mouth every 12 (twelve) hours for 7 days 14 capsule 0     No current facility-administered medications for this visit  She is allergic to wound dressing adhesive       Review of Systems   Constitutional: Negative  HENT: Negative for ear pain and hearing loss  Eyes: Negative for pain  Respiratory: Negative for chest tightness and shortness of breath  Cardiovascular: Positive for leg swelling  Negative for chest pain and palpitations  Gastrointestinal: Negative for diarrhea, nausea and vomiting  Genitourinary: Negative for dysuria  Musculoskeletal: Negative for gait problem  Skin: Positive for wound  Neurological: Negative for tremors and weakness  Psychiatric/Behavioral: Negative for behavioral problems, confusion and suicidal ideas  Objective:       Wound 12/15/21 Venous Ulcer Pretibial Distal;Right (Active)   Wound Image Images linked 03/25/22 1424   Wound Description Epithelialization; Beefy red;Pink 03/25/22 1428   Angelika-wound Assessment Edema; Hyperpigmented;Scar Tissue 03/25/22 1428   Wound Length (cm) 1 2 cm 03/25/22 1428   Wound Width (cm) 0 2 cm 03/25/22 1428   Wound Depth (cm) 0 1 cm 03/25/22 1428   Wound Surface Area (cm^2) 0 24 cm^2 03/25/22 1428   Wound Volume (cm^3) 0 024 cm^3 03/25/22 1428   Calculated Wound Volume (cm^3) 0 02 cm^3 03/25/22 1428   Change in Wound Size % 95 24 03/25/22 1428   Drainage Amount Scant 03/25/22 1428   Drainage Description Serosanguineous 03/25/22 1428   Non-staged Wound Description Full thickness 03/25/22 1428   Dressing Status Intact 03/25/22 1428       BP (!) 197/92   Pulse 88   Temp (!) 97 2 °F (36 2 °C)   Resp 16     Physical Exam  Vitals and nursing note reviewed  Constitutional:       General: She is not in acute distress  Appearance: Normal appearance  She is obese  HENT:      Head: Normocephalic and atraumatic  Eyes:      General:         Right eye: No discharge  Left eye: No discharge  Pulmonary:      Effort: Pulmonary effort is normal  No respiratory distress  Musculoskeletal:         General: Normal range of motion  Cervical back: Normal range of motion and neck supple  No rigidity  Right lower le+ Edema present  Skin:     General: Skin is warm and dry  Findings: Wound present  No erythema  Neurological:      General: No focal deficit present  Mental Status: She is alert and oriented to person, place, and time  Mental status is at baseline  Psychiatric:         Mood and Affect: Mood normal          Behavior: Behavior normal          Thought Content: Thought content normal          Judgment: Judgment normal                    Wound Instructions:  Orders Placed This Encounter   Procedures    Wound cleansing and dressings     Wound cleansing and dressings          RLE wound:     Keep dressing clean dry and intact on non dressing change days  On dressing change days, remove dressing and shower  Redress directly after shower      Apply Dermagran gauze cut to fit wound  Cover with gauze, secure with rolled gauze  Change every other day     This was applied today                                                Standing Status:   Future     Standing Expiration Date:   3/25/2023    Wound compression and edema control     Wound compression and edema control   Elastic Tubular Stocking size- Spandagrip size F applied today  May use compression stockings from home 20-30mm HG      Tubular elastic bandage: Apply from base of toes to behind the knee  Apply in AM, may remove for sleep      Avoid prolonged standing in one place      Elevate leg(s) above the level of the heart when sitting or as much as possible  Standing Status:   Future     Standing Expiration Date:   3/25/2023        Diagnosis ICD-10-CM Associated Orders   1  Non-pressure chronic ulcer of 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

## 2022-03-25 NOTE — PATIENT INSTRUCTIONS
Orders Placed This Encounter   Procedures    Wound cleansing and dressings     Wound cleansing and dressings          RLE wound:     Keep dressing clean dry and intact on non dressing change days  On dressing change days, remove dressing and shower  Redress directly after shower      Apply Dermagran gauze cut to fit wound  Cover with gauze, secure with rolled gauze  Change every other day     This was applied today                                                Standing Status:   Future     Standing Expiration Date:   3/25/2023    Wound compression and edema control     Wound compression and edema control   Elastic Tubular Stocking size- Spandagrip size F applied today  May use compression stockings from home 20-30mm HG      Tubular elastic bandage: Apply from base of toes to behind the knee  Apply in AM, may remove for sleep      Avoid prolonged standing in one place      Elevate leg(s) above the level of the heart when sitting or as much as possible       Standing Status:   Future     Standing Expiration Date:   3/25/2023

## 2022-04-01 ENCOUNTER — OFFICE VISIT (OUTPATIENT)
Dept: WOUND CARE | Facility: HOSPITAL | Age: 62
End: 2022-04-01
Payer: COMMERCIAL

## 2022-04-01 VITALS
DIASTOLIC BLOOD PRESSURE: 91 MMHG | TEMPERATURE: 97 F | SYSTOLIC BLOOD PRESSURE: 183 MMHG | HEART RATE: 77 BPM | RESPIRATION RATE: 16 BRPM

## 2022-04-01 DIAGNOSIS — L97.919 CHRONIC VENOUS HYPERTENSION (IDIOPATHIC) WITH ULCER OF RIGHT LOWER EXTREMITY (HCC): ICD-10-CM

## 2022-04-01 DIAGNOSIS — L97.912 NON-PRESSURE CHRONIC ULCER OF RIGHT LOWER LEG WITH FAT LAYER EXPOSED (HCC): Primary | ICD-10-CM

## 2022-04-01 DIAGNOSIS — I87.311 CHRONIC VENOUS HYPERTENSION (IDIOPATHIC) WITH ULCER OF RIGHT LOWER EXTREMITY (HCC): ICD-10-CM

## 2022-04-01 PROCEDURE — 99213 OFFICE O/P EST LOW 20 MIN: CPT | Performed by: NURSE PRACTITIONER

## 2022-04-01 PROCEDURE — 99212 OFFICE O/P EST SF 10 MIN: CPT | Performed by: NURSE PRACTITIONER

## 2022-04-01 NOTE — PATIENT INSTRUCTIONS
Orders Placed This Encounter   Procedures    Wound cleansing and dressings     RLE wound:     Keep dressing clean dry and intact on non dressing change days  On dressing change days, remove dressing and shower  Redress directly after shower      Apply Dermagran gauze cut to fit wound  Cover with gauze, secure with rolled gauze  Change every other day      This was applied today              Standing Status:   Future     Standing Expiration Date:   4/1/2023    Wound compression and edema control      Wound compression and edema control   Elastic Tubular Stocking size- Spandagrip size F applied today  May use compression stockings from home 20-30mm HG      Tubular elastic bandage: Apply from base of toes to behind the knee  Apply in AM, may remove for sleep      Avoid prolonged standing in one place      Elevate leg(s) above the level of the heart when sitting or as much as possible       Standing Status:   Future     Standing Expiration Date:   4/1/2023

## 2022-04-04 NOTE — PROGRESS NOTES
Patient ID: Ninfa Chaudhari is a 64 y o  female Date of Birth 1960     Chief Complaint  Chief Complaint   Patient presents with    Follow Up Wound Care Visit     RLE       Allergies  Wound dressing adhesive    Assessment:     Diagnoses and all orders for this visit:    Non-pressure chronic ulcer of right lower leg with fat layer exposed ([address] )  -     Wound cleansing and dressings; Future  -     Wound compression and edema control; Future    Chronic venous hypertension (idiopathic) with ulcer of right lower extremity ([address] )          Plan:  1  F/U visit  Wound cleansed normal saline and gauze  Wound almost healed  Continue current plan of care  Patient will followup in 2 weeks  Wound 12/15/21 Venous Ulcer Pretibial Distal;Right (Active)   Wound Image Images linked 04/01/22 1529   Wound Description Pink 04/01/22 1529   Angelika-wound Assessment Edema; Hyperpigmented;Scar Tissue 04/01/22 1529   Wound Length (cm) 0 3 cm 04/01/22 1529   Wound Width (cm) 0 2 cm 04/01/22 1529   Wound Depth (cm) 0 1 cm 04/01/22 1529   Wound Surface Area (cm^2) 0 06 cm^2 04/01/22 1529   Wound Volume (cm^3) 0 006 cm^3 04/01/22 1529   Calculated Wound Volume (cm^3) 0 01 cm^3 04/01/22 1529   Change in Wound Size % 97 62 04/01/22 1529   Drainage Amount Scant 04/01/22 1529   Drainage Description Serous; Yellow 04/01/22 1529   Non-staged Wound Description Full thickness 04/01/22 1529   Dressing Status Intact 04/01/22 1529       Wound 12/15/21 Venous Ulcer Pretibial Distal;Right (Active)   Date First Assessed/Time First Assessed: 12/15/21 0835   Pre-Existing Wound: No  Primary Wound Type: Venous Ulcer  Location: Pretibial  Wound Location Orientation: Distal;Right       Subjective:     F/U visit for venous ulcer of right lower extremity  No new complaints  She denies any pain, fevers, or chills        The following portions of the patient's history were reviewed and updated as appropriate:   She  has a past medical history of Hypertension and Ulcer of right shin ([facility] )  She   Patient Active Problem List    Diagnosis Date Noted    Thrombophlebitis 01/26/2022    Diarrhea 01/13/2022    COVID-19 01/11/2022    Elevated liver enzymes 01/11/2022    Hypokalemia 01/11/2022    Sepsis ([facility] ) 01/11/2022    Chronic venous hypertension (idiopathic) with ulcer of right lower extremity ([facility] ) 01/03/2022    Venous ulcer of right lower extremity with varicose veins ([facility] ) 12/27/2021    Primary hypertension 12/22/2021    Cellulitis of right anterior lower leg 12/15/2021     She  has no past surgical history on file  Her family history includes No Known Problems in her brother, cousin, daughter, father, maternal aunt, maternal grandfather, maternal grandmother, maternal uncle, mother, paternal aunt, paternal grandfather, paternal grandmother, paternal uncle, sister, and son  She  reports that she has never smoked  She has never used smokeless tobacco  She reports previous alcohol use  She reports that she does not use drugs  Current Outpatient Medications   Medication Sig Dispense Refill    Acetaminophen 325 MG CAPS Take 3 capsules (975 mg total) by mouth every 8 (eight) hours as needed (mild pain) 30 capsule 0     No current facility-administered medications for this visit  She is allergic to wound dressing adhesive       Review of Systems   Constitutional: Negative  HENT: Negative for ear pain and hearing loss  Eyes: Negative for pain  Respiratory: Negative for chest tightness and shortness of breath  Cardiovascular: Positive for leg swelling  Negative for chest pain and palpitations  Gastrointestinal: Negative for diarrhea, nausea and vomiting  Genitourinary: Negative for dysuria  Musculoskeletal: Negative for gait problem  Skin: Positive for wound  Neurological: Negative for tremors and weakness  Psychiatric/Behavioral: Negative for behavioral problems, confusion and suicidal ideas           Objective:       Wound 12/15/21 Venous Ulcer Pretibial Distal;Right (Active)   Wound Image Images linked 04/01/22 1529   Wound Description Pink 04/01/22 1529   Angelika-wound Assessment Edema; Hyperpigmented;Scar Tissue 04/01/22 1529   Wound Length (cm) 0 3 cm 04/01/22 1529   Wound Width (cm) 0 2 cm 04/01/22 1529   Wound Depth (cm) 0 1 cm 04/01/22 1529   Wound Surface Area (cm^2) 0 06 cm^2 04/01/22 1529   Wound Volume (cm^3) 0 006 cm^3 04/01/22 1529   Calculated Wound Volume (cm^3) 0 01 cm^3 04/01/22 1529   Change in Wound Size % 97 62 04/01/22 1529   Drainage Amount Scant 04/01/22 1529   Drainage Description Serous; Yellow 04/01/22 1529   Non-staged Wound Description Full thickness 04/01/22 1529   Dressing Status Intact 04/01/22 1529       BP (!) 183/91   Pulse 77   Temp (!) 97 °F (36 1 °C)   Resp 16     Physical Exam  Vitals and nursing note reviewed  Constitutional:       General: She is not in acute distress  Appearance: Normal appearance  She is obese  HENT:      Head: Normocephalic and atraumatic  Eyes:      General:         Right eye: No discharge  Left eye: No discharge  Pulmonary:      Effort: Pulmonary effort is normal  No respiratory distress  Musculoskeletal:         General: Normal range of motion  Cervical back: Normal range of motion and neck supple  No rigidity  Right lower leg: No edema  Left lower leg: No edema  Skin:     General: Skin is warm and dry  Findings: Wound present  No erythema  Neurological:      General: No focal deficit present  Mental Status: She is alert and oriented to person, place, and time  Mental status is at baseline  Psychiatric:         Mood and Affect: Mood normal          Behavior: Behavior normal          Thought Content:  Thought content normal          Judgment: Judgment normal                     Wound Instructions:  Orders Placed This Encounter   Procedures    Wound cleansing and dressings     RLE wound:     Keep dressing clean dry and intact on non dressing change days  On dressing change days, remove dressing and shower  Redress directly after shower      Apply Dermagran gauze cut to fit wound  Cover with gauze, secure with rolled gauze  Change every other day      This was applied today              Standing Status:   Future     Standing Expiration Date:   4/1/2023    Wound compression and edema control      Wound compression and edema control   Elastic Tubular Stocking size- Spandagrip size F applied today  May use compression stockings from home 20-30mm HG      Tubular elastic bandage: Apply from base of toes to behind the knee  Apply in AM, may remove for sleep      Avoid prolonged standing in one place      Elevate leg(s) above the level of the heart when sitting or as much as possible  Standing Status:   Future     Standing Expiration Date:   4/1/2023        Diagnosis ICD-10-CM Associated Orders   1  Non-pressure chronic ulcer of right lower leg with fat layer exposed ([facility] )  L93 914 Wound cleansing and dressings     Wound compression and edema control   2   Chronic venous hypertension (idiopathic) with ulcer of right lower extremity ([facility] )  I87 311     L99 914

## 2022-04-19 ENCOUNTER — TELEPHONE (OUTPATIENT)
Dept: WOUND CARE | Facility: HOSPITAL | Age: 62
End: 2022-04-19

## 2022-04-19 NOTE — TELEPHONE ENCOUNTER
Returned call to patient's daughter  She states that patient now has 2 wounds and periwound areas are becoming erythematous  I offered an 830am appt tomorrow with Fonda Goodpasture PA; Juan Pablo lanier

## 2022-04-20 ENCOUNTER — OFFICE VISIT (OUTPATIENT)
Dept: WOUND CARE | Facility: HOSPITAL | Age: 62
End: 2022-04-20
Payer: COMMERCIAL

## 2022-04-20 VITALS
HEART RATE: 77 BPM | TEMPERATURE: 97.5 F | SYSTOLIC BLOOD PRESSURE: 194 MMHG | DIASTOLIC BLOOD PRESSURE: 94 MMHG | RESPIRATION RATE: 18 BRPM

## 2022-04-20 DIAGNOSIS — I87.311 CHRONIC VENOUS HYPERTENSION (IDIOPATHIC) WITH ULCER OF RIGHT LOWER EXTREMITY (HCC): ICD-10-CM

## 2022-04-20 DIAGNOSIS — L97.919 CHRONIC VENOUS HYPERTENSION (IDIOPATHIC) WITH ULCER OF RIGHT LOWER EXTREMITY (HCC): ICD-10-CM

## 2022-04-20 DIAGNOSIS — L97.912 NON-PRESSURE CHRONIC ULCER OF RIGHT LOWER LEG WITH FAT LAYER EXPOSED (HCC): Primary | ICD-10-CM

## 2022-04-20 PROCEDURE — 99213 OFFICE O/P EST LOW 20 MIN: CPT | Performed by: ORTHOPAEDIC SURGERY

## 2022-04-20 PROCEDURE — 99212 OFFICE O/P EST SF 10 MIN: CPT | Performed by: ORTHOPAEDIC SURGERY

## 2022-04-20 NOTE — PROGRESS NOTES
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 ([address] )  L97 915    2  Chronic venous hypertension (idiopathic) with ulcer of right lower extremity ([facility])  I87 311 Wound cleansing and dressings    L97 152 Wound compression and edema control        Assessment and Plan :   RLE Venous ulcer slowly improving  Erythema and pain most likely due to phlebitis  Counseled on decreasing edema with frequent elevation and if possible to stay home for a week  Pt works in [employer] and is on her feet all the time  Daughter agreed to take 5 days off to help in the bakery so that mother can rest and elevate her legs for increased healing  No signs of infection today  Will reassess in 2 weeks   Continue wound management with Dermagran, see wound orders below    Continue compression with Tubigrip   No harsh cleansers such as alcohol, peroxide, or antibacterial soap, do not submerge in water   Can cleanse with mild soap and water at dressing changes   Counseled on importance of frequent elevation of leg to decrease edema   Counseled on eating protein (3-4 servings per day) and the importance of loosing weight to increase healing potential  Patient shows understanding   Followup in 2 weeks or call sooner with questions or concerns      Subjective:   Patient presents for followup of RLE venous ulcer   Pt c/o increased pain and erythema on periwound  Pt  has been using Dermagran on the wound bed and Tubigrip for compression   Patient admits she has not been elevating her legs  Pt denies any sob, fatigue, N/V, CP, fever, chills or calf pain  Pt is accompanied by her daughter who is actively involved in her care          The following portions of the patient's history were reviewed and updated as appropriate:   Patient Active Problem List   Diagnosis    Cellulitis of right anterior lower leg    Primary hypertension    Venous ulcer of right lower extremity with varicose veins (HCC)    Chronic venous hypertension (idiopathic) with ulcer of right lower extremity (HCC)    COVID-19    Elevated liver enzymes    Hypokalemia    Seps[facility] )    Diarrhea    Thrombophlebitis     Past Medical History:   Diagnosis Date    Hypertension     Ulcer of right shin ([facility] )      No past surgical history on file    Family History   Problem Relation Age of Onset    No Known Problems Mother     No Known Problems Father     No Known Problems Sister     No Known Problems Brother     No Known Problems Son     No Known Problems Daughter     No Known Problems Maternal Grandmother     No Known Problems Maternal Grandfather     No Known Problems Paternal Grandmother     No Known Problems Paternal Grandfather     No Known Problems Maternal Aunt     No Known Problems Maternal Uncle     No Known Problems Paternal Aunt     No Known Problems Paternal Uncle     No Known Problems Cousin      Social History     Socioeconomic History    Marital status: /Civil Union     Spouse name: Not on file    Number of children: Not on file    Years of education: Not on file    Highest education level: Not on file   Occupational History    Not on file   Tobacco Use    Smoking status: Never Smoker    Smokeless tobacco: Never Used   Vaping Use    Vaping Use: Never used   Substance and Sexual Activity    Alcohol use: Not Currently    Drug use: Never    Sexual activity: Not Currently   Other Topics Concern    Not on file   Social History Narrative    Not on file     Social Determinants of Health     Financial Resource Strain: Not on file   Food Insecurity: No Food Insecurity    Worried About Running Out of Food in the Last Year: Never true    Bryce of Food in the Last Year: Never true   Transportation Needs: No Transportation Needs    Lack of Transportation (Medical): No    Lack of Transportation (Non-Medical): No   Physical Activity: Not on file   Stress: Not on file   Social Connections: Not on file   Intimate Partner Violence: Not on file   Housing Stability: Low Risk     Unable to Pay for Housing in the Last Year: No    Number of Places Lived in the Last Year: 1    Unstable Housing in the Last Year: No       Current Outpatient Medications:     Acetaminophen 325 MG CAPS, Take 3 capsules (975 mg total) by mouth every 8 (eight) hours as needed (mild pain), Disp: 30 capsule, Rfl: 0    Review of Systems   Constitutional: Negative  HENT: Negative for ear pain and hearing loss  Eyes: Negative for pain  Respiratory: Negative for chest tightness and shortness of breath  Cardiovascular: Positive for leg swelling  Negative for chest pain and palpitations  Gastrointestinal: Negative for diarrhea, nausea and vomiting  Genitourinary: Negative for dysuria  Musculoskeletal: Negative for gait problem  Skin: Positive for wound (RLE)  Neurological: Negative for tremors and weakness  Psychiatric/Behavioral: Negative for behavioral problems, confusion and suicidal ideas  Objective:  BP (!) 194/94   Pulse 77   Temp 97 5 °F (36 4 °C)   Resp 18   Pain Score:   6     Physical Exam  Vitals reviewed  Constitutional:       General: She is not in acute distress  Appearance: Normal appearance  She is well-developed  She is obese  HENT:      Head: Normocephalic and atraumatic  Cardiovascular:      Rate and Rhythm: Normal rate  Pulmonary:      Effort: Pulmonary effort is normal    Musculoskeletal:         General: No deformity  Right lower leg: Edema present  Left lower leg: No edema  Skin:     General: Skin is warm and dry  Findings: Wound (RLE Venous ulcer) present        Comments: +edema, +hyperpigmented periwound, pink granulated tissue  See wound assessment   Neurological:      General: No focal deficit present  Mental Status: She is alert and oriented to person, place, and time  Gait: Gait normal    Psychiatric:         Mood and Affect: Mood and affect normal          Behavior: Behavior normal  Behavior is cooperative  Wound 12/15/21 Venous Ulcer Pretibial Distal;Right (Active)   Wound Image Images linked 04/20/22 0827   Wound Description Pink 04/20/22 0833   Angelika-wound Assessment Edema; Hyperpigmented;Scar Tissue 04/20/22 0833   Wound Length (cm) 1 2 cm 04/20/22 0833   Wound Width (cm) 0 4 cm 04/20/22 0833   Wound Depth (cm) 0 1 cm 04/20/22 0833   Wound Surface Area (cm^2) 0 48 cm^2 04/20/22 0833   Wound Volume (cm^3) 0 048 cm^3 04/20/22 0833   Calculated Wound Volume (cm^3) 0 05 cm^3 04/20/22 0833   Change in Wound Size % 88 1 04/20/22 0833   Drainage Amount Scant 04/20/22 3457   Drainage Description Serous; Yellow 04/20/22 0833   Non-staged Wound Description Full thickness 04/20/22 0833   Dressing Status Intact (upon arrival) 04/20/22 3196       Procedures     Results from last 6 Months   Lab Units 12/15/21  0951   WOUND CULTURE  4+ Growth of Staphylococcus aureus*  3+ Growth of Beta Hemolytic Streptococcus Group C*         Wound Instructions:  Orders Placed This Encounter   Procedures    Wound cleansing and dressings     RLE wound:     Keep dressing clean dry and intact on non dressing change days  On dressing change days, remove dressing and shower  Redress directly after shower      Apply Dermagran gauze cut to fit wound  Cover with gauze, secure with rolled gauze  Change every other day      This was applied today              Standing Status:   Future     Standing Expiration Date:   4/20/2023    Wound compression and edema control      Wound compression and edema control       Wound compression and edema control   Elastic Tubular Stocking size- Spandagrip size E applied today  Due to size F unavailable  May use compression stockings from home 20-30mm HG      Tubular elastic bandage: Apply from base of toes to behind the knee  Apply in AM, may remove for sleep      Avoid prolonged standing in one place      Elevate leg(s) above the level of the heart when sitting or as much as possible     Standing Status:   Future     Standing Expiration Date:   4/20/2023       Maria Alejandra Parham PA-C, [facility]      Portions of the record may have been created with voice recognition software  Occasional wrong word or "sound alike" substitutions may have occurred due to the inherent limitations of voice recognition software  Read the chart carefully and recognize, using context, where substitutions have occurred

## 2022-04-20 NOTE — PATIENT INSTRUCTIONS
Orders Placed This Encounter   Procedures    Wound cleansing and dressings     RLE wound:     Keep dressing clean dry and intact on non dressing change days  On dressing change days, remove dressing and shower  Redress directly after shower      Apply Dermagran gauze cut to fit wound  Cover with gauze, secure with rolled gauze  Change every other day      This was applied today              Standing Status:   Future     Standing Expiration Date:   4/20/2023    Wound compression and edema control      Wound compression and edema control       Wound compression and edema control   Elastic Tubular Stocking size- Spandagrip size E applied today  Due to size F unavailable  May use compression stockings from home 20-30mm HG      Tubular elastic bandage: Apply from base of toes to behind the knee   Apply in AM, may remove for sleep      Avoid prolonged standing in one place      Elevate leg(s) above the level of the heart when sitting or as much as possible     Standing Status:   Future     Standing Expiration Date:   4/20/2023

## 2022-05-04 ENCOUNTER — OFFICE VISIT (OUTPATIENT)
Dept: WOUND CARE | Facility: HOSPITAL | Age: 62
End: 2022-05-04
Payer: COMMERCIAL

## 2022-05-04 VITALS
HEART RATE: 68 BPM | SYSTOLIC BLOOD PRESSURE: 189 MMHG | DIASTOLIC BLOOD PRESSURE: 88 MMHG | RESPIRATION RATE: 18 BRPM | TEMPERATURE: 96.8 F

## 2022-05-04 DIAGNOSIS — L97.919 CHRONIC VENOUS HYPERTENSION (IDIOPATHIC) WITH ULCER OF RIGHT LOWER EXTREMITY (HCC): ICD-10-CM

## 2022-05-04 DIAGNOSIS — L97.912 NON-PRESSURE CHRONIC ULCER OF RIGHT LOWER LEG WITH FAT LAYER EXPOSED (HCC): Primary | ICD-10-CM

## 2022-05-04 DIAGNOSIS — I87.311 CHRONIC VENOUS HYPERTENSION (IDIOPATHIC) WITH ULCER OF RIGHT LOWER EXTREMITY (HCC): ICD-10-CM

## 2022-05-04 PROCEDURE — 97597 DBRDMT OPN WND 1ST 20 CM/<: CPT | Performed by: ORTHOPAEDIC SURGERY

## 2022-05-04 PROCEDURE — 99214 OFFICE O/P EST MOD 30 MIN: CPT | Performed by: ORTHOPAEDIC SURGERY

## 2022-05-04 RX ORDER — BETAMETHASONE DIPROPIONATE 0.5 MG/G
CREAM TOPICAL AS NEEDED
Qty: 30 G | Refills: 0 | Status: SHIPPED | OUTPATIENT
Start: 2022-05-04

## 2022-05-04 RX ORDER — LIDOCAINE 40 MG/G
CREAM TOPICAL ONCE
Status: COMPLETED | OUTPATIENT
Start: 2022-05-04 | End: 2022-05-04

## 2022-05-04 RX ADMIN — LIDOCAINE 1 APPLICATION: 40 CREAM TOPICAL at 08:31

## 2022-05-04 NOTE — PATIENT INSTRUCTIONS
Orders Placed This Encounter   Procedures    Wound cleansing and dressings     RLE wound:     Keep dressing clean dry and intact on non dressing change days  On dressing change days, remove dressing and shower---recommend using Dove soap  Redress directly after shower      Apply Betamethasone to the periwound  Apply Opticell AG to the wound  Cover with gauze, secure with rolled gauze  Change every other day        This was applied today                     Standing Status:   Future     Standing Expiration Date:   5/4/2023    Wound compression and edema control     Wound compression and edema control   Elastic Tubular Stocking size- Spandagrip size E applied today  Due to size F unavailable  May use compression stockings from home 20-30mm HG      Tubular elastic bandage: Apply from base of toes to behind the knee   Apply in AM, may remove for sleep      Avoid prolonged standing in one place      Elevate leg(s) above the level of the heart when sitting or as much as possible     Standing Status:   Future     Standing Expiration Date:   5/4/2023

## 2022-05-04 NOTE — PROGRESS NOTES
Patient ID: Steffany Phillip is a 64 y o  female Date of Birth 1960       Chief Complaint   Patient presents with    Follow Up Wound Care Visit     Right Leg wound       Allergies:  Wound dressing adhesive    Diagnosis:   Diagnosis ICD-10-CM Associated Orders   1  Non-pressure chronic ulcer of right lower leg with fat layer exposed ([facility])  L97 912 lidocaine (LMX) 4 % cream     Wound cleansing and dressings     Wound compression and edema control     betamethasone dipropionate (DIPROSONE) 0 05 % cream   2  Chronic venous hypertension (idiopathic) with ulcer of right lower extremity ([facility])  I87 311     L97 919         Assessment and Plan :  · RLE Venous ulcer healing has stalled and bhaskar-wound erythema remains unchanged  · Change wound management to [facility], see wound orders below   · Continue compression with Tubigrip  · F/u with Vascular for evaluation of painful nodules on medial aspect of RLE  · No harsh cleansers such as alcohol, peroxide, or antibacterial soap, do not submerge in water  · Can cleanse with mild soap and water at dressing changes  · Continue frequent elevation of leg to decrease edema  · Continue eating protein (3-4 servings per day) and loosing weight to increase healing potential    · Followup in 2 weeks or call sooner with questions or concerns      Subjective:   Patient presents for followup of RLE venous ulcer   Pt states she was off her feet for five days with very little improvement  Pt c/o pain on periwound and feels "lumps" on medial aspect of her leg  Pt  has been using Dermagran on the wound bed and Tubigrip for compression   Pt denies any SOB, calf pain, fevers or chills  Pt is accompanied by her daughter who is actively involved in her care          The following portions of the patient's history were reviewed and updated as appropriate:   Patient Active Problem List   Diagnosis    Cellulitis of right anterior lower leg    Primary hypertension    Venous ulcer of right lower extremity with varicose veins (HCC)    Chronic venous hypertension (idiopathic) with ulcer of right lower extremity (HCC)    COVID-19    Elevated liver enzymes    Hypokalemia    Sepsis (HCC)    Diarrhea    Thrombophlebitis     Past Medical History:   Diagnosis Date    Hypertension     Ulcer of right shin ([address] )      No past surgical history on file  Family History   Problem Relation Age of Onset    No Known Problems Mother     No Known Problems Father     No Known Problems Sister     No Known Problems Brother     No Known Problems Son     No Known Problems Daughter     No Known Problems Maternal Grandmother     No Known Problems Maternal Grandfather     No Known Problems Paternal Grandmother     No Known Problems Paternal Grandfather     No Known Problems Maternal Aunt     No Known Problems Maternal Uncle     No Known Problems Paternal Aunt     No Known Problems Paternal Uncle     No Known Problems Cousin      Social History     Socioeconomic History    Marital status: /Civil Union     Spouse name: None    Number of children: None    Years of education: None    Highest education level: None   Occupational History    None   Tobacco Use    Smoking status: Never Smoker    Smokeless tobacco: Never Used   Vaping Use    Vaping Use: Never used   Substance and Sexual Activity    Alcohol use: Not Currently    Drug use: Never    Sexual activity: Not Currently   Other Topics Concern    None   Social History Narrative    None     Social Determinants of Health     Financial Resource Strain: Not on file   Food Insecurity: No Food Insecurity    Worried About Running Out of Food in the Last Year: Never true    Bryce of Food in the Last Year: Never true   Transportation Needs: No Transportation Needs    Lack of Transportation (Medical): No    Lack of Transportation (Non-Medical):  No   Physical Activity: Not on file   Stress: Not on file   Social Connections: Not on file Intimate Partner Violence: Not on file   Housing Stability: Low Risk     Unable to Pay for Housing in the Last Year: No    Number of Places Lived in the Last Year: 1    Unstable Housing in the Last Year: No       Current Outpatient Medications:     Acetaminophen 325 MG CAPS, Take 3 capsules (975 mg total) by mouth every 8 (eight) hours as needed (mild pain), Disp: 30 capsule, Rfl: 0    betamethasone dipropionate (DIPROSONE) 0 05 % cream, Apply topically as needed (for redness), Disp: 30 g, Rfl: 0  No current facility-administered medications for this visit  Review of Systems   Constitutional: Negative  HENT: Negative for ear pain and hearing loss  Eyes: Negative for pain  Respiratory: Negative for chest tightness and shortness of breath  Cardiovascular: Positive for leg swelling  Negative for chest pain and palpitations  Gastrointestinal: Negative for diarrhea, nausea and vomiting  Genitourinary: Negative for dysuria  Musculoskeletal: Negative for gait problem  Skin: Positive for wound (RLE)  Neurological: Negative for tremors and weakness  Psychiatric/Behavioral: Negative for behavioral problems, confusion and suicidal ideas  Objective:  BP (!) 189/88   Pulse 68   Temp (!) 96 8 °F (36 °C)   Resp 18   Pain Score:   7     Physical Exam  Vitals reviewed  Constitutional:       General: She is not in acute distress  Appearance: Normal appearance  She is well-developed  She is obese  HENT:      Head: Normocephalic and atraumatic  Cardiovascular:      Rate and Rhythm: Normal rate  Pulmonary:      Effort: Pulmonary effort is normal    Musculoskeletal:         General: No deformity  Right lower leg: Edema present  Left lower leg: No edema  Skin:     General: Skin is warm and dry  Findings: Wound (RLE Venous ulcer) present  Comments: +edema, +hyperpigmented periwound, pink granulated tissue with biofilm   Firm painful nodules on medial aspect of RLE appreciated on exam, no calf pain, negative homans sign   See wound assessment   Neurological:      General: No focal deficit present  Mental Status: She is alert and oriented to person, place, and time  Gait: Gait normal    Psychiatric:         Mood and Affect: Mood and affect normal          Behavior: Behavior normal  Behavior is cooperative  Wound 12/15/21 Venous Ulcer Pretibial Distal;Right (Active)   Wound Description Yellow;Pink;Granulation tissue 05/04/22 0826   Angelika-wound Assessment Edema; Erythema; Hyperpigmented;Scar Tissue 05/04/22 0826   Wound Length (cm) 1 cm 05/04/22 0826   Wound Width (cm) 0 3 cm 05/04/22 0826   Wound Depth (cm) 0 1 cm 05/04/22 0826   Wound Surface Area (cm^2) 0 3 cm^2 05/04/22 0826   Wound Volume (cm^3) 0 03 cm^3 05/04/22 0826   Calculated Wound Volume (cm^3) 0 03 cm^3 05/04/22 0826   Change in Wound Size % 92 86 05/04/22 0826   Drainage Amount Small 05/04/22 0826   Drainage Description Serosanguineous 05/04/22 0826   Non-staged Wound Description Full thickness 05/04/22 0826   Treatments Irrigation with NSS 03/04/22 1426   Dressing Changed Changed 12/29/21 0854   Patient Tolerance Tolerated well 12/29/21 0854   Dressing Status Intact 05/04/22 0826          Debridement   Wound 12/15/21 Venous Ulcer Pretibial Distal;Right    Universal Protocol:  Consent: Verbal consent obtained  Written consent obtained  Risks and benefits: risks, benefits and alternatives were discussed  Consent given by: patient  Time out: Immediately prior to procedure a "time out" was called to verify the correct patient, procedure, equipment, support staff and site/side marked as required    Patient understanding: patient states understanding of the procedure being performed  Patient identity confirmed: verbally with patient      Performed by: PA  Debridement type: selective  Pain control: lidocaine 4%  Post-debridement measurements  Length (cm): 1  Width (cm): 0 3  Depth (cm): 0 1  Percent debrided: 100%  Surface Area (cm^2): 0 3  Area debrided (cm^2): 0 3  Volume (cm^3): 0 03  Devitalized tissue debrided: biofilm and exudate  Instrument(s) utilized: curette  Bleeding: small  Hemostasis obtained with: pressure  Procedural pain (0-10): 0  Post-procedural pain: 0   Response to treatment: procedure was tolerated well           Results from last 6 Months   Lab Units 12/15/21  0951   WOUND CULTURE  4+ Growth of Staphylococcus aureus*  3+ Growth of Beta Hemolytic Streptococcus Group C*         Wound Instructions:  Orders Placed This Encounter   Procedures    Wound cleansing and dressings     RLE wound:     Keep dressing clean dry and intact on non dressing change days  On dressing change days, remove dressing and shower---recommend using Dove soap  Redress directly after shower      Apply Betamethasone to the periwound  Apply Opticell AG to the wound  Cover with gauze, secure with rolled gauze  Change every other day        This was applied today                     Standing Status:   Future     Standing Expiration Date:   5/4/2023    Wound compression and edema control     Wound compression and edema control   Elastic Tubular Stocking size- Spandagrip size E applied today  Due to size F unavailable  May use compression stockings from home 20-30mm HG      Tubular elastic bandage: Apply from base of toes to behind the knee  Apply in AM, may remove for sleep      Avoid prolonged standing in one place      Elevate leg(s) above the level of the heart when sitting or as much as possible     Standing Status:   Future     Standing Expiration Date:   5/4/2023       Familia Berry PA-C, [facility]      Portions of the record may have been created with voice recognition software  Occasional wrong word or "sound alike" substitutions may have occurred due to the inherent limitations of voice recognition software  Read the chart carefully and recognize, using context, where substitutions have occurred

## 2022-05-05 ENCOUNTER — TELEPHONE (OUTPATIENT)
Dept: VASCULAR SURGERY | Facility: CLINIC | Age: 62
End: 2022-05-05

## 2022-05-05 ENCOUNTER — OFFICE VISIT (OUTPATIENT)
Dept: VASCULAR SURGERY | Facility: CLINIC | Age: 62
End: 2022-05-05
Payer: COMMERCIAL

## 2022-05-05 VITALS
WEIGHT: 216 LBS | SYSTOLIC BLOOD PRESSURE: 182 MMHG | BODY MASS INDEX: 39.75 KG/M2 | HEIGHT: 62 IN | DIASTOLIC BLOOD PRESSURE: 94 MMHG | HEART RATE: 85 BPM | TEMPERATURE: 98.6 F

## 2022-05-05 DIAGNOSIS — L97.919 CHRONIC VENOUS HYPERTENSION (IDIOPATHIC) WITH ULCER OF RIGHT LOWER EXTREMITY (HCC): ICD-10-CM

## 2022-05-05 DIAGNOSIS — I83.019 VENOUS ULCER OF RIGHT LOWER EXTREMITY WITH VARICOSE VEINS (HCC): Primary | ICD-10-CM

## 2022-05-05 DIAGNOSIS — I87.311 CHRONIC VENOUS HYPERTENSION (IDIOPATHIC) WITH ULCER OF RIGHT LOWER EXTREMITY (HCC): ICD-10-CM

## 2022-05-05 DIAGNOSIS — L97.919 VENOUS ULCER OF RIGHT LOWER EXTREMITY WITH VARICOSE VEINS (HCC): Primary | ICD-10-CM

## 2022-05-05 PROCEDURE — 99214 OFFICE O/P EST MOD 30 MIN: CPT | Performed by: SURGERY

## 2022-05-05 RX ORDER — CEFAZOLIN SODIUM 2 G/50ML
2000 SOLUTION INTRAVENOUS ONCE
Status: CANCELLED | OUTPATIENT
Start: 2022-06-24 | End: 2022-05-05

## 2022-05-05 RX ORDER — CHLORHEXIDINE GLUCONATE 0.12 MG/ML
15 RINSE ORAL ONCE
Status: CANCELLED | OUTPATIENT
Start: 2022-05-05 | End: 2022-05-05

## 2022-05-05 NOTE — LETTER
May 5, 2022     [address] [facility]  [address] [facility]    Patient: Riya Kovacs   YOB: 1960   Date of Visit: 5/5/2022       Dear Dr Elian Traore: Thank you for referring Riya Kovacs to me for evaluation  Below are my notes for this consultation  If you have questions, please do not hesitate to call me  I look forward to following your patient along with you           Sincerely,        Joe Hill MD        CC: No Recipients

## 2022-05-05 NOTE — PROGRESS NOTES
Assessment/Plan:    Presents with chronic venous stasis ulceration right leg with hyperpigmentation and recurrent ulcer in the anterior tibial region  She had a reflux study done over 6 months ago did show evidence of likely anterior accessory vein as the saphenous vein was ablated over 15 years ago  She has multiple incompetent perforating veins and varicosities in the lower leg which are communicating with the ulcerated area  Plan:  Obtaining repeat imaging right lower extremity reflux study and likely planning endovenous laser ablation of the right greater saphenous vein with multiple stab phlebectomies sometime in the near future to [address] [place] [address]       Diagnoses and all orders for this visit:    Venous ulcer of right lower extremity with varicose veins ([address] )  -     Case request operating room: ENDOVASCULAR LASER THERAPY (EVLT) right leg with multiple stab phlebectomies; Standing  -     Basic metabolic panel; Future  -     CBC and Platelet; Future  -     Case request operating room: ENDOVASCULAR LASER THERAPY (EVLT) right leg with multiple stab phlebectomies    Chronic venous hypertension (idiopathic) with ulcer of right lower extremity (HCC)        Subjective:      Patient ID: Mayco Gonzalez is a 64 y o  female  Pt is here for wound check for R shin ulcer  Pt has been going to wound care  HPI    The following portions of the patient's history were reviewed and updated as appropriate: allergies, current medications, past family history, past medical history, past social history, past surgical history and problem list     Review of Systems   Constitutional: Negative  HENT: Negative  Eyes: Negative  Respiratory: Negative  Cardiovascular: Negative  Gastrointestinal: Negative  Endocrine: Negative  Genitourinary: Negative  Musculoskeletal: Negative  Skin: Positive for wound  Allergic/Immunologic: Negative  Neurological: Negative  Hematological: Negative  Psychiatric/Behavioral: Negative  Objective: There were no vitals taken for this visit  Physical Exam      Oriented x3 no evidence of clinical depression  Eyes:  Sclera non-icteric    Skin: normal without evidence of inflammation    Neck is supple carotid pulses equal bilaterally no bruits heard    Chest lungs clear, heart regular rhythm  Pulses are palpable bilateral Femoral      Upon standing varicosities are noted throughout the medial calf and onto the foot area with perforating vein and varicosities emanating from that area directly into the wound which is anterior with hyperpigmentation brawny induration and lipodermatosclerosis  Neurological exam intact cranial nerves 2-12 grossly intact no gross motor sensory deficits detected        Imaging viewed and reviewed with Patient    EVLT Operative Scheduling Information:    Hospital:  [facility]    Physician:  Me    Surgery:  Right EVLT with multiple stab phlebectomies    Urgency:  Standard    Level:  Level 3: Outpatients to be scheduled for elective surgery than can be delayed up to 4 weeks without reasonable expectation of detriment to patient    Case Length:  Normal    Post-op Bed:  Outpatient    OR Table:  Standard    Equipment Needs:  Vascular technologist    Medication Instructions:  None    Hydration:  No    Contrast Allergy:  No    Venous Clinical Severity Scores (VCSS)  Item Absent   (0 points) Mild   (1 point) Moderate   (2 points) Severe   (3 points)   Pain [] None [] Occasional [] Daily [x] Daily limiting   Varicose veins [] None [] Few [x] Calf or thigh [] Calf and thigh   Venous edema [] None [] Foot and ankle [x] Above ankle, below knee [] To knee of above   Skin pigmentation [] None [] Perimalleolar [x] Diffuse, lower 1/3 calf [] Wider, above lower 1/3 calf   Inflammation [] None [] Perimalleolar [] Diffuse, lower 1/3 calf [x] Wider, above lower 1/3 calf   Induration [] None [] Perimalleolar [] Diffuse, lower 1/3 calf [x] Wider, above lower 1/3 calf   No  active ulcers [] None [x] 1 [] 2 [] ? 3   Active ulcer size [] None [x] <2 cm [] 2 - 6 cm [] >6 cm   Ulcer duration [] None [] <3 months [] 3 - 12 months [x] >1 year   Compression therapy [] None [] Intermittent [] Most days [x] Fully comply   Total 23          CEAP Clinical Classification  [x] Symptomatic   [] Asymptomatic     [] Class 0 No visible or palpable signs of venous disease   [] Class 1 Telangiectasies or reticular veins   [] Class 2 Varicose veins; distinguished from reticular veins by a diameter of 3mm or more   [] Class 3 Edema   [] Class 4 Changes in skin and subcutaneous tissue secondary to CVD    [] Class 4a Pigmentation or eczema   [] Class 4b Lipodermatosclerosis or atrophie claudia   [] Class 5 Healed venous ulcer   [x] Class 6 Active venous ulcer

## 2022-05-05 NOTE — ASSESSMENT & PLAN NOTE
Presents with chronic venous stasis ulceration right leg with hyperpigmentation and recurrent ulcer in the anterior tibial region  She had a reflux study done over 6 months ago did show evidence of likely anterior accessory vein as the saphenous vein was ablated over 15 years ago  She has multiple incompetent perforating veins and varicosities in the lower leg which are communicating with the ulcerated area      Plan:  Obtaining repeat imaging right lower extremity reflux study and likely planning endovenous laser ablation of the right greater saphenous vein with multiple stab phlebectomies sometime in the near future to [address]  [address]

## 2022-05-05 NOTE — PATIENT INSTRUCTIONS
Presents with chronic venous stasis ulceration right leg with hyperpigmentation and recurrent ulcer in the anterior tibial region  She had a reflux study done over 6 months ago did show evidence of likely anterior accessory vein as the saphenous vein was ablated over 15 years ago  She has multiple incompetent perforating veins and varicosities in the lower leg which are communicating with the ulcerated area      Plan:  Obtaining repeat imaging right lower extremity reflux study and likely planning endovenous laser ablation of the right greater saphenous vein with multiple stab phlebectomies sometime in the near future to [address]

## 2022-05-05 NOTE — TELEPHONE ENCOUNTER
REMINDER: Under Reason For Call, comments MUST be formatted as:   (Surgeon's Initials) / (Procedure)    Physician / PRANAY MONTEMAYOR: DEWAYNE // Noe Coto (NPI: [id number]) / Sky Kang (Tax: [id number] / NPI: [id number])    Procedure: (EVLT) Endovenous Laser Treatment WITH Stab Phlebectomies of the right upper leg    Level: 4 - Route clearance(s) to [facility] Surgery Coordinator Pool    Equipment / Rep Needs: Unknown    Assistant Surgeon: No    Allergies: Wound dressing adhesive    Instructions Given: EVLT Packet with NO Bowel Prep General Instructions     Blood Thinners / Medication Hold: Patient is not taking any blood thinners  Hydration Required: Patient does not require hydration  Dialysis: Patient is not on dialysis  Consent: I certify that patient has signed, printed, timed, and dated their surgery consent  I certify that the patient's LEGAL NAME and DATE OF BIRTH are written in the upper left corner on BOTH sides of the consent  I certify that BOTH sides of the completed surgery consent have been scanned into the patient's Epic chart by myself on 5/5/2022  Yes, I have LABELED the consent in Epic as Consent for Vascular Procedure  Yes, I have LABELED the consent in Epic as Consent for Vascular Procedure  Clearances     Levels   1-3 ROUTE this encounter to [facility] Clearance Pool   AND   SEND Clearance Form(s) to Vascular Nursing e-mail group   Level   4 ROUTE this encounter to [facility] Surgery Coordinator Pool  AND   SEND Clearance Form(s) to Vascular Surgery Schedulers e-mail group     Patient does not require any pre operative clearance  Yes, I have ROUTED this encounter to [facility] [place] [facility] Surgery Coordinator and/or [facility] Clearance Pool

## 2022-05-09 ENCOUNTER — PREP FOR PROCEDURE (OUTPATIENT)
Dept: VASCULAR SURGERY | Facility: CLINIC | Age: 62
End: 2022-05-09

## 2022-05-09 NOTE — TELEPHONE ENCOUNTER
Verified patient's insurance   CONFIRMED - Patient's insurance is [employer]  Is patient requesting a call when authorization has been obtained? Patient did not request a call  Surgery Date: 6/24/22  Primary Surgeon: DEWAYNE // Fidel Cheung (NPI: [id number])  Assisting Surgeon: Not Applicable (N/A)  Facility: [facility] (Tax: [id number] / NPI: [id number])  Inpatient / Outpatient: Outpatient  Level: 4    Clearance Received: No clearance ordered  Consent Received: Yes, scanned into Epic on 5/5/22  Medication Hold / Last Dose: Not Applicable (N/A)  VQI Spreadsheet: Not Applicable (N/A)  IR Notified: Not Applicable (N/A)  Rep  Notified: Not Applicable (N/A)  Equipment Needs: Not Applicable (N/A)  [facility] Lab Requested: 5/9/22  Patient Contacted: 5/9/22    Diagnosis: I83 019, L97 919  Procedure/ CPT Code(s): (EVLT) Endovenous Laser Treatment WITH Stab Phlebectomies of the right upper leg // CPT: 31808, 09077     For varicose vein related procedures:   Last LEVDR: Yes, patient's Leward Livingston was completed within 12-months of their procedure date  CEAP Classification: 6, symptomatic  VCSS: 23    Post Operative Date/ Time: 6/27/22 , 2:30pm [place] with CRNP - Cozette Brittle (NPI: [id number])   Doppler to follow at 3:30pm at [facility] (no appts were available at [facility] this day - checked with Becca as well)     Pt will have blood work done at [facility] done 1/25/22

## 2022-05-09 NOTE — TELEPHONE ENCOUNTER
S/w pt and offered her dates in [place] with Dr Gogo Erazo for her R EVLT w/stab phlebs  Pt will look at her schedule and call me back

## 2022-05-18 ENCOUNTER — APPOINTMENT (OUTPATIENT)
Dept: LAB | Facility: HOSPITAL | Age: 62
End: 2022-05-18
Attending: SURGERY
Payer: COMMERCIAL

## 2022-05-18 ENCOUNTER — OFFICE VISIT (OUTPATIENT)
Dept: WOUND CARE | Facility: HOSPITAL | Age: 62
End: 2022-05-18
Payer: COMMERCIAL

## 2022-05-18 VITALS
RESPIRATION RATE: 16 BRPM | TEMPERATURE: 96 F | DIASTOLIC BLOOD PRESSURE: 85 MMHG | SYSTOLIC BLOOD PRESSURE: 182 MMHG | HEART RATE: 84 BPM

## 2022-05-18 DIAGNOSIS — I87.311 CHRONIC VENOUS HYPERTENSION (IDIOPATHIC) WITH ULCER OF RIGHT LOWER EXTREMITY (HCC): ICD-10-CM

## 2022-05-18 DIAGNOSIS — L97.912 NON-PRESSURE CHRONIC ULCER OF RIGHT LOWER LEG WITH FAT LAYER EXPOSED (HCC): Primary | ICD-10-CM

## 2022-05-18 DIAGNOSIS — L97.919 CHRONIC VENOUS HYPERTENSION (IDIOPATHIC) WITH ULCER OF RIGHT LOWER EXTREMITY (HCC): ICD-10-CM

## 2022-05-18 DIAGNOSIS — L97.919 VENOUS ULCER OF RIGHT LOWER EXTREMITY WITH VARICOSE VEINS (HCC): ICD-10-CM

## 2022-05-18 DIAGNOSIS — I83.019 VENOUS ULCER OF RIGHT LOWER EXTREMITY WITH VARICOSE VEINS (HCC): ICD-10-CM

## 2022-05-18 LAB
ANION GAP SERPL CALCULATED.3IONS-SCNC: 5 MMOL/L (ref 4–13)
BUN SERPL-MCNC: 21 MG/DL (ref 5–25)
CALCIUM SERPL-MCNC: 9.6 MG/DL (ref 8.3–10.1)
CHLORIDE SERPL-SCNC: 107 MMOL/L (ref 100–108)
CO2 SERPL-SCNC: 27 MMOL/L (ref 21–32)
CREAT SERPL-MCNC: 0.76 MG/DL (ref 0.6–1.3)
ERYTHROCYTE [DISTWIDTH] IN BLOOD BY AUTOMATED COUNT: 12.5 % (ref 11.6–15.1)
GFR SERPL CREATININE-BSD FRML MDRD: 84 ML/MIN/1.73SQ M
GLUCOSE P FAST SERPL-MCNC: 98 MG/DL (ref 65–99)
HCT VFR BLD AUTO: 45.1 % (ref 34.8–46.1)
HGB BLD-MCNC: 15.1 G/DL (ref 11.5–15.4)
MCH RBC QN AUTO: 30.9 PG (ref 26.8–34.3)
MCHC RBC AUTO-ENTMCNC: 33.5 G/DL (ref 31.4–37.4)
MCV RBC AUTO: 92 FL (ref 82–98)
PLATELET # BLD AUTO: 232 THOUSANDS/UL (ref 149–390)
PMV BLD AUTO: 9.3 FL (ref 8.9–12.7)
POTASSIUM SERPL-SCNC: 4 MMOL/L (ref 3.5–5.3)
RBC # BLD AUTO: 4.89 MILLION/UL (ref 3.81–5.12)
SODIUM SERPL-SCNC: 139 MMOL/L (ref 136–145)
WBC # BLD AUTO: 6.42 THOUSAND/UL (ref 4.31–10.16)

## 2022-05-18 PROCEDURE — 97597 DBRDMT OPN WND 1ST 20 CM/<: CPT | Performed by: ORTHOPAEDIC SURGERY

## 2022-05-18 PROCEDURE — 36415 COLL VENOUS BLD VENIPUNCTURE: CPT

## 2022-05-18 PROCEDURE — 99214 OFFICE O/P EST MOD 30 MIN: CPT | Performed by: ORTHOPAEDIC SURGERY

## 2022-05-18 PROCEDURE — 80048 BASIC METABOLIC PNL TOTAL CA: CPT

## 2022-05-18 PROCEDURE — 85027 COMPLETE CBC AUTOMATED: CPT

## 2022-05-18 RX ORDER — LIDOCAINE 40 MG/G
CREAM TOPICAL ONCE
Status: COMPLETED | OUTPATIENT
Start: 2022-05-18 | End: 2022-05-18

## 2022-05-18 RX ORDER — TRAMADOL HYDROCHLORIDE 50 MG/1
50 TABLET ORAL EVERY 6 HOURS PRN
Qty: 30 TABLET | Refills: 0 | Status: SHIPPED | OUTPATIENT
Start: 2022-05-18

## 2022-05-18 RX ADMIN — LIDOCAINE 1 APPLICATION: 40 CREAM TOPICAL at 08:38

## 2022-05-18 NOTE — PROGRESS NOTES
Patient ID: Manuel Godfrey is a 64 y o  female Date of Birth 1960       Chief Complaint   Patient presents with    Follow Up Wound Care Visit     RLE       Allergies:  Wound dressing adhesive    Diagnosis:   Diagnosis ICD-10-CM Associated Orders   1  Non-pressure chronic ulcer of right lower leg with fat layer exposed ([facility])  L97 912 lidocaine (LMX) 4 % cream     Wound cleansing and dressings     traMADol (ULTRAM) 50 mg tablet   2  Chronic venous hypertension (idiopathic) with ulcer of right lower extremity (HCC)  I87 311     L97 918         Assessment and Plan :  · RLE Venous ulcer increased in length, pt is scheduled for endovenous laser ablation of the right greater saphenous vein with multiple stab phlebectomies by Dr Aleksey Garcia on 6/24/22  · Continue wound management to [facility], see wound orders below   · Continue compression with compression stockings  · Prescribed Tramadol for moderate to severe pain  · No harsh cleansers such as alcohol, peroxide, or antibacterial soap, do not submerge in water  · Can cleanse with mild soap and water at dressing changes  · Continue frequent elevation of leg to decrease edema  · Continue eating protein (3-4 servings per day) and loosing weight to increase healing potential    · Followup in 2 weeks or call sooner with questions or concerns       Subjective:   Patient presents for followup of RLE venous ulcer  Pt evaluated by Dr Aleksey Garcia and states reflux study done over 6 months ago did show evidence of likely anterior accessory vein as the saphenous vein was ablated over 15 years ago  She has multiple incompetent perforating veins and varicosities in the lower leg which are communicating with the ulcerated area  Plant is to perform an endovenous laser ablation of the right greater saphenous vein with multiple stab phlebectomies on June 24, 2022  Pt states pain remains unchanged   Pt  has been using Dermagran on the wound bed and compression stockings   Pt denies any SOB, calf pain, fevers or chills  Pt is accompanied by her daughter who is actively involved in her care  The following portions of the patient's history were reviewed and updated as appropriate:   Patient Active Problem List   Diagnosis    Cellulitis of right anterior lower leg    Primary hypertension    Venous ulcer of right lower extremity with varicose veins (HCC)    Chronic venous hypertension (idiopathic) with ulcer of right lower extremity (HCC)    COVID-19    Elevated liver enzymes    Hypokalemia    Sepsis ([facility] [address] [facility]    Diarrhea    Thrombophlebitis     Past Medical History:   Diagnosis Date    Hypertension     Ulcer of right shin ([facility] [address] )      No past surgical history on file    Family History   Problem Relation Age of Onset    No Known Problems Mother     No Known Problems Father     No Known Problems Sister     No Known Problems Brother     No Known Problems Son     No Known Problems Daughter     No Known Problems Maternal Grandmother     No Known Problems Maternal Grandfather     No Known Problems Paternal Grandmother     No Known Problems Paternal Grandfather     No Known Problems Maternal Aunt     No Known Problems Maternal Uncle     No Known Problems Paternal Aunt     No Known Problems Paternal Uncle     No Known Problems Cousin      Social History     Socioeconomic History    Marital status: /Civil Union     Spouse name: None    Number of children: None    Years of education: None    Highest education level: None   Occupational History    None   Tobacco Use    Smoking status: Never Smoker    Smokeless tobacco: Never Used   Vaping Use    Vaping Use: Never used   Substance and Sexual Activity    Alcohol use: Not Currently    Drug use: Never    Sexual activity: Not Currently   Other Topics Concern    None   Social History Narrative    None     Social Determinants of Health     Financial Resource Strain: Not on file   Food Insecurity: No Food Insecurity  Worried About Running Out of Food in the Last Year: Never true    Bryce of Food in the Last Year: Never true   Transportation Needs: No Transportation Needs    Lack of Transportation (Medical): No    Lack of Transportation (Non-Medical): No   Physical Activity: Not on file   Stress: Not on file   Social Connections: Not on file   Intimate Partner Violence: Not on file   Housing Stability: Low Risk     Unable to Pay for Housing in the Last Year: No    Number of Places Lived in the Last Year: 1    Unstable Housing in the Last Year: No       Current Outpatient Medications:     traMADol (ULTRAM) 50 mg tablet, Take 1 tablet (50 mg total) by mouth every 6 (six) hours as needed for moderate pain, Disp: 30 tablet, Rfl: 0    Acetaminophen 325 MG CAPS, Take 3 capsules (975 mg total) by mouth every 8 (eight) hours as needed (mild pain), Disp: 30 capsule, Rfl: 0    betamethasone dipropionate (DIPROSONE) 0 05 % cream, Apply topically as needed (for redness), Disp: 30 g, Rfl: 0  No current facility-administered medications for this visit  Review of Systems   Constitutional: Negative  HENT: Negative for ear pain and hearing loss  Eyes: Negative for pain  Respiratory: Negative for chest tightness and shortness of breath  Cardiovascular: Positive for leg swelling  Negative for chest pain and palpitations  Gastrointestinal: Negative for diarrhea, nausea and vomiting  Genitourinary: Negative for dysuria  Musculoskeletal: Negative for gait problem  Skin: Positive for wound (RLE)  Neurological: Negative for tremors and weakness  Psychiatric/Behavioral: Negative for behavioral problems, confusion and suicidal ideas  Objective:  BP (!) 182/85   Pulse 84   Temp (!) 96 °F (35 6 °C)   Resp 16   Pain Score:   6     Physical Exam  Vitals reviewed  Constitutional:       General: She is not in acute distress  Appearance: Normal appearance  She is well-developed  She is obese     HENT: Head: Normocephalic and atraumatic  Cardiovascular:      Rate and Rhythm: Normal rate  Pulmonary:      Effort: Pulmonary effort is normal    Musculoskeletal:         General: No deformity  Right lower leg: Edema present  Left lower leg: No edema  Skin:     General: Skin is warm and dry  Findings: Wound (RLE Venous ulcer) present  Comments: +edema, +hyperpigmented periwound, pink granulated tissue with biofilm  See wound assessment   Neurological:      General: No focal deficit present  Mental Status: She is alert and oriented to person, place, and time  Gait: Gait normal    Psychiatric:         Mood and Affect: Mood and affect normal          Behavior: Behavior normal  Behavior is cooperative  Wound 12/15/21 Venous Ulcer Pretibial Distal;Right (Active)   Wound Description Pink;Yellow 05/18/22 0833   Angelika-wound Assessment Edema; Erythema; Hyperpigmented 05/18/22 0833   Wound Length (cm) 5 cm 05/18/22 0833   Wound Width (cm) 0 2 cm 05/18/22 0833   Wound Depth (cm) 0 1 cm 05/18/22 0833   Wound Surface Area (cm^2) 1 cm^2 05/18/22 0833   Wound Volume (cm^3) 0 1 cm^3 05/18/22 0833   Calculated Wound Volume (cm^3) 0 1 cm^3 05/18/22 0833   Change in Wound Size % 76 19 05/18/22 0833   Drainage Amount Scant 05/18/22 0833   Drainage Description Serosanguineous 05/18/22 0833   Non-staged Wound Description Full thickness 05/18/22 0833   Treatments Irrigation with NSS 03/04/22 1426   Dressing Changed Changed 12/29/21 0854   Patient Tolerance Tolerated well 12/29/21 0854   Dressing Status Intact 05/04/22 0826       Debridement   Wound 12/15/21 Venous Ulcer Pretibial Distal;Right    Universal Protocol:  Consent: Verbal consent obtained  Written consent obtained    Risks and benefits: risks, benefits and alternatives were discussed  Consent given by: patient  Time out: Immediately prior to procedure a "time out" was called to verify the correct patient, procedure, equipment, support staff and site/side marked as required  Patient understanding: patient states understanding of the procedure being performed  Patient identity confirmed: verbally with patient      Performed by: PA  Debridement type: selective  Pain control: lidocaine 4%  Post-debridement measurements  Length (cm): 5  Width (cm): 0 2  Depth (cm): 0 1  Percent debrided: 100%  Surface Area (cm^2): 1  Area debrided (cm^2): 1  Volume (cm^3): 0 1  Devitalized tissue debrided: biofilm, exudate and slough  Instrument(s) utilized: curette  Bleeding: small  Hemostasis obtained with: pressure  Procedural pain (0-10): 0  Post-procedural pain: 0   Response to treatment: procedure was tolerated well         Results from last 6 Months   Lab Units 12/15/21  0951   WOUND CULTURE  4+ Growth of Staphylococcus aureus*  3+ Growth of Beta Hemolytic Streptococcus Group C*         Wound Instructions:  Orders Placed This Encounter   Procedures    Wound cleansing and dressings     RLE wound:     Keep dressing clean dry and intact on non dressing change days  On dressing change days, remove dressing and shower---recommend using Dove soap  Redress directly after shower      Apply Betamethasone to the periwound  Apply Opticell AG to the wound---if drainage becomes less may go back to the Dermagran gauze  Cover with gauze, secure with rolled gauze  Change every other day         This was applied today                                                    Wound compression and edema control   Elastic Tubular Stocking size- Spandagrip size E applied today  Due to size F unavailable  May use compression stockings from home 20-30mm HG      Tubular elastic bandage: Apply from base of toes to behind the knee   Apply in AM, may remove for sleep      Avoid prolonged standing in one place      Elevate leg(s) above the level of the heart when sitting or as much as possible          Standing Status:   Future     Standing Expiration Date:   5/18/2023 Gail Garcia PA-C, [facility]      Portions of the record may have been created with voice recognition software  Occasional wrong word or "sound alike" substitutions may have occurred due to the inherent limitations of voice recognition software  Read the chart carefully and recognize, using context, where substitutions have occurred

## 2022-05-18 NOTE — PATIENT INSTRUCTIONS
Orders Placed This Encounter   Procedures    Wound cleansing and dressings     RLE wound:     Keep dressing clean dry and intact on non dressing change days  On dressing change days, remove dressing and shower---recommend using Dove soap  Redress directly after shower  Apply Betamethasone to the periwound  Apply Opticell AG to the wound---if drainage becomes less may go back to the Dermagran gauze  Cover with gauze, secure with rolled gauze  Change every other day         This was applied today                                                    Wound compression and edema control   Elastic Tubular Stocking size- Spandagrip size E applied today  Due to size F unavailable  May use compression stockings from home 20-30mm HG      Tubular elastic bandage: Apply from base of toes to behind the knee  Apply in AM, may remove for sleep  Avoid prolonged standing in one place       Elevate leg(s) above the level of the heart when sitting or as much as possible          Standing Status:   Future     Standing Expiration Date:   5/18/2023

## 2022-05-19 ENCOUNTER — HOSPITAL ENCOUNTER (OUTPATIENT)
Dept: NON INVASIVE DIAGNOSTICS | Facility: HOSPITAL | Age: 62
Discharge: HOME | End: 2022-05-19
Attending: SURGERY
Payer: COMMERCIAL

## 2022-05-19 DIAGNOSIS — I83.019 VENOUS ULCER OF RIGHT LOWER EXTREMITY WITH VARICOSE VEINS (HCC): ICD-10-CM

## 2022-05-19 DIAGNOSIS — L97.919 VENOUS ULCER OF RIGHT LOWER EXTREMITY WITH VARICOSE VEINS (HCC): ICD-10-CM

## 2022-05-19 PROCEDURE — 93971 EXTREMITY STUDY: CPT | Performed by: SURGERY

## 2022-05-19 PROCEDURE — 93971 EXTREMITY STUDY: CPT

## 2022-05-24 NOTE — TELEPHONE ENCOUNTER
Authorization requirements reviewed  Please refer to [address] [employer] number [id number] for case updates

## 2022-06-01 ENCOUNTER — OFFICE VISIT (OUTPATIENT)
Dept: WOUND CARE | Facility: HOSPITAL | Age: 62
End: 2022-06-01
Payer: COMMERCIAL

## 2022-06-01 VITALS
TEMPERATURE: 96.4 F | HEART RATE: 61 BPM | DIASTOLIC BLOOD PRESSURE: 86 MMHG | RESPIRATION RATE: 16 BRPM | SYSTOLIC BLOOD PRESSURE: 192 MMHG

## 2022-06-01 DIAGNOSIS — L97.912 NON-PRESSURE CHRONIC ULCER OF RIGHT LOWER LEG WITH FAT LAYER EXPOSED (HCC): Primary | ICD-10-CM

## 2022-06-01 DIAGNOSIS — I87.311 CHRONIC VENOUS HYPERTENSION (IDIOPATHIC) WITH ULCER OF RIGHT LOWER EXTREMITY (HCC): ICD-10-CM

## 2022-06-01 DIAGNOSIS — L97.919 CHRONIC VENOUS HYPERTENSION (IDIOPATHIC) WITH ULCER OF RIGHT LOWER EXTREMITY (HCC): ICD-10-CM

## 2022-06-01 PROCEDURE — 99213 OFFICE O/P EST LOW 20 MIN: CPT | Performed by: ORTHOPAEDIC SURGERY

## 2022-06-01 PROCEDURE — 99212 OFFICE O/P EST SF 10 MIN: CPT | Performed by: ORTHOPAEDIC SURGERY

## 2022-06-01 RX ORDER — SODIUM HYPOCHLORITE 2.5 MG/ML
1 SOLUTION TOPICAL DAILY
Qty: 473 ML | Refills: 0 | Status: SHIPPED | OUTPATIENT
Start: 2022-06-01 | End: 2022-06-01 | Stop reason: CLARIF

## 2022-06-01 NOTE — PATIENT INSTRUCTIONS
Orders Placed This Encounter   Procedures    Wound cleansing and dressings     Your wound is healed  Skin if fragile, continue good daily skin care  Shower and pat dry, moisturize daily   Keep area covered for 1 week for protection (allevyn silicone bordered foam applied at wound care today)  Wear compression stockings daily  Keep all vascular appointments  Call if you notice any drainage or wound reopens     Thank you for choosing [facility] wound care     Standing Status:   Future     Standing Expiration Date:   6/1/2023

## 2022-06-01 NOTE — PROGRESS NOTES
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 ([address] )  L97 912 Wound cleansing and dressings   2  Chronic venous hypertension (idiopathic) with ulcer of right lower extremity (HCC)  I87 311     L99 455         Assessment and Plan :  · RLE Venous ulcer is healed  Keep the area covered and protected for about another week   Discussed the importance of long-term edema control and use of long-term compression  Recommended 15-20 mmHg compression stockings  Proceed with endovenous laser ablation of the right greater saphenous vein with multiple stab phlebectomies by Dr  Rosiland Shaper on 6/24/22     Counseled on importance of frequent elevation of leg and increase exercise/walking  Moisturize skin daily with skin nourishing cream    Follow up as needed or call with questions or concerns    Subjective:   Patient presents for followup of RLE venous ulcer  No issues or complaints  Pt is scheduled for an endovenous laser ablation of the right greater saphenous vein with multiple stab phlebectomies on June 24, 2022 with Dr  Rosiland Shaper  Pt  has been using Dermagran on the wound bed and compression stockings   Pt denies any SOB, calf pain, fevers or chills  Pt is accompanied by her daughter who is actively involved in her care      The following portions of the patient's history were reviewed and updated as appropriate:   Patient Active Problem List   Diagnosis    Cellulitis of right anterior lower leg    Primary hypertension    Venous ulcer of right lower extremity with varicose veins (HCC)    Chronic venous hypertension (idiopathic) with ulcer of right lower extremity ([address] )    COVID-19    Elevated liver enzymes    Hypokalemia    Sepsis ([address] )    Diarrhea    Thrombophlebitis     Past Medical History:   Diagnosis Date    Hypertension     Ulcer of right shin ([address] )      No past surgical history on file  Family History   Problem Relation Age of Onset    No Known Problems Mother     No Known Problems Father     No Known Problems Sister     No Known Problems Brother     No Known Problems Son     No Known Problems Daughter     No Known Problems Maternal Grandmother     No Known Problems Maternal Grandfather     No Known Problems Paternal Grandmother     No Known Problems Paternal Grandfather     No Known Problems Maternal Aunt     No Known Problems Maternal Uncle     No Known Problems Paternal Aunt     No Known Problems Paternal Uncle     No Known Problems Cousin      Social History     Socioeconomic History    Marital status: /Civil Union     Spouse name: None    Number of children: None    Years of education: None    Highest education level: None   Occupational History    None   Tobacco Use    Smoking status: Never Smoker    Smokeless tobacco: Never Used   Vaping Use    Vaping Use: Never used   Substance and Sexual Activity    Alcohol use: Not Currently    Drug use: Never    Sexual activity: Not Currently   Other Topics Concern    None   Social History Narrative    None     Social Determinants of Health     Financial Resource Strain: Not on file   Food Insecurity: No Food Insecurity    Worried About Running Out of Food in the Last Year: Never true    Bryce of Food in the Last Year: Never true   Transportation Needs: No Transportation Needs    Lack of Transportation (Medical): No    Lack of Transportation (Non-Medical):  No   Physical Activity: Not on file   Stress: Not on file   Social Connections: Not on file   Intimate Partner Violence: Not on file   Housing Stability: Low Risk     Unable to Pay for Housing in the Last Year: No    Number of Places Lived in the Last Year: 1    Unstable Housing in the Last Year: No Current Outpatient Medications:     Acetaminophen 325 MG CAPS, Take 3 capsules (975 mg total) by mouth every 8 (eight) hours as needed (mild pain), Disp: 30 capsule, Rfl: 0    betamethasone dipropionate (DIPROSONE) 0 05 % cream, Apply topically as needed (for redness), Disp: 30 g, Rfl: 0    traMADol (ULTRAM) 50 mg tablet, Take 1 tablet (50 mg total) by mouth every 6 (six) hours as needed for moderate pain, Disp: 30 tablet, Rfl: 0    Review of Systems   Constitutional: Negative  HENT: Negative for ear pain and hearing loss  Eyes: Negative for pain  Respiratory: Negative for chest tightness and shortness of breath  Cardiovascular: Positive for leg swelling  Negative for chest pain and palpitations  Gastrointestinal: Negative for diarrhea, nausea and vomiting  Genitourinary: Negative for dysuria  Musculoskeletal: Negative for gait problem  Skin: Positive for wound (RLE)  Neurological: Negative for tremors and weakness  Psychiatric/Behavioral: Negative for behavioral problems, confusion and suicidal ideas  Objective:  BP (!) 192/86   Pulse 61   Temp (!) 96 4 °F (35 8 °C)   Resp 16         Physical Exam  Vitals reviewed  Constitutional:       General: She is not in acute distress  Appearance: Normal appearance  She is well-developed  She is obese  HENT:      Head: Normocephalic and atraumatic  Cardiovascular:      Rate and Rhythm: Normal rate  Pulmonary:      Effort: Pulmonary effort is normal    Musculoskeletal:         General: No deformity  Right lower leg: Edema present  Left lower leg: No edema  Skin:     General: Skin is warm and dry  Findings: Wound (RLE Venous ulcer) present  Papular rash: Healed  Comments: Healed  See wound assessment   Neurological:      General: No focal deficit present  Mental Status: She is alert and oriented to person, place, and time        Gait: Gait normal    Psychiatric:         Mood and Affect: Mood and affect normal          Behavior: Behavior normal  Behavior is cooperative  Wound 12/15/21 Venous Ulcer Pretibial Distal;Right (Active)   Wound Image Images linked 06/01/22 0827   Wound Description Epithelialization 06/01/22 0831   Wound Length (cm) 0 cm 06/01/22 0831   Wound Width (cm) 0 cm 06/01/22 0831   Wound Depth (cm) 0 cm 06/01/22 0831   Wound Surface Area (cm^2) 0 cm^2 06/01/22 0831   Wound Volume (cm^3) 0 cm^3 06/01/22 0831   Calculated Wound Volume (cm^3) 0 cm^3 06/01/22 0831   Change in Wound Size % 100 06/01/22 0831   Drainage Amount None 06/01/22 0831     Results from last 6 Months   Lab Units 12/15/21  0951   WOUND CULTURE  4+ Growth of Staphylococcus aureus*  3+ Growth of Beta Hemolytic Streptococcus Group C*       Wound Instructions:  Orders Placed This Encounter   Procedures    Wound cleansing and dressings     Your wound is healed  Skin if fragile, continue good daily skin care  Shower and pat dry, moisturize daily   Keep area covered for 1 week for protection (allevyn silicone bordered foam applied at wound care today)  Wear compression stockings daily  Keep all vascular appointments  Call if you notice any drainage or wound reopens  Thank you for choosing [facility] wound care     Standing Status:   Future     Standing Expiration Date:   6/1/2023       Melissa Mcpherson PA-C, [facility]      Portions of the record may have been created with voice recognition software  Occasional wrong word or "sound alike" substitutions may have occurred due to the inherent limitations of voice recognition software  Read the chart carefully and recognize, using context, where substitutions have occurred

## 2022-06-01 NOTE — H&P (VIEW-ONLY)
Patient ID: Anahi Valente is a 64 y o  female Date of Birth 1960       Chief Complaint   Patient presents with    Follow Up Wound Care Visit     Right leg        Allergies:  Wound dressing adhesive    Diagnosis:   Diagnosis ICD-10-CM Associated Orders   1  Non-pressure chronic ulcer of right lower leg with fat layer exposed ([facility] [address] )  L97 912 Wound cleansing and dressings   2  Chronic venous hypertension (idiopathic) with ulcer of right lower extremity (HCC)  I87 311     L90 249         Assessment and Plan :  · RLE Venous ulcer is healed  Keep the area covered and protected for about another week   Discussed the importance of long-term edema control and use of long-term compression  Recommended 15-20 mmHg compression stockings  Proceed with endovenous laser ablation of the right greater saphenous vein with multiple stab phlebectomies by Dr Aldair Constantino on 6/24/22     Counseled on importance of frequent elevation of leg and increase exercise/walking  Moisturize skin daily with skin nourishing cream    Follow up as needed or call with questions or concerns    Subjective:   Patient presents for followup of RLE venous ulcer  No issues or complaints  Pt is scheduled for an endovenous laser ablation of the right greater saphenous vein with multiple stab phlebectomies on June 24, 2022 with Dr Aldair Constantino  Pt  has been using Dermagran on the wound bed and compression stockings   Pt denies any SOB, calf pain, fevers or chills  Pt is accompanied by her daughter who is actively involved in her care      The following portions of the patient's history were reviewed and updated as appropriate:   Patient Active Problem List   Diagnosis    Cellulitis of right anterior lower leg    Primary hypertension    Venous ulcer of right lower extremity with varicose veins (HCC)    Chronic venous hypertension (idiopathic) with ulcer of right lower extremity ([address] )    COVID-19    Elevated liver enzymes    Hypokalemia    Sepsis ([facility] [address] )    Diarrhea    Thrombophlebitis     Past Medical History:   Diagnosis Date    Hypertension     Ulcer of right shin ([address] )      No past surgical history on file  Family History   Problem Relation Age of Onset    No Known Problems Mother     No Known Problems Father     No Known Problems Sister     No Known Problems Brother     No Known Problems Son     No Known Problems Daughter     No Known Problems Maternal Grandmother     No Known Problems Maternal Grandfather     No Known Problems Paternal Grandmother     No Known Problems Paternal Grandfather     No Known Problems Maternal Aunt     No Known Problems Maternal Uncle     No Known Problems Paternal Aunt     No Known Problems Paternal Uncle     No Known Problems Cousin      Social History     Socioeconomic History    Marital status: /Civil Union     Spouse name: None    Number of children: None    Years of education: None    Highest education level: None   Occupational History    None   Tobacco Use    Smoking status: Never Smoker    Smokeless tobacco: Never Used   Vaping Use    Vaping Use: Never used   Substance and Sexual Activity    Alcohol use: Not Currently    Drug use: Never    Sexual activity: Not Currently   Other Topics Concern    None   Social History Narrative    None     Social Determinants of Health     Financial Resource Strain: Not on file   Food Insecurity: No Food Insecurity    Worried About Running Out of Food in the Last Year: Never true    Bryce of Food in the Last Year: Never true   Transportation Needs: No Transportation Needs    Lack of Transportation (Medical): No    Lack of Transportation (Non-Medical):  No   Physical Activity: Not on file   Stress: Not on file   Social Connections: Not on file   Intimate Partner Violence: Not on file   Housing Stability: Low Risk     Unable to Pay for Housing in the Last Year: No    Number of Places Lived in the Last Year: 1    Unstable Housing in the Last Year: No Current Outpatient Medications:     Acetaminophen 325 MG CAPS, Take 3 capsules (975 mg total) by mouth every 8 (eight) hours as needed (mild pain), Disp: 30 capsule, Rfl: 0    betamethasone dipropionate (DIPROSONE) 0 05 % cream, Apply topically as needed (for redness), Disp: 30 g, Rfl: 0    traMADol (ULTRAM) 50 mg tablet, Take 1 tablet (50 mg total) by mouth every 6 (six) hours as needed for moderate pain, Disp: 30 tablet, Rfl: 0    Review of Systems   Constitutional: Negative  HENT: Negative for ear pain and hearing loss  Eyes: Negative for pain  Respiratory: Negative for chest tightness and shortness of breath  Cardiovascular: Positive for leg swelling  Negative for chest pain and palpitations  Gastrointestinal: Negative for diarrhea, nausea and vomiting  Genitourinary: Negative for dysuria  Musculoskeletal: Negative for gait problem  Skin: Positive for wound (RLE)  Neurological: Negative for tremors and weakness  Psychiatric/Behavioral: Negative for behavioral problems, confusion and suicidal ideas  Objective:  BP (!) 192/86   Pulse 61   Temp (!) 96 4 °F (35 8 °C)   Resp 16         Physical Exam  Vitals reviewed  Constitutional:       General: She is not in acute distress  Appearance: Normal appearance  She is well-developed  She is obese  HENT:      Head: Normocephalic and atraumatic  Cardiovascular:      Rate and Rhythm: Normal rate  Pulmonary:      Effort: Pulmonary effort is normal    Musculoskeletal:         General: No deformity  Right lower leg: Edema present  Left lower leg: No edema  Skin:     General: Skin is warm and dry  Findings: Wound (RLE Venous ulcer) present  Papular rash: Healed  Comments: Healed  See wound assessment   Neurological:      General: No focal deficit present  Mental Status: She is alert and oriented to person, place, and time        Gait: Gait normal    Psychiatric:         Mood and Affect: Mood and affect normal          Behavior: Behavior normal  Behavior is cooperative  Wound 12/15/21 Venous Ulcer Pretibial Distal;Right (Active)   Wound Image Images linked 06/01/22 0827   Wound Description Epithelialization 06/01/22 0831   Wound Length (cm) 0 cm 06/01/22 0831   Wound Width (cm) 0 cm 06/01/22 0831   Wound Depth (cm) 0 cm 06/01/22 0831   Wound Surface Area (cm^2) 0 cm^2 06/01/22 0831   Wound Volume (cm^3) 0 cm^3 06/01/22 0831   Calculated Wound Volume (cm^3) 0 cm^3 06/01/22 0831   Change in Wound Size % 100 06/01/22 0831   Drainage Amount None 06/01/22 0831     Results from last 6 Months   Lab Units 12/15/21  0951   WOUND CULTURE  4+ Growth of Staphylococcus aureus*  3+ Growth of Beta Hemolytic Streptococcus Group C*       Wound Instructions:  Orders Placed This Encounter   Procedures    Wound cleansing and dressings     Your wound is healed  Skin if fragile, continue good daily skin care  Shower and pat dry, moisturize daily   Keep area covered for 1 week for protection (allevyn silicone bordered foam applied at wound care today)  Wear compression stockings daily  Keep all vascular appointments  Call if you notice any drainage or wound reopens  Thank you for choosing [facility] wound care     Standing Status:   Future     Standing Expiration Date:   6/1/2023       Bryanna rBown PA-C, [facility]      Portions of the record may have been created with voice recognition software  Occasional wrong word or "sound alike" substitutions may have occurred due to the inherent limitations of voice recognition software  Read the chart carefully and recognize, using context, where substitutions have occurred

## 2022-06-20 NOTE — PRE-PROCEDURE INSTRUCTIONS
Pre-Surgery Instructions:   Medication Instructions    Acetaminophen 325 MG CAPS Uses PRN- OK to take day of surgery    traMADol (ULTRAM) 50 mg tablet Uses PRN- OK to take day of surgery    You will receive a phone call from hospital for arrival time  Please call surgeons office if any changes in your condition  Wear easy on/off clothing; consider type of surgery;  Valuables, jewelry, piercing's please keep at home  **COVID-19  education/surgical guidelines  Updated covid    Visitation policy  Please: No contact lenses or eye make up, artificial eyelashes    Please secure transportation     Follow pre surgery showering or cleaning instructions as  Reviewed by nurse or surgeons office      Questions answered and concerns addressed

## 2022-06-24 ENCOUNTER — ANESTHESIA (OUTPATIENT)
Dept: PERIOP | Facility: HOSPITAL | Age: 62
End: 2022-06-24
Payer: COMMERCIAL

## 2022-06-24 ENCOUNTER — HOSPITAL ENCOUNTER (OUTPATIENT)
Facility: HOSPITAL | Age: 62
Setting detail: OUTPATIENT SURGERY
Discharge: HOME | End: 2022-06-24
Attending: SURGERY | Admitting: SURGERY
Payer: COMMERCIAL

## 2022-06-24 ENCOUNTER — ANESTHESIA EVENT (OUTPATIENT)
Dept: PERIOP | Facility: HOSPITAL | Age: 62
End: 2022-06-24
Payer: COMMERCIAL

## 2022-06-24 ENCOUNTER — HOSPITAL ENCOUNTER (OUTPATIENT)
Dept: NON INVASIVE DIAGNOSTICS | Facility: HOSPITAL | Age: 62
Discharge: HOME | End: 2022-06-24
Payer: COMMERCIAL

## 2022-06-24 VITALS
HEIGHT: 63 IN | TEMPERATURE: 98 F | RESPIRATION RATE: 18 BRPM | DIASTOLIC BLOOD PRESSURE: 86 MMHG | OXYGEN SATURATION: 98 % | SYSTOLIC BLOOD PRESSURE: 147 MMHG | HEART RATE: 70 BPM | BODY MASS INDEX: 38.45 KG/M2 | WEIGHT: 217 LBS

## 2022-06-24 DIAGNOSIS — L97.919 VENOUS ULCER OF RIGHT LOWER EXTREMITY WITH VARICOSE VEINS (HCC): Primary | ICD-10-CM

## 2022-06-24 DIAGNOSIS — I83.019 VENOUS ULCER OF RIGHT LOWER EXTREMITY WITH VARICOSE VEINS (HCC): Primary | ICD-10-CM

## 2022-06-24 DIAGNOSIS — I83.891 VARICOSE VEINS OF RIGHT LOWER EXTREMITY WITH OTHER COMPLICATIONS: ICD-10-CM

## 2022-06-24 PROCEDURE — NC001 PR NO CHARGE: Performed by: SURGERY

## 2022-06-24 PROCEDURE — 36478 ENDOVENOUS LASER 1ST VEIN: CPT | Performed by: PHYSICIAN ASSISTANT

## 2022-06-24 PROCEDURE — 37765 STAB PHLEB VEINS XTR 10-20: CPT | Performed by: SURGERY

## 2022-06-24 PROCEDURE — 37765 STAB PHLEB VEINS XTR 10-20: CPT | Performed by: PHYSICIAN ASSISTANT

## 2022-06-24 PROCEDURE — 93971 EXTREMITY STUDY: CPT

## 2022-06-24 PROCEDURE — 36478 ENDOVENOUS LASER 1ST VEIN: CPT | Performed by: SURGERY

## 2022-06-24 RX ORDER — MEPERIDINE HYDROCHLORIDE 25 MG/ML
12.5 INJECTION INTRAMUSCULAR; INTRAVENOUS; SUBCUTANEOUS
Status: DISCONTINUED | OUTPATIENT
Start: 2022-06-24 | End: 2022-06-24 | Stop reason: HOSPADM

## 2022-06-24 RX ORDER — SODIUM CHLORIDE, SODIUM LACTATE, POTASSIUM CHLORIDE, CALCIUM CHLORIDE 600; 310; 30; 20 MG/100ML; MG/100ML; MG/100ML; MG/100ML
INJECTION, SOLUTION INTRAVENOUS CONTINUOUS PRN
Status: DISCONTINUED | OUTPATIENT
Start: 2022-06-24 | End: 2022-06-24

## 2022-06-24 RX ORDER — CEFAZOLIN SODIUM 2 G/50ML
2000 SOLUTION INTRAVENOUS ONCE
Status: COMPLETED | OUTPATIENT
Start: 2022-06-24 | End: 2022-06-24

## 2022-06-24 RX ORDER — SODIUM CHLORIDE, SODIUM LACTATE, POTASSIUM CHLORIDE, CALCIUM CHLORIDE 600; 310; 30; 20 MG/100ML; MG/100ML; MG/100ML; MG/100ML
125 INJECTION, SOLUTION INTRAVENOUS CONTINUOUS
Status: DISCONTINUED | OUTPATIENT
Start: 2022-06-24 | End: 2022-06-24 | Stop reason: HOSPADM

## 2022-06-24 RX ORDER — OXYCODONE HYDROCHLORIDE AND ACETAMINOPHEN 5; 325 MG/1; MG/1
1 TABLET ORAL EVERY 4 HOURS PRN
Status: DISCONTINUED | OUTPATIENT
Start: 2022-06-24 | End: 2022-06-24 | Stop reason: HOSPADM

## 2022-06-24 RX ORDER — OXYCODONE HYDROCHLORIDE AND ACETAMINOPHEN 5; 325 MG/1; MG/1
1 TABLET ORAL EVERY 4 HOURS PRN
Qty: 12 TABLET | Refills: 0 | Status: SHIPPED | OUTPATIENT
Start: 2022-06-24 | End: 2022-07-04

## 2022-06-24 RX ORDER — DEXAMETHASONE SODIUM PHOSPHATE 10 MG/ML
INJECTION, SOLUTION INTRAMUSCULAR; INTRAVENOUS AS NEEDED
Status: DISCONTINUED | OUTPATIENT
Start: 2022-06-24 | End: 2022-06-24

## 2022-06-24 RX ORDER — ONDANSETRON 2 MG/ML
4 INJECTION INTRAMUSCULAR; INTRAVENOUS ONCE AS NEEDED
Status: DISCONTINUED | OUTPATIENT
Start: 2022-06-24 | End: 2022-06-24 | Stop reason: HOSPADM

## 2022-06-24 RX ORDER — MIDAZOLAM HYDROCHLORIDE 2 MG/2ML
INJECTION, SOLUTION INTRAMUSCULAR; INTRAVENOUS AS NEEDED
Status: DISCONTINUED | OUTPATIENT
Start: 2022-06-24 | End: 2022-06-24

## 2022-06-24 RX ORDER — HYDROMORPHONE HCL/PF 1 MG/ML
0.5 SYRINGE (ML) INJECTION
Status: DISCONTINUED | OUTPATIENT
Start: 2022-06-24 | End: 2022-06-24 | Stop reason: HOSPADM

## 2022-06-24 RX ORDER — ONDANSETRON 2 MG/ML
4 INJECTION INTRAMUSCULAR; INTRAVENOUS EVERY 6 HOURS PRN
Status: DISCONTINUED | OUTPATIENT
Start: 2022-06-24 | End: 2022-06-24 | Stop reason: HOSPADM

## 2022-06-24 RX ORDER — SODIUM CHLORIDE, SODIUM LACTATE, POTASSIUM CHLORIDE, CALCIUM CHLORIDE 600; 310; 30; 20 MG/100ML; MG/100ML; MG/100ML; MG/100ML
100 INJECTION, SOLUTION INTRAVENOUS CONTINUOUS
Status: DISCONTINUED | OUTPATIENT
Start: 2022-06-24 | End: 2022-06-24 | Stop reason: HOSPADM

## 2022-06-24 RX ORDER — LIDOCAINE HYDROCHLORIDE 10 MG/ML
INJECTION, SOLUTION EPIDURAL; INFILTRATION; INTRACAUDAL; PERINEURAL AS NEEDED
Status: DISCONTINUED | OUTPATIENT
Start: 2022-06-24 | End: 2022-06-24

## 2022-06-24 RX ORDER — FENTANYL CITRATE 50 UG/ML
INJECTION, SOLUTION INTRAMUSCULAR; INTRAVENOUS AS NEEDED
Status: DISCONTINUED | OUTPATIENT
Start: 2022-06-24 | End: 2022-06-24

## 2022-06-24 RX ORDER — PROMETHAZINE HYDROCHLORIDE 25 MG/ML
12.5 INJECTION, SOLUTION INTRAMUSCULAR; INTRAVENOUS ONCE AS NEEDED
Status: DISCONTINUED | OUTPATIENT
Start: 2022-06-24 | End: 2022-06-24 | Stop reason: HOSPADM

## 2022-06-24 RX ORDER — PROPOFOL 10 MG/ML
INJECTION, EMULSION INTRAVENOUS AS NEEDED
Status: DISCONTINUED | OUTPATIENT
Start: 2022-06-24 | End: 2022-06-24

## 2022-06-24 RX ORDER — CHLORHEXIDINE GLUCONATE 0.12 MG/ML
15 RINSE ORAL ONCE
Status: COMPLETED | OUTPATIENT
Start: 2022-06-24 | End: 2022-06-24

## 2022-06-24 RX ORDER — ALBUTEROL SULFATE 2.5 MG/3ML
2.5 SOLUTION RESPIRATORY (INHALATION) ONCE AS NEEDED
Status: DISCONTINUED | OUTPATIENT
Start: 2022-06-24 | End: 2022-06-24 | Stop reason: HOSPADM

## 2022-06-24 RX ORDER — FENTANYL CITRATE/PF 50 MCG/ML
25 SYRINGE (ML) INJECTION
Status: DISCONTINUED | OUTPATIENT
Start: 2022-06-24 | End: 2022-06-24 | Stop reason: HOSPADM

## 2022-06-24 RX ORDER — ONDANSETRON 2 MG/ML
INJECTION INTRAMUSCULAR; INTRAVENOUS AS NEEDED
Status: DISCONTINUED | OUTPATIENT
Start: 2022-06-24 | End: 2022-06-24

## 2022-06-24 RX ADMIN — CHLORHEXIDINE GLUCONATE 15 ML: 1.2 SOLUTION ORAL at 07:00

## 2022-06-24 RX ADMIN — FENTANYL CITRATE 25 MCG: 50 INJECTION, SOLUTION INTRAMUSCULAR; INTRAVENOUS at 08:12

## 2022-06-24 RX ADMIN — LIDOCAINE HYDROCHLORIDE 50 MG: 10 INJECTION, SOLUTION EPIDURAL; INFILTRATION; INTRACAUDAL at 07:36

## 2022-06-24 RX ADMIN — CEFAZOLIN SODIUM 2000 MG: 2 SOLUTION INTRAVENOUS at 07:38

## 2022-06-24 RX ADMIN — DEXAMETHASONE SODIUM PHOSPHATE 10 MG: 10 INJECTION, SOLUTION INTRAMUSCULAR; INTRAVENOUS at 07:36

## 2022-06-24 RX ADMIN — FENTANYL CITRATE 25 MCG: 50 INJECTION, SOLUTION INTRAMUSCULAR; INTRAVENOUS at 08:13

## 2022-06-24 RX ADMIN — FENTANYL CITRATE 25 MCG: 50 INJECTION, SOLUTION INTRAMUSCULAR; INTRAVENOUS at 07:45

## 2022-06-24 RX ADMIN — SODIUM CHLORIDE, SODIUM LACTATE, POTASSIUM CHLORIDE, AND CALCIUM CHLORIDE: .6; .31; .03; .02 INJECTION, SOLUTION INTRAVENOUS at 07:25

## 2022-06-24 RX ADMIN — FENTANYL CITRATE 25 MCG: 50 INJECTION, SOLUTION INTRAMUSCULAR; INTRAVENOUS at 09:19

## 2022-06-24 RX ADMIN — PROPOFOL 200 MG: 10 INJECTION, EMULSION INTRAVENOUS at 07:36

## 2022-06-24 RX ADMIN — ONDANSETRON 4 MG: 2 INJECTION INTRAMUSCULAR; INTRAVENOUS at 07:36

## 2022-06-24 RX ADMIN — MIDAZOLAM 2 MG: 1 INJECTION INTRAMUSCULAR; INTRAVENOUS at 07:29

## 2022-06-24 RX ADMIN — FENTANYL CITRATE 25 MCG: 50 INJECTION, SOLUTION INTRAMUSCULAR; INTRAVENOUS at 08:15

## 2022-06-24 RX ADMIN — FENTANYL CITRATE 25 MCG: 50 INJECTION, SOLUTION INTRAMUSCULAR; INTRAVENOUS at 09:07

## 2022-06-24 NOTE — ANESTHESIA PREPROCEDURE EVALUATION
Procedure:  ENDOVASCULAR LASER THERAPY (EVLT) right leg with multiple stab phlebectomies (Right Leg Upper)  MULTIPLE STAB PHLEBECTOMIES (Right Leg Upper)    Relevant Problems   CARDIO   (+) Chronic venous hypertension (idiopathic) with ulcer of right lower extremity (HCC)   (+) Primary hypertension   (+) Venous ulcer of right lower extremity with varicose veins (HCC)        Physical Exam    Airway    Mallampati score: II  TM Distance: >3 FB  Neck ROM: full     Dental       Cardiovascular  Cardiovascular exam normal    Pulmonary  Pulmonary exam normal     Other Findings        Anesthesia Plan  ASA Score- 2     Anesthesia Type- general with ASA Monitors  Additional Monitors:   Airway Plan: LMA  Plan Factors-Exercise tolerance (METS): >4 METS  Chart reviewed  EKG reviewed  Imaging results reviewed  Existing labs reviewed  Patient summary reviewed  Patient is not a current smoker  Patient did not smoke on day of surgery  Obstructive sleep apnea risk education given perioperatively  Induction- intravenous  Postoperative Plan- Plan for postoperative opioid use  Planned trial extubation    Informed Consent- Anesthetic plan and risks discussed with patient  I personally reviewed this patient with the CRNA  Discussed and agreed on the Anesthesia Plan with the CRNA  Danny Pastor

## 2022-06-24 NOTE — INTERVAL H&P NOTE
H&P reviewed  After examining the patient I find no changes in the patients condition since the H&P had been written      Plan:  Endovenous laser ablation right greater saphenous vein with multiple stab phlebectomies    Vitals:    06/24/22 0638   BP:    Pulse:    Resp: 13   Temp:    SpO2:

## 2022-06-24 NOTE — ANESTHESIA POSTPROCEDURE EVALUATION
Post-Op Assessment Note    CV Status:  Stable  Pain Score: 0    Pain management: adequate     Mental Status:  Alert and awake   Hydration Status:  Euvolemic   PONV Controlled:  Controlled   Airway Patency:  Patent      Post Op Vitals Reviewed: Yes      Staff: Anesthesiologist, CRNA         No complications documented      BP   146/67   Temp  97 8   Pulse  73   Resp  16   SpO2   99

## 2022-06-24 NOTE — OP NOTE
OPERATIVE REPORT  PATIENT NAME: Ej Cota    :  1960  MRN: [id number]  Pt Location:  OR ROOM 01    SURGERY DATE: 2022    Surgeon(s) and Role:     * Isidro Colvin MD - Primary     * Lauro Pagan PA-C - Assisting    Preop Diagnosis:  Venous ulcer of right lower extremity with varicose veins ([facility] [address] ) [I83 019, L97 919]    Post-Op Diagnosis Codes: * Venous ulcer of right lower extremity with varicose veins (HCC) [I83 019, L97 919]    Procedure(s) (LRB):  ENDOVASCULAR LASER THERAPY (EVLT) right leg with multiple stab phlebectomies (Right)  MULTIPLE STAB PHLEBECTOMIES, 10 stabs (Right)    Specimen(s):  * No specimens in log *    Estimated Blood Loss:   Minimal    Drains:  * No LDAs found *    Anesthesia Type:   General    Operative Indications:  Venous ulcer of right lower extremity with varicose veins ([facility] [address] ) [I83 019, L97 919]      Operative Findings:  See op report    Complications:   None    Procedure and Technique:      The patient Ej Cota was identified in the operating room after adequate laryngeal mask anesthesia was obtained the right  leg was prepped and draped using Betadine prep and Sterile drapes  Under ultrasound guidance saphenous vein was identified, micropuncture needle wire and catheter were placed, J-wire to the saphenofemoral junction followed by the laser sheath  The laser was engaged at 6 5 W of power for proximally 60 seconds  Completion duplex imaging showed wide patency of the common femoral vein with no evidence of deep vein thrombosis  The saphenous vein was obliterated along its course  Multiple stab phebectomies 10-20 were performed, varicosities were excised  Quarter-inch Steri-Strips were placed and a sterile compressive dressing was applied Estimated blood loss was minimal  The patient was taken to the recovery room in stable condition    A qualified resident physician was not available   I was present for the entire procedure, A qualified resident physician was not available and A physician assistant was required during the procedure for retraction tissue handling,dissection and suturing    Patient Disposition:  PACU  and hemodynamically stable      SIGNATURE: Saúl Rosa MD  DATE: June 24, 2022  TIME: 8:48 AM

## 2022-06-24 NOTE — DISCHARGE INSTRUCTIONS
DISCHARGE INSTRUCTIONS  VARICOSE VEIN SURGERY    ACTIVITY:  On the day of your operation, take it easy  You can take short walks around the house  When sitting, the leg should be elevated  The preferred position is to have the leg at or above the level of the heart  Starting on the first day after surgery, light walking is encouraged as tolerated  After your ultrasound test, you can resume your normal activity, but no heavy lifting (do not lift more than 15 pounds) or strenuous exercise for 2 weeks  You should not drive a car until your bandages are removed and you are off all narcotic pain medication  You may ride in a car  DIET: Resume your normal diet  Good nutrition is important for healing of your incision  DRESSINGS/SURGICAL SITE:  When released from the hospital, you should have a compression bandage in place on the operated leg  This bandage should feel snug, but not too tight  If the bandage becomes blood soaked or painfully tight, elevate your leg and call the office ([phone])  You may have surgical glue on your incision sites  There are stitches present under the skin which will absorb on their own  The glue is used to cover the incision, assist in closure, and prevent contamination  This adhesive will darken and peel away on its own within one to two weeks  Do not pick at it  You should shower daily  Wash incisions daily with soap and water, but do not rub or scrub the incisions; rinse thoroughly and pat dry  If the operated leg becomes increasingly painful or swollen, or if there is increasing redness or pain around your incision, contact our office  Some bruising of the skin is common after varicose vein surgery  This can be lessened by elevation of the leg  Many patients will notice some numbness of the shin, ankle, calf, or the top of the foot  This usually improves with time but may be persistent    After surgery you can expect bruising, swelling and hard knots on your leg  As your body heals the bruising will fade and the swelling and knots will subside  Apply sunscreen with SPF 30 to incisions while sun bathing for up to one year after surgery to reduce the chances of your incisions darkening  FOLLOW UP STUDIES:  Your first post-operative appointment will be 2-3 days after your surgery  At this appointment your bandages will be removed, and you will be seen by a Vascular Surgeon, Nurse Practitioner, or Physician Assistant  An appointment for a follow up Doppler ultrasound study will be scheduled on the same day as your follow up appointment  FOLLOW UP APPOINTMENTS:  Making and keeping follow up appointments and ultrasound tests are important to your recovery  If you have difficulty making it to or keeping your follow up appointments, call the office  If you have increased pain, fever >101 5, increased drainage, redness or a bad smell at your surgery site, new coldness/numbness of your arm or leg, please call us immediately and GO directly to the ER  PLEASE CALL THE OFFICE IF YOU HAVE ANY QUESTIONS  [phone]  [phone]  [address] [place] [address] , [address], [place], [address]  [address], [address], [place], [address]  [address], [address], [address] [facility]  [address] [facility], [address] [facility], [facility], [address] [facility]  [address], [address]  [facility] [address], [address] [facility], [address] [facility] [address], [address] [facility] [address], [address] [facility], [address] [place] [address]  [address] [facility], [address] [facility], [address] [facility], [address]  [address] [facility], [address]  [address] [place] [address] [facility] [place] [address]

## 2022-06-27 ENCOUNTER — TELEPHONE (OUTPATIENT)
Dept: VASCULAR SURGERY | Facility: CLINIC | Age: 62
End: 2022-06-27

## 2022-06-27 ENCOUNTER — OFFICE VISIT (OUTPATIENT)
Dept: VASCULAR SURGERY | Facility: CLINIC | Age: 62
End: 2022-06-27

## 2022-06-27 ENCOUNTER — HOSPITAL ENCOUNTER (OUTPATIENT)
Dept: NON INVASIVE DIAGNOSTICS | Age: 62
Discharge: HOME | End: 2022-06-27
Attending: SURGERY
Payer: COMMERCIAL

## 2022-06-27 VITALS
WEIGHT: 217.2 LBS | HEIGHT: 63 IN | SYSTOLIC BLOOD PRESSURE: 160 MMHG | DIASTOLIC BLOOD PRESSURE: 78 MMHG | BODY MASS INDEX: 38.48 KG/M2 | HEART RATE: 78 BPM | OXYGEN SATURATION: 97 %

## 2022-06-27 DIAGNOSIS — I10 PRIMARY HYPERTENSION: Primary | ICD-10-CM

## 2022-06-27 DIAGNOSIS — I83.019 VENOUS ULCER OF RIGHT LOWER EXTREMITY WITH VARICOSE VEINS (HCC): ICD-10-CM

## 2022-06-27 DIAGNOSIS — L97.919 VENOUS ULCER OF RIGHT LOWER EXTREMITY WITH VARICOSE VEINS (HCC): ICD-10-CM

## 2022-06-27 PROCEDURE — 93971 EXTREMITY STUDY: CPT | Performed by: SURGERY

## 2022-06-27 PROCEDURE — 93971 EXTREMITY STUDY: CPT

## 2022-06-27 PROCEDURE — 99024 POSTOP FOLLOW-UP VISIT: CPT | Performed by: NURSE PRACTITIONER

## 2022-06-27 NOTE — PATIENT INSTRUCTIONS
Post op venous duplex today  Apply warm compress to right inner thigh and left calf 3x/day for the next week  Clean skin daily with soap and water  Resume prescription compression in 1 week  Use light sleeve until able to get back into compression   If any redness or cloudy drainage occurs at incision notify the office   If worsening or enlarging area of tenderness occurs to the left leg notify the office   Follow up in 4-6 week to recheck your legs  Recheck your blood pressure at home   If BPI >150/>100 notify your PCP for a blood pressure check

## 2022-06-27 NOTE — TELEPHONE ENCOUNTER
Received fax from [employer] that  Percocet requires prior auth  Fax was from 6/24/22  Called pt  She paid out of pocket for the medication, so the prior auth can be disregarded at this time

## 2022-06-27 NOTE — PROGRESS NOTES
Assessment/Plan:    Chronic venous hypertension (idiopathic) with ulcer of right lower extremity (HCC)  22-year-old with HTN, chronic venous insufficiency with right lower extremity venous ulceration, remote history of right GSV EVLT by Dr Burlene Gilford 15 years ago and now s/p right presumed anterior accessory saphenous vein laser ablation and stab phlebectomies X 10 by Dr Burlene Gilford 6/24/22  She presents to the office for postoperative visit   -Phlebectomy sites intact  Clear yellow tinged weeping from distal anterior lower leg phlebectomy site  Cleansed and reinforced with steri strip  -Post op venous duplex today  -Left calf mid calf tenderness to varicose vein x 1 day, phlebitis by exam   -Apply warm compress to right inner thigh and left calf 3x/day for the next week  -Resume prescription compression in 1 week  Use light tubi  sleeve until able to get back into compression   -If any redness or cloudy drainage occurs at incision notify the office or if worsening or enlarging area of tenderness occurs to the left leg notify the office   -Follow up in 4-6 week to recheck your legs       Primary hypertension  -BP elevated at started visit 170/100   -Recheck at end of visit by me 160/78   -She does relay a history of elevated blood pressure at office visits and daughter takes blood pressure at home with better readings  -Recheck your blood pressure at home  If BP >150/>100 notify your PCP for a blood pressure check       Diagnoses and all orders for this visit:    Primary hypertension          Subjective:      Patient ID: Erasmo Bajwa is a 64 y o  female  Patient is here today s/p a right EVLT with stab phlebectomies done 6/24/2022 by Dr Burlene Gilford  Dressing were removed  Patient has moderate bruising on her right shin and minimal swelling  Her stab sites are clean and dry  She denies any pain  Patient is a non-smoker       HPI  22-year-old with HTN, chronic venous insufficiency with right lower extremity venous ulceration, remote history of right GSV EVLT by Dr Zohreh Guajardo 15 years ago and now s/p right presumed anterior accessory saphenous vein laser ablation and stab phlebectomies X 10 by Dr Zohreh Guajardo 6/24/22  She presents to the office for postoperative visit   Pain well controlled  She did notice an area on the left mid medial calf vein that is tender to touch x1 day  Wearing prescription compression on the left leg  The following portions of the patient's history were reviewed and updated as appropriate: allergies, current medications, past family history, past medical history, past social history, past surgical history and problem list   ROS reviewed     Review of Systems   Constitutional: Negative  HENT: Negative  Eyes: Negative  Respiratory: Negative  Cardiovascular: Positive for leg swelling  Gastrointestinal: Negative  Endocrine: Negative  Genitourinary: Negative  Musculoskeletal: Positive for arthralgias  Skin: Negative  Allergic/Immunologic: Negative  Neurological: Negative  Hematological: Negative  Psychiatric/Behavioral: Negative  Objective:    I have reviewed and made appropriate changes to the review of systems input by the medical assistant      Vitals:    06/27/22 1445 06/27/22 1524   BP: 170/100 160/78   BP Location: Left arm Right arm   Patient Position: Sitting    Cuff Size: Standard Large   Pulse: 78    SpO2: 97%    Weight: 98 5 kg (217 lb 3 2 oz)    Height: 5' 3" (1 6 m)        Patient Active Problem List   Diagnosis    Cellulitis of right anterior lower leg    Primary hypertension    Venous ulcer of right lower extremity with varicose veins (HCC)    Chronic venous hypertension (idiopathic) with ulcer of right lower extremity (HCC)    COVID-19    Elevated liver enzymes    Hypokalemia    Sepsis (HCC)    Diarrhea    Thrombophlebitis       Past Surgical History:   Procedure Laterality Date    FRACTURE SURGERY Right 1984    with hardware    VT ENDOVENOUS LASER, 1ST VEIN Right 6/24/2022    Procedure: ENDOVASCULAR LASER THERAPY (EVLT) right leg with multiple stab phlebectomies;  Surgeon: Anna Pierce MD;  Location:  MAIN OR;  Service: Vascular    MD PHLEB VEINS - EXTREM 20+ Right 6/24/2022    Procedure: MULTIPLE STAB PHLEBECTOMIES, 10 stabs; Surgeon: Anna Pierce MD;  Location:  MAIN OR;  Service: Vascular       Family History   Problem Relation Age of Onset    No Known Problems Mother     No Known Problems Father     No Known Problems Sister     No Known Problems Brother     No Known Problems Daughter     No Known Problems Son     No Known Problems Maternal Aunt     No Known Problems Maternal Uncle     No Known Problems Paternal Aunt     No Known Problems Paternal Uncle     No Known Problems Maternal Grandmother     No Known Problems Maternal Grandfather     No Known Problems Paternal Grandmother     No Known Problems Paternal Grandfather     No Known Problems Cousin        Social History     Socioeconomic History    Marital status: /Civil Union     Spouse name: Not on file    Number of children: Not on file    Years of education: Not on file    Highest education level: Not on file   Occupational History    Not on file   Tobacco Use    Smoking status: Never Smoker    Smokeless tobacco: Never Used   Vaping Use    Vaping Use: Never used   Substance and Sexual Activity    Alcohol use: Not Currently    Drug use: Never    Sexual activity: Not Currently   Other Topics Concern    Not on file   Social History Narrative    Not on file     Social Determinants of Health     Financial Resource Strain: Not on file   Food Insecurity: No Food Insecurity    Worried About Running Out of Food in the Last Year: Never true    Bryce of Food in the Last Year: Never true   Transportation Needs: No Transportation Needs    Lack of Transportation (Medical): No    Lack of Transportation (Non-Medical):  No   Physical Activity: Not on file Stress: Not on file   Social Connections: Not on file   Intimate Partner Violence: Not on file   Housing Stability: [address] Unable to Pay for Housing in the Last Year: No    Number of Places Lived in the Last Year: 1    Unstable Housing in the Last Year: No       Allergies   Allergen Reactions    Wound Dressing Adhesive Rash     bandaid adhesives         Current Outpatient Medications:     Acetaminophen 325 MG CAPS, Take 3 capsules (975 mg total) by mouth every 8 (eight) hours as needed (mild pain), Disp: 30 capsule, Rfl: 0    betamethasone dipropionate (DIPROSONE) 0 05 % cream, Apply topically as needed (for redness), Disp: 30 g, Rfl: 0    oxyCODONE-acetaminophen (Percocet) 5-325 mg per tablet, Take 1 tablet by mouth every 4 (four) hours as needed for moderate pain for up to 10 days Max Daily Amount: 6 tablets, Disp: 12 tablet, Rfl: 0    traMADol (ULTRAM) 50 mg tablet, Take 1 tablet (50 mg total) by mouth every 6 (six) hours as needed for moderate pain, Disp: 30 tablet, Rfl: 0    /78 (BP Location: Right arm, Cuff Size: Large)   Pulse 78   Ht 5' 3" (1 6 m)   Wt 98 5 kg (217 lb 3 2 oz)   SpO2 97%   BMI 38 48 kg/m²          Physical Exam  Vitals and nursing note reviewed  Constitutional:       Appearance: Normal appearance  HENT:      Head: Normocephalic  Eyes:      Extraocular Movements: Extraocular movements intact  Cardiovascular:      Heart sounds: Normal heart sounds  Pulmonary:      Effort: Pulmonary effort is normal       Breath sounds: Normal breath sounds  Musculoskeletal:      Comments: Trace right leg swelling and short segment palpable cord left mid medial calf    Skin:     Comments: Phleb sites intact with steri strips  Clear yellow tinged weeping from distal anterior shin phlebectomy site  Reinforced with steri strip    Neurological:      General: No focal deficit present  Mental Status: She is alert and oriented to person, place, and time     Psychiatric: Behavior: Behavior normal

## 2022-06-27 NOTE — ASSESSMENT & PLAN NOTE
-BP elevated at started visit 170/100   -Recheck at end of visit by me 160/78   -She does relay a history of elevated blood pressure at office visits and daughter takes blood pressure at home with better readings  -Recheck your blood pressure at home   If BP >150/>100 notify your PCP for a blood pressure check

## 2022-08-01 ENCOUNTER — OFFICE VISIT (OUTPATIENT)
Dept: VASCULAR SURGERY | Facility: CLINIC | Age: 62
End: 2022-08-01

## 2022-08-01 VITALS
WEIGHT: 220.2 LBS | HEIGHT: 63 IN | OXYGEN SATURATION: 96 % | SYSTOLIC BLOOD PRESSURE: 142 MMHG | HEART RATE: 80 BPM | BODY MASS INDEX: 39.02 KG/M2 | DIASTOLIC BLOOD PRESSURE: 78 MMHG

## 2022-08-01 DIAGNOSIS — I87.311 CHRONIC VENOUS HYPERTENSION (IDIOPATHIC) WITH ULCER OF RIGHT LOWER EXTREMITY (HCC): Primary | ICD-10-CM

## 2022-08-01 DIAGNOSIS — L97.919 CHRONIC VENOUS HYPERTENSION (IDIOPATHIC) WITH ULCER OF RIGHT LOWER EXTREMITY (HCC): Primary | ICD-10-CM

## 2022-08-01 PROCEDURE — 99024 POSTOP FOLLOW-UP VISIT: CPT | Performed by: SURGERY

## 2022-08-01 NOTE — PROGRESS NOTES
Assessment/Plan:  Doing well status post laser ablation right lower extremity with multiple stab phlebectomies  Her venous ulcer has healed nicely as well as an area of concern in the right heel, she is wearing her support stockings religiously and also feels that a light Tubigrip at nighttime also decreases her discomfort  Plan:  Follow-up in the office on an as-needed basis  I did ask her to change her support hose at least every 6-8 months as they lose their therapeutic effect by that time  Diagnoses and all orders for this visit:    Chronic venous hypertension (idiopathic) with ulcer of right lower extremity ([address] )  -     Jobst Stockings        Subjective:      Patient ID: Chapis Hammond is a 64 y o  female  Patient is here today 6 weeks s/p right EVLT with stab phlebectomies done 6/24/2022  Patient denies any pain  She c/o her skin on the shin and on the back of her calf feels "hard"  The venous ulcer on her right heel is now well healed  She has been wearing her compression stockings daily and she also wear tubi- at night to sleep  Pt is a non-smoker  HPI    The following portions of the patient's history were reviewed and updated as appropriate: allergies, current medications, past family history, past medical history, past social history, past surgical history and problem list     Review of Systems   Constitutional: Negative  HENT: Negative  Eyes: Negative  Respiratory: Negative  Cardiovascular: Negative  Gastrointestinal: Negative  Endocrine: Negative  Genitourinary: Negative  Musculoskeletal: Positive for arthralgias  Skin: Negative  Allergic/Immunologic: Negative  Neurological: Negative  Hematological: Negative  Psychiatric/Behavioral: Negative  Objective: There were no vitals taken for this visit           Physical Exam  ulcer completely healed shin as well as heel area, stab phlebectomy incisions are well healed there is minimal swelling in her right lower extremity  I have reviewed and made appropriate changes to the review of systems input by the medical assistant  Vitals:    08/01/22 1502   BP: 142/78   BP Location: Left arm   Patient Position: Sitting   Cuff Size: Standard   Pulse: 80   SpO2: 96%   Weight: 99 9 kg (220 lb 3 2 oz)   Height: 5' 3" (1 6 m)       Patient Active Problem List   Diagnosis    Cellulitis of right anterior lower leg    Primary hypertension    Venous ulcer of right lower extremity with varicose veins (HCC)    Chronic venous hypertension (idiopathic) with ulcer of right lower extremity (HCC)    COVID-19    Elevated liver enzymes    Hypokalemia    Sepsis (HCC)    Diarrhea    Thrombophlebitis       Past Surgical History:   Procedure Laterality Date    FRACTURE SURGERY Right 1984    with hardware    MS ENDOVENOUS LASER, 1ST VEIN Right 6/24/2022    Procedure: ENDOVASCULAR LASER THERAPY (EVLT) right leg with multiple stab phlebectomies;  Surgeon: Brant Meza MD;  Location:  MAIN OR;  Service: Vascular    MS PHLEB VEINS - EXTREM 20+ Right 6/24/2022    Procedure: MULTIPLE STAB PHLEBECTOMIES, 10 stabs;   Surgeon: Brant Meza MD;  Location:  MAIN OR;  Service: Vascular       Family History   Problem Relation Age of Onset    No Known Problems Mother     No Known Problems Father     No Known Problems Sister     No Known Problems Brother     No Known Problems Daughter     No Known Problems Son     No Known Problems Maternal Aunt     No Known Problems Maternal Uncle     No Known Problems Paternal Aunt     No Known Problems Paternal Uncle     No Known Problems Maternal Grandmother     No Known Problems Maternal Grandfather     No Known Problems Paternal Grandmother     No Known Problems Paternal Grandfather     No Known Problems Cousin        Social History     Socioeconomic History    Marital status: /Civil Union     Spouse name: Not on file    Number of children: Not on file    Years of education: Not on file    Highest education level: Not on file   Occupational History    Not on file   Tobacco Use    Smoking status: Never Smoker    Smokeless tobacco: Never Used   Vaping Use    Vaping Use: Never used   Substance and Sexual Activity    Alcohol use: Not Currently    Drug use: Never    Sexual activity: Not Currently   Other Topics Concern    Not on file   Social History Narrative    Not on file     Social Determinants of Health     Financial Resource Strain: Not on file   Food Insecurity: No Food Insecurity    Worried About Running Out of Food in the Last Year: Never true    Bryce of Food in the Last Year: Never true   Transportation Needs: No Transportation Needs    Lack of Transportation (Medical): No    Lack of Transportation (Non-Medical):  No   Physical Activity: Not on file   Stress: Not on file   Social Connections: Not on file   Intimate Partner Violence: Not on file   Housing Stability: Low Risk     Unable to Pay for Housing in the Last Year: No    Number of Places Lived in the Last Year: 1    Unstable Housing in the Last Year: No       Allergies   Allergen Reactions    Wound Dressing Adhesive Rash     bandaid adhesives         Current Outpatient Medications:     Acetaminophen 325 MG CAPS, Take 3 capsules (975 mg total) by mouth every 8 (eight) hours as needed (mild pain), Disp: 30 capsule, Rfl: 0    betamethasone dipropionate (DIPROSONE) 0 05 % cream, Apply topically as needed (for redness) (Patient not taking: Reported on 8/1/2022), Disp: 30 g, Rfl: 0    traMADol (ULTRAM) 50 mg tablet, Take 1 tablet (50 mg total) by mouth every 6 (six) hours as needed for moderate pain (Patient not taking: Reported on 8/1/2022), Disp: 30 tablet, Rfl: 0

## 2022-08-01 NOTE — ASSESSMENT & PLAN NOTE
Doing well status post laser ablation right lower extremity with multiple stab phlebectomies  Her venous ulcer has healed nicely as well as an area of concern in the right heel, she is wearing her support stockings religiously and also feels that a light Tubigrip at nighttime also decreases her discomfort  Plan:  Follow-up in the office on an as-needed basis  I did ask her to change her support hose at least every 6-8 months as they lose their therapeutic effect by that time

## 2022-08-01 NOTE — LETTER
August 1, 2022     Tarsha Perea MD  [facility]  [address] [facility]    Patient: Matt Negrete   YOB: 1960   Date of Visit: 8/1/2022       Dear Dr Sofía Alba: Thank you for referring Matt Negrete to me for evaluation  Below are my notes for this consultation  If you have questions, please do not hesitate to call me  I look forward to following your patient along with you           Sincerely,        Rolly Espinoza MD        CC: No Recipients

## 2022-10-12 PROBLEM — A41.9 SEPSIS (HCC): Status: RESOLVED | Noted: 2022-01-11 | Resolved: 2022-10-12

## 2024-02-04 ENCOUNTER — OFFICE VISIT (OUTPATIENT)
Dept: URGENT CARE | Facility: CLINIC | Age: 64
End: 2024-02-04
Payer: COMMERCIAL

## 2024-02-04 VITALS
RESPIRATION RATE: 18 BRPM | WEIGHT: 210 LBS | HEART RATE: 67 BPM | SYSTOLIC BLOOD PRESSURE: 156 MMHG | DIASTOLIC BLOOD PRESSURE: 78 MMHG | OXYGEN SATURATION: 97 % | TEMPERATURE: 98 F | BODY MASS INDEX: 37.2 KG/M2

## 2024-02-04 DIAGNOSIS — R39.9 UTI SYMPTOMS: Primary | ICD-10-CM

## 2024-02-04 LAB
SL AMB  POCT GLUCOSE, UA: ABNORMAL
SL AMB LEUKOCYTE ESTERASE,UA: ABNORMAL
SL AMB POCT BILIRUBIN,UA: ABNORMAL
SL AMB POCT BLOOD,UA: ABNORMAL
SL AMB POCT CLARITY,UA: ABNORMAL
SL AMB POCT COLOR,UA: YELLOW
SL AMB POCT KETONES,UA: ABNORMAL
SL AMB POCT NITRITE,UA: POSITIVE
SL AMB POCT PH,UA: 6
SL AMB POCT SPECIFIC GRAVITY,UA: 1.02
SL AMB POCT URINE PROTEIN: 30
SL AMB POCT UROBILINOGEN: ABNORMAL

## 2024-02-04 PROCEDURE — 87086 URINE CULTURE/COLONY COUNT: CPT

## 2024-02-04 PROCEDURE — 81002 URINALYSIS NONAUTO W/O SCOPE: CPT

## 2024-02-04 PROCEDURE — 87186 SC STD MICRODIL/AGAR DIL: CPT

## 2024-02-04 PROCEDURE — G0383 LEV 4 HOSP TYPE B ED VISIT: HCPCS

## 2024-02-04 PROCEDURE — 87077 CULTURE AEROBIC IDENTIFY: CPT

## 2024-02-04 RX ORDER — SULFAMETHOXAZOLE AND TRIMETHOPRIM 800; 160 MG/1; MG/1
1 TABLET ORAL EVERY 12 HOURS SCHEDULED
Qty: 14 TABLET | Refills: 0 | Status: SHIPPED | OUTPATIENT
Start: 2024-02-04 | End: 2024-02-11

## 2024-02-04 NOTE — PATIENT INSTRUCTIONS
Your urine dip was positive for leukocytes protein ketones and blood.    You have a urinary tract infection  Increase your fluids  Rest  Take the antibiotic as prescribed  I am giving you a list of the surrounding physicians in the area.   You need to pick a provider to follow-up with and for your care.  If you develop any fever chills back pain or abdominal pain you need to go to the emergency room

## 2024-02-04 NOTE — PROGRESS NOTES
[facility]        NAME: Jessi Vann is a 63 y.o. female  : 1960    MRN: [id number]  DATE: 2024  TIME: 1:58 PM    Assessment and Plan   UTI symptoms [R39.9]  1. UTI symptoms  POCT urine dip    Urine culture    sulfamethoxazole-trimethoprim (BACTRIM DS) 800-160 mg per tablet            Patient Instructions   Your urine dip was positive for leukocytes protein ketones and blood.    You have a urinary tract infection  Increase your fluids  Rest  Take the antibiotic as prescribed  I am giving you a list of the surrounding physicians in the area.   You need to pick a provider to follow-up with and for your care.  If you develop any fever chills back pain or abdominal pain you need to go to the emergency room    Follow up with PCP in 3-5 days.  Proceed to  ER if symptoms worsen.    Chief Complaint     Chief Complaint   Patient presents with    Possible UTI     Patient reports starting 4-5 days ago she starting with increased frequency, urgency, pain upon urination.         History of Present Illness       This is a 63-year-old female who presents today with dysuria, frequency, and urgency since Wednesday or Thursday.  She denies any fever or chills or fatigue.  She denies any belly pain or lower back pain.  She states she has has not been to the doctor in a while and has no PCP.  The patient is here with her daughter        Review of Systems   Review of Systems   Constitutional:  Positive for chills. Negative for fatigue and fever.   HENT: Negative.     Eyes: Negative.    Respiratory: Negative.  Negative for cough and shortness of breath.    Cardiovascular:  Negative for chest pain.   Gastrointestinal:  Negative for abdominal pain, diarrhea, nausea and vomiting.   Genitourinary:  Positive for dysuria, frequency and urgency. Negative for decreased urine volume and vaginal bleeding.   Musculoskeletal:  Negative for myalgias.   Neurological:  Negative for dizziness and headaches.         Current  Medications       Current Outpatient Medications:     sulfamethoxazole-trimethoprim (BACTRIM DS) 800-160 mg per tablet, Take 1 tablet by mouth every 12 (twelve) hours for 7 days, Disp: 14 tablet, Rfl: 0    Acetaminophen 325 MG CAPS, Take 3 capsules (975 mg total) by mouth every 8 (eight) hours as needed (mild pain), Disp: 30 capsule, Rfl: 0    betamethasone dipropionate (DIPROSONE) 0.05 % cream, Apply topically as needed (for redness) (Patient not taking: Reported on 8/1/2022), Disp: 30 g, Rfl: 0    traMADol (ULTRAM) 50 mg tablet, Take 1 tablet (50 mg total) by mouth every 6 (six) hours as needed for moderate pain (Patient not taking: Reported on 8/1/2022), Disp: 30 tablet, Rfl: 0    Current Allergies     Allergies as of 02/04/2024 - Reviewed 02/04/2024   Allergen Reaction Noted    Wound dressing adhesive Rash 12/15/2021            The following portions of the patient's history were reviewed and updated as appropriate: allergies, current medications, past family history, past medical history, past social history, past surgical history and problem list.     Past Medical History:   Diagnosis Date    Blood clot in vein 1999    RLE, post partum    COVID-19 12/2021    Hypertension     Ulcer of right shin (HCC)        Past Surgical History:   Procedure Laterality Date    FRACTURE SURGERY Right 1984    with hardware    CO ENDOVEN ABLTJ INCMPTNT VEIN XTR LASER 1ST VEIN Right 6/24/2022    Procedure: ENDOVASCULAR LASER THERAPY (EVLT) right leg with multiple stab phlebectomies;  Surgeon: Sohail Baer MD;  Location:  MAIN OR;  Service: Vascular    CO STAB PHLEBT VARICOSE VEINS 1 XTR > 20 INCS Right 6/24/2022    Procedure: MULTIPLE STAB PHLEBECTOMIES, 10 stabs;  Surgeon: Sohail Baer MD;  Location:  MAIN OR;  Service: Vascular       Family History   Problem Relation Age of Onset    No Known Problems Mother     No Known Problems Father     No Known Problems Sister     No Known Problems Brother     No Known Problems  Daughter     No Known Problems Son     No Known Problems Maternal Aunt     No Known Problems Maternal Uncle     No Known Problems Paternal Aunt     No Known Problems Paternal Uncle     No Known Problems Maternal Grandmother     No Known Problems Maternal Grandfather     No Known Problems Paternal Grandmother     No Known Problems Paternal Grandfather     No Known Problems Cousin          Medications have been verified.        Objective   /78   Pulse 67   Temp 98 °F (36.7 °C) (Tympanic)   Resp 18   Wt 95.3 kg (210 lb)   SpO2 97%   BMI 37.20 kg/m²   No LMP recorded. Patient is postmenopausal.       Physical Exam     Physical Exam  Vitals reviewed.   Constitutional:       General: She is not in acute distress.     Appearance: Normal appearance.   HENT:      Head: Normocephalic and atraumatic.      Right Ear: Tympanic membrane and ear canal normal.      Left Ear: Tympanic membrane and ear canal normal.      Mouth/Throat:      Mouth: Mucous membranes are moist.      Pharynx: No oropharyngeal exudate or posterior oropharyngeal erythema.   Eyes:      Extraocular Movements: Extraocular movements intact.      Conjunctiva/sclera: Conjunctivae normal.      Pupils: Pupils are equal, round, and reactive to light.   Cardiovascular:      Rate and Rhythm: Normal rate and regular rhythm.      Pulses: Normal pulses.      Heart sounds: Normal heart sounds. No murmur heard.  Pulmonary:      Effort: Pulmonary effort is normal. No respiratory distress.      Breath sounds: Normal breath sounds.   Abdominal:      General: Abdomen is flat. Bowel sounds are normal. There is no distension.      Palpations: Abdomen is soft.      Tenderness: There is no abdominal tenderness. There is no guarding or rebound.   Musculoskeletal:         General: No swelling or tenderness. Normal range of motion.      Cervical back: Normal range of motion and neck supple. No tenderness.   Skin:     General: Skin is warm.      Capillary Refill: Capillary  refill takes less than 2 seconds.   Neurological:      General: No focal deficit present.      Mental Status: She is alert and oriented to person, place, and time.   Psychiatric:         Mood and Affect: Mood normal.         Behavior: Behavior normal.         Judgment: Judgment normal.

## 2024-02-06 LAB
BACTERIA UR CULT: ABNORMAL

## 2024-03-26 ENCOUNTER — OFFICE VISIT (OUTPATIENT)
Dept: WOUND CARE | Facility: HOSPITAL | Age: 64
End: 2024-03-26
Payer: COMMERCIAL

## 2024-03-26 VITALS
TEMPERATURE: 98 F | BODY MASS INDEX: 38.64 KG/M2 | HEIGHT: 62 IN | DIASTOLIC BLOOD PRESSURE: 88 MMHG | WEIGHT: 210 LBS | HEART RATE: 80 BPM | SYSTOLIC BLOOD PRESSURE: 194 MMHG

## 2024-03-26 DIAGNOSIS — I87.311 CHRONIC VENOUS HYPERTENSION (IDIOPATHIC) WITH ULCER OF RIGHT LOWER EXTREMITY (HCC): Primary | ICD-10-CM

## 2024-03-26 DIAGNOSIS — L97.919 CHRONIC VENOUS HYPERTENSION (IDIOPATHIC) WITH ULCER OF RIGHT LOWER EXTREMITY (HCC): Primary | ICD-10-CM

## 2024-03-26 PROCEDURE — 99204 OFFICE O/P NEW MOD 45 MIN: CPT | Performed by: PODIATRIST

## 2024-03-26 PROCEDURE — 99213 OFFICE O/P EST LOW 20 MIN: CPT | Performed by: PODIATRIST

## 2024-03-26 PROCEDURE — 11042 DBRDMT SUBQ TIS 1ST 20SQCM/<: CPT | Performed by: PODIATRIST

## 2024-03-26 RX ORDER — LIDOCAINE 40 MG/G
CREAM TOPICAL ONCE
Status: COMPLETED | OUTPATIENT
Start: 2024-03-26 | End: 2024-03-26

## 2024-03-26 RX ADMIN — LIDOCAINE: 40 CREAM TOPICAL at 11:49

## 2024-03-26 NOTE — PROGRESS NOTES
Patient ID: Jessi Vann is a 63 y.o. female Date of Birth 1960       Chief Complaint   Patient presents with    New Patient Visit     Venous ulcer RLE       Allergies:  Wound dressing adhesive    Diagnosis:  1. Chronic venous hypertension (idiopathic) with ulcer of right lower extremity (HCC)  -     lidocaine (LMX) 4 % cream  -     Wound cleansing and dressings Venous Ulcer Left;Medial Leg; Future  -     Wound compression and edema control Venous Ulcer Left;Medial Leg; Future  -     Wound miscellaneous orders Venous Ulcer Left;Medial Leg; Future  -     VAS Lower extremity venous insufficiency duplex, bilateral w/ measurements; Future; Expected date: 03/26/2024       Diagnosis ICD-10-CM Associated Orders   1. Chronic venous hypertension (idiopathic) with ulcer of right lower extremity (HCC)  I87.311 lidocaine (LMX) 4 % cream    L97.919 Wound cleansing and dressings Venous Ulcer Left;Medial Leg     Wound compression and edema control Venous Ulcer Left;Medial Leg     Wound miscellaneous orders Venous Ulcer Left;Medial Leg     VAS Lower extremity venous insufficiency duplex, bilateral w/ measurements           Assessment & Plan:  Venous stasis ulceration anterior left lower leg, wound was debrided with subcuticular tissues and dressed with Adaptic dry dressing and Coflex lite multilayer wrap which she is to leave dry clean and intact for 1 week, she may use a cast cover to shower, do not get dressing wet.  I reminded patient if she notices her dressing feeling like it is getting tight, she needs to sit down and elevate her leg, noticed on the dressing getting tight.  I reviewed patient's medical records, vascular surgery notes, lower extremity venous Dopplers, EVLT report, no studies have been performed on her right lower leg, I ordered bilateral lower extremity venous Dopplers with reflux, patient already has an appointment with vascular surgery on April 9, I have asked daughter to try and schedule the Dopplers  "prior to that appointment.  Frequent elevation, low-sodium diet, proper protein intake was reviewed with patient and daughter, they understand and agree with the plan and will follow-up in 1 week.    Debridement   Wound 03/26/24 Venous Ulcer Leg Left;Medial    Universal Protocol:  Consent: Verbal consent obtained.  Risks and benefits: risks, benefits and alternatives were discussed  Consent given by: patient  Time out: Immediately prior to procedure a \"time out\" was called to verify the correct patient, procedure, equipment, support staff and site/side marked as required.  Patient understanding: patient states understanding of the procedure being performed  Patient identity confirmed: verbally with patient    Debridement Details  Performed by: physician  Debridement type: surgical  Level of debridement: subcutaneous tissue  Pain control: lidocaine 4%      Post-debridement measurements  Length (cm): 0.7  Width (cm): 0.7  Depth (cm): 0.3  Percent debrided: 100%  Surface Area (cm^2): 0.49  Area Debrided (cm^2): 0.49  Volume (cm^3): 0.15    Tissue and other material debrided: subcutaneous tissue  Devitalized tissue debrided: biofilm, fibrin, necrotic debris and slough  Instrument(s) utilized: blade and forceps  Bleeding: small  Hemostasis obtained with: pressure  Procedural pain (0-10): insensate  Post-procedural pain: insensate   Response to treatment: procedure was tolerated well               Subjective:   Jessi presents today  with her daughter for care of new onset left lower leg venous stasis ulceration which started what she thinks is a scratch 2 to 3 weeks ago.  She states she has 20 to 30 mm compression stockings which she wears, her current ones are a few weeks old and no more than 2 to 3 months old.  She has history of venous disease and has had EVLT on the right leg where she prior had an ulceration.  She does not complain of any nausea, vomiting, fever, chills.          The following portions of the " patient's history were reviewed and updated as appropriate:   Patient Active Problem List   Diagnosis    Cellulitis of right anterior lower leg    Primary hypertension    Venous ulcer of right lower extremity with varicose veins (HCC)    Chronic venous hypertension (idiopathic) with ulcer of right lower extremity (HCC)    COVID-19    Elevated liver enzymes    Hypokalemia    Diarrhea    Thrombophlebitis    UTI symptoms     Past Medical History:   Diagnosis Date    Blood clot in vein 1999    RLE, post partum    COVID-19 12/2021    Hypertension     Ulcer of right shin (HCC)      Past Surgical History:   Procedure Laterality Date    FRACTURE SURGERY Right 1984    with hardware    CO ENDOVEN ABLTJ INCMPTNT VEIN XTR LASER 1ST VEIN Right 6/24/2022    Procedure: ENDOVASCULAR LASER THERAPY (EVLT) right leg with multiple stab phlebectomies;  Surgeon: Sohail Baer MD;  Location:  MAIN OR;  Service: Vascular    CO STAB PHLEBT VARICOSE VEINS 1 XTR > 20 INCS Right 6/24/2022    Procedure: MULTIPLE STAB PHLEBECTOMIES, 10 stabs;  Surgeon: Sohail Baer MD;  Location:  MAIN OR;  Service: Vascular     Social History     Socioeconomic History    Marital status: /Civil Union     Spouse name: Not on file    Number of children: Not on file    Years of education: Not on file    Highest education level: Not on file   Occupational History    Not on file   Tobacco Use    Smoking status: Never    Smokeless tobacco: Never   Vaping Use    Vaping status: Never Used   Substance and Sexual Activity    Alcohol use: Not Currently    Drug use: Never    Sexual activity: Not Currently   Other Topics Concern    Not on file   Social History Narrative    Not on file     Social Determinants of Health     Financial Resource Strain: Not on file   Food Insecurity: No Food Insecurity (1/12/2022)    Hunger Vital Sign     Worried About Running Out of Food in the Last Year: Never true     Ran Out of Food in the Last Year: Never true    Transportation Needs: Unknown (1/12/2022)    PRAPARE - Transportation     Lack of Transportation (Medical): No     Lack of Transportation (Non-Medical): Not on file   Physical Activity: Not on file   Stress: Not on file   Social Connections: Not on file   Intimate Partner Violence: Not on file   Housing Stability: Unknown (1/12/2022)    Housing Stability Vital Sign     Unable to Pay for Housing in the Last Year: No     Number of Places Lived in the Last Year: Not on file     Unstable Housing in the Last Year: No        Current Outpatient Medications:     Acetaminophen 325 MG CAPS, Take 3 capsules (975 mg total) by mouth every 8 (eight) hours as needed (mild pain), Disp: 30 capsule, Rfl: 0    betamethasone dipropionate (DIPROSONE) 0.05 % cream, Apply topically as needed (for redness) (Patient not taking: Reported on 8/1/2022), Disp: 30 g, Rfl: 0    traMADol (ULTRAM) 50 mg tablet, Take 1 tablet (50 mg total) by mouth every 6 (six) hours as needed for moderate pain (Patient not taking: Reported on 8/1/2022), Disp: 30 tablet, Rfl: 0  No current facility-administered medications for this visit.  Family History   Problem Relation Age of Onset    No Known Problems Mother     No Known Problems Father     No Known Problems Sister     No Known Problems Brother     No Known Problems Daughter     No Known Problems Son     No Known Problems Maternal Aunt     No Known Problems Maternal Uncle     No Known Problems Paternal Aunt     No Known Problems Paternal Uncle     No Known Problems Maternal Grandmother     No Known Problems Maternal Grandfather     No Known Problems Paternal Grandmother     No Known Problems Paternal Grandfather     No Known Problems Cousin        Review of Systems   Constitutional:  Negative for chills and fever.   HENT:  Negative for ear pain and sore throat.    Eyes:  Negative for pain and visual disturbance.   Respiratory:  Negative for cough and shortness of breath.    Cardiovascular:  Negative for  "chest pain and palpitations.   Gastrointestinal:  Negative for abdominal pain and vomiting.   Genitourinary:  Negative for dysuria and hematuria.   Musculoskeletal:  Positive for gait problem. Negative for arthralgias and back pain.   Skin:  Positive for color change and wound. Negative for rash.   Neurological:  Positive for numbness. Negative for seizures and syncope.   Psychiatric/Behavioral: Negative.     All other systems reviewed and are negative.        Objective:  BP (!) 194/88   Pulse 80   Temp 98 °F (36.7 °C)   Ht 5' 2\" (1.575 m)   Wt 95.3 kg (210 lb)   BMI 38.41 kg/m²     Physical Exam  Constitutional:       Appearance: Normal appearance. She is well-developed. She is obese.   HENT:      Head: Normocephalic and atraumatic.      Nose: Nose normal.      Mouth/Throat:      Mouth: Mucous membranes are moist.      Pharynx: Oropharynx is clear.   Eyes:      Conjunctiva/sclera: Conjunctivae normal.      Pupils: Pupils are equal, round, and reactive to light.   Cardiovascular:      Pulses:           Dorsalis pedis pulses are 2+ on the right side and 2+ on the left side.        Posterior tibial pulses are 2+ on the right side and 2+ on the left side.   Pulmonary:      Effort: Pulmonary effort is normal.   Musculoskeletal:         General: Normal range of motion.      Cervical back: Normal range of motion.      Right lower le+ Pitting Edema present.      Left lower le+ Pitting Edema present.   Skin:     General: Skin is warm and dry.      Capillary Refill: Capillary refill takes less than 2 seconds.   Neurological:      General: No focal deficit present.      Mental Status: She is alert and oriented to person, place, and time. Mental status is at baseline.   Psychiatric:         Mood and Affect: Mood normal.         Behavior: Behavior normal.         Thought Content: Thought content normal.         Judgment: Judgment normal.           Wound 24 Venous Ulcer Leg Left;Medial (Active)   Wound Image   " 03/26/24 1208   Wound Description Yellow;Black;Pink 03/26/24 1141   Angelika-wound Assessment Edema;Erythema 03/26/24 1141   Wound Length (cm) 0.6 cm 03/26/24 1141   Wound Width (cm) 0.5 cm 03/26/24 1141   Wound Depth (cm) 0.1 cm 03/26/24 1141   Wound Surface Area (cm^2) 0.3 cm^2 03/26/24 1141   Wound Volume (cm^3) 0.03 cm^3 03/26/24 1141   Calculated Wound Volume (cm^3) 0.03 cm^3 03/26/24 1141   Drainage Amount Scant 03/26/24 1141   Drainage Description Serous 03/26/24 1141   Non-staged Wound Description Full thickness 03/26/24 1141   Dressing Status Intact 03/26/24 1141                No results found.    Wound Instructions:  Orders Placed This Encounter   Procedures    Wound cleansing and dressings Venous Ulcer Left;Medial Leg     Wash your hands with soap and water.  Remove old dressing, discard into plastic bag and place in trash.  Cleanse the wound with soap and water prior to applying a clean dressing. Do not use tissue or cotton balls. Do not scrub the wound. Pat dry using gauze.  Shower yes (with cast cover) or sponge bathe  Apply amild moisturizer to skin surrounding wound  Apply adaptic to the left leg wound.  Cover with coflex lite  Secure with coflex lite  Change dressing weekly at [facility].     Standing Status:   Future     Standing Expiration Date:   3/26/2025    Wound compression and edema control Venous Ulcer Left;Medial Leg     Unna Boot/ Multi-layer compression wrap Instructions: Coflex Lite    Keep compression wrap/wraps clean and dry. If wraps are too tight and you experience numbness/tingling, call the wound center. If after hours, remove wraps or proceed to nearest E.R. and call wound center in AM.    Wrap will be changed 1 x weekly    Avoid prolonged standing in one place.    Elevate leg(s) above the level of the heart when sitting or as much as possible.     Standing Status:   Future     Standing Expiration Date:   3/26/2025    Wound miscellaneous orders Venous Ulcer Left;Medial Leg     Vascular  "studies being ordered for your lower extremitites.     Standing Status:   Future     Standing Expiration Date:   3/26/2025         Freida Carlin, KELVIN, KELVIN, FACDIANNA    Portions of the record may have been created with voice recognition software. Occasional wrong word or \"sound a like\" substitutions may have occurred due to the inherent limitations of voice recognition software. Read the chart carefully and recognize, using context, where substitutions have occurred.    "

## 2024-03-26 NOTE — PATIENT INSTRUCTIONS
Orders Placed This Encounter   Procedures    Wound cleansing and dressings Venous Ulcer Left;Medial Leg     Wash your hands with soap and water.  Remove old dressing, discard into plastic bag and place in trash.  Cleanse the wound with soap and water prior to applying a clean dressing. Do not use tissue or cotton balls. Do not scrub the wound. Pat dry using gauze.  Shower yes (with cast cover) or sponge bathe  Apply amild moisturizer to skin surrounding wound  Apply adaptic to the left leg wound.  Cover with coflex lite  Secure with coflex lite  Change dressing weekly at [facility].     Standing Status:   Future     Standing Expiration Date:   3/26/2025    Wound compression and edema control Venous Ulcer Left;Medial Leg     Unna Boot/ Multi-layer compression wrap Instructions: Coflex Lite    Keep compression wrap/wraps clean and dry. If wraps are too tight and you experience numbness/tingling, call the wound center. If after hours, remove wraps or proceed to nearest E.R. and call wound center in AM.    Wrap will be changed 1 x weekly    Avoid prolonged standing in one place.    Elevate leg(s) above the level of the heart when sitting or as much as possible.     Standing Status:   Future     Standing Expiration Date:   3/26/2025    Wound miscellaneous orders Venous Ulcer Left;Medial Leg     Vascular studies being ordered for your lower extremitites.     Standing Status:   Future     Standing Expiration Date:   3/26/2025

## 2024-04-02 ENCOUNTER — OFFICE VISIT (OUTPATIENT)
Dept: WOUND CARE | Facility: HOSPITAL | Age: 64
End: 2024-04-02
Payer: COMMERCIAL

## 2024-04-02 VITALS
HEART RATE: 60 BPM | TEMPERATURE: 96.9 F | DIASTOLIC BLOOD PRESSURE: 97 MMHG | SYSTOLIC BLOOD PRESSURE: 171 MMHG | RESPIRATION RATE: 16 BRPM

## 2024-04-02 DIAGNOSIS — I87.312 IDIOPATHIC CHRONIC VENOUS HYPERTENSION OF LEFT LOWER EXTREMITY WITH ULCER (HCC): Primary | ICD-10-CM

## 2024-04-02 DIAGNOSIS — L97.922 NON-PRESSURE CHRONIC ULCER OF LEFT LOWER LEG WITH FAT LAYER EXPOSED (HCC): ICD-10-CM

## 2024-04-02 DIAGNOSIS — L97.929 IDIOPATHIC CHRONIC VENOUS HYPERTENSION OF LEFT LOWER EXTREMITY WITH ULCER (HCC): Primary | ICD-10-CM

## 2024-04-02 PROCEDURE — 11042 DBRDMT SUBQ TIS 1ST 20SQCM/<: CPT | Performed by: PODIATRIST

## 2024-04-02 RX ORDER — LIDOCAINE 40 MG/G
CREAM TOPICAL ONCE
Status: COMPLETED | OUTPATIENT
Start: 2024-04-02 | End: 2024-04-02

## 2024-04-02 RX ADMIN — LIDOCAINE: 40 CREAM TOPICAL at 09:25

## 2024-04-02 NOTE — PATIENT INSTRUCTIONS
Orders Placed This Encounter   Procedures    Wound cleansing and dressings Venous Ulcer Left;Medial Leg     Left leg wound:  Shower yes (with cast cover) or sponge bathe    Apply a mild moisturizer to skin surrounding wound  Apply xeroform to the left leg wound. Cover with gauze  Secure with coflex tlc   Change dressing weekly at [facility].              · Wound compression and edema control Venous Ulcer Left;Medial Leg   Multi-layer compression wrap Instructions: Coflex      Keep compression wrap/wraps clean and dry. If wraps are too tight and you experience numbness/tingling, call the wound center. If after hours, remove wraps or proceed to nearest E.R. and call wound center in AM.     Wrap will be changed 1 x weekly     Avoid prolonged standing in one place.     Elevate leg(s) above the level of the heart when sitting or as much as possible     Standing Status:   Future     Standing Expiration Date:   4/2/2025

## 2024-04-02 NOTE — PROGRESS NOTES
Patient ID: Jessi Vann is a 63 y.o. female Date of Birth 1960       Chief Complaint   Patient presents with    Follow Up Wound Care Visit     Left leg wound        Allergies:  Wound dressing adhesive    Diagnosis:  1. Chronic venous hypertension (idiopathic) with ulcer of right lower extremity (HCC)  -     lidocaine (LMX) 4 % cream  -     Wound cleansing and dressings Venous Ulcer Left;Medial Leg; Future    2. Non-pressure chronic ulcer of right lower leg with fat layer exposed (HCC)  -     lidocaine (LMX) 4 % cream  -     Wound cleansing and dressings Venous Ulcer Left;Medial Leg; Future       Diagnosis ICD-10-CM Associated Orders   1. Chronic venous hypertension (idiopathic) with ulcer of right lower extremity (HCC)  I87.311 lidocaine (LMX) 4 % cream    L97.919 Wound cleansing and dressings Venous Ulcer Left;Medial Leg      2. Non-pressure chronic ulcer of right lower leg with fat layer exposed (HCC)  L97.912 lidocaine (LMX) 4 % cream     Wound cleansing and dressings Venous Ulcer Left;Medial Leg           Assessment & Plan:  Chronic venous stasis ulceration left lower extremity, wound was debrided through subcuticular tissues and dressed with Xeroform dry dressing, Coflex multilayer wrap was applied, she is to leave this dressing dry clean and intact for 1 week, she is advised if she feels the dressing is getting tight, it is indeed her leg that is trying to swell and she needs to sit down and elevate.  Patient states she is having pain, I advised her to take Tylenol and ibuprofen alternating, she may take 3 ibuprofen 3 times a day.  Today I reviewed frequent elevation, low-sodium diet, proper protein intake, proper glycemic control, strict pressure and friction reduction, she understands and agrees with the plan and will follow-up in 1 week.    Debridement   Wound 03/26/24 Venous Ulcer Leg Left;Medial    Universal Protocol:  Consent: Verbal consent obtained.  Risks and benefits: risks, benefits and  "alternatives were discussed  Consent given by: patient  Time out: Immediately prior to procedure a \"time out\" was called to verify the correct patient, procedure, equipment, support staff and site/side marked as required.  Patient understanding: patient states understanding of the procedure being performed  Patient identity confirmed: verbally with patient    Debridement Details  Performed by: physician  Debridement type: surgical  Level of debridement: subcutaneous tissue  Pain control: lidocaine 4%      Post-debridement measurements  Length (cm): 0.9  Width (cm): 0.8  Depth (cm): 0.2  Percent debrided: 100%  Surface Area (cm^2): 0.72  Area Debrided (cm^2): 0.72  Volume (cm^3): 0.14    Tissue and other material debrided: subcutaneous tissue  Devitalized tissue debrided: biofilm, callus, fibrin, necrotic debris, slough and eschar  Instrument(s) utilized: blade  Bleeding: small  Hemostasis obtained with: pressure  Procedural pain (0-10): insensate  Post-procedural pain: insensate   Response to treatment: procedure was tolerated well         Subjective:   Jessi presents today for care of left lower extremity venous stasis ulceration, she is tolerating her dressing well, she states it is not as tight as her compression stockings but he feels good.  No new complaints.          The following portions of the patient's history were reviewed and updated as appropriate:   Patient Active Problem List   Diagnosis    Cellulitis of right anterior lower leg    Primary hypertension    Venous ulcer of right lower extremity with varicose veins (HCC)    Chronic venous hypertension (idiopathic) with ulcer of right lower extremity (HCC)    COVID-19    Elevated liver enzymes    Hypokalemia    Diarrhea    Thrombophlebitis    UTI symptoms     Past Medical History:   Diagnosis Date    Blood clot in vein 1999    RLE, post partum    COVID-19 12/2021    Hypertension     Ulcer of right shin (HCC)      Past Surgical History:   Procedure " Laterality Date    FRACTURE SURGERY Right 1984    with hardware    NE ENDOVEN ABLTJ INCMPTNT VEIN XTR LASER 1ST VEIN Right 6/24/2022    Procedure: ENDOVASCULAR LASER THERAPY (EVLT) right leg with multiple stab phlebectomies;  Surgeon: Sohail Baer MD;  Location:  MAIN OR;  Service: Vascular    NE STAB PHLEBT VARICOSE VEINS 1 XTR > 20 INCS Right 6/24/2022    Procedure: MULTIPLE STAB PHLEBECTOMIES, 10 stabs;  Surgeon: Soahil Baer MD;  Location: [facility] MAIN OR;  Service: Vascular     Social History     Socioeconomic History    Marital status: /Civil Union     Spouse name: Not on file    Number of children: Not on file    Years of education: Not on file    Highest education level: Not on file   Occupational History    Not on file   Tobacco Use    Smoking status: Never    Smokeless tobacco: Never   Vaping Use    Vaping status: Never Used   Substance and Sexual Activity    Alcohol use: Not Currently    Drug use: Never    Sexual activity: Not Currently   Other Topics Concern    Not on file   Social History Narrative    Not on file     Social Determinants of Health     Financial Resource Strain: Not on file   Food Insecurity: No Food Insecurity (1/12/2022)    Hunger Vital Sign     Worried About Running Out of Food in the Last Year: Never true     Ran Out of Food in the Last Year: Never true   Transportation Needs: Unknown (1/12/2022)    PRAPARE - Transportation     Lack of Transportation (Medical): No     Lack of Transportation (Non-Medical): Not on file   Physical Activity: Not on file   Stress: Not on file   Social Connections: Not on file   Intimate Partner Violence: Not on file   Housing Stability: Unknown (1/12/2022)    Housing Stability Vital Sign     Unable to Pay for Housing in the Last Year: No     Number of Places Lived in the Last Year: Not on file     Unstable Housing in the Last Year: No        Current Outpatient Medications:     Acetaminophen 325 MG CAPS, Take 3 capsules (975 mg total) by mouth  every 8 (eight) hours as needed (mild pain), Disp: 30 capsule, Rfl: 0    betamethasone dipropionate (DIPROSONE) 0.05 % cream, Apply topically as needed (for redness) (Patient not taking: Reported on 8/1/2022), Disp: 30 g, Rfl: 0    traMADol (ULTRAM) 50 mg tablet, Take 1 tablet (50 mg total) by mouth every 6 (six) hours as needed for moderate pain (Patient not taking: Reported on 8/1/2022), Disp: 30 tablet, Rfl: 0  No current facility-administered medications for this visit.  Family History   Problem Relation Age of Onset    No Known Problems Mother     No Known Problems Father     No Known Problems Sister     No Known Problems Brother     No Known Problems Daughter     No Known Problems Son     No Known Problems Maternal Aunt     No Known Problems Maternal Uncle     No Known Problems Paternal Aunt     No Known Problems Paternal Uncle     No Known Problems Maternal Grandmother     No Known Problems Maternal Grandfather     No Known Problems Paternal Grandmother     No Known Problems Paternal Grandfather     No Known Problems Cousin        Review of Systems   Constitutional:  Negative for chills and fever.   HENT:  Negative for ear pain and sore throat.    Eyes:  Negative for pain and visual disturbance.   Respiratory:  Negative for cough and shortness of breath.    Cardiovascular:  Negative for chest pain and palpitations.   Gastrointestinal:  Negative for abdominal pain and vomiting.   Genitourinary:  Negative for dysuria and hematuria.   Musculoskeletal:  Positive for gait problem. Negative for arthralgias and back pain.   Skin:  Positive for color change and wound. Negative for rash.   Neurological:  Positive for numbness. Negative for seizures and syncope.   Psychiatric/Behavioral: Negative.     All other systems reviewed and are negative.        Objective:  BP (!) 171/97   Pulse 60   Temp (!) 96.9 °F (36.1 °C)   Resp 16     Physical Exam  Constitutional:       Appearance: Normal appearance. She is  well-developed. She is obese.   HENT:      Head: Normocephalic and atraumatic.      Nose: Nose normal.      Mouth/Throat:      Mouth: Mucous membranes are moist.      Pharynx: Oropharynx is clear.   Eyes:      Conjunctiva/sclera: Conjunctivae normal.      Pupils: Pupils are equal, round, and reactive to light.   Cardiovascular:      Pulses:           Dorsalis pedis pulses are 2+ on the right side and 2+ on the left side.        Posterior tibial pulses are 2+ on the right side and 2+ on the left side.   Pulmonary:      Effort: Pulmonary effort is normal.   Musculoskeletal:         General: Normal range of motion.      Cervical back: Normal range of motion.      Right lower le+ Pitting Edema present.      Left lower le+ Pitting Edema present.   Skin:     General: Skin is warm and dry.      Capillary Refill: Capillary refill takes less than 2 seconds.   Neurological:      General: No focal deficit present.      Mental Status: She is alert and oriented to person, place, and time. Mental status is at baseline.   Psychiatric:         Mood and Affect: Mood normal.         Behavior: Behavior normal.         Thought Content: Thought content normal.         Judgment: Judgment normal.           Wound 24 Venous Ulcer Leg Left;Medial (Active)   Wound Image   24 0943   Wound Description Brown;Eschar;Pink 24 09   Angelika-wound Assessment Erythema;Edema 24 0920   Wound Length (cm) 0.8 cm 24 0920   Wound Width (cm) 0.6 cm 24 0920   Wound Depth (cm) 0.1 cm 24 0920   Wound Surface Area (cm^2) 0.48 cm^2 24 0920   Wound Volume (cm^3) 0.048 cm^3 24 0920   Calculated Wound Volume (cm^3) 0.05 cm^3 24 0920   Change in Wound Size % -66.67 24 0920   Drainage Amount Scant 24 0920   Drainage Description Serosanguineous 24 0920   Non-staged Wound Description Full thickness 24 1141   Dressing Status Intact 24 09                No results  "found.    Wound Instructions:  Orders Placed This Encounter   Procedures    Wound cleansing and dressings Venous Ulcer Left;Medial Leg     Left leg wound:  Shower yes (with cast cover) or sponge bathe    Apply a mild moisturizer to skin surrounding wound  Apply xeroform to the left leg wound. Cover with gauze  Secure with coflex tlc   Change dressing weekly at [facility].              · Wound compression and edema control Venous Ulcer Left;Medial Leg   Multi-layer compression wrap Instructions: Coflex      Keep compression wrap/wraps clean and dry. If wraps are too tight and you experience numbness/tingling, call the wound center. If after hours, remove wraps or proceed to nearest E.R. and call wound center in AM.     Wrap will be changed 1 x weekly     Avoid prolonged standing in one place.     Elevate leg(s) above the level of the heart when sitting or as much as possible     Standing Status:   Future     Standing Expiration Date:   4/2/2025         Freida Carlin, KELVIN, DPM, FACFAS    Portions of the record may have been created with voice recognition software. Occasional wrong word or \"sound a like\" substitutions may have occurred due to the inherent limitations of voice recognition software. Read the chart carefully and recognize, using context, where substitutions have occurred.    "

## 2024-04-09 ENCOUNTER — OFFICE VISIT (OUTPATIENT)
Dept: VASCULAR SURGERY | Facility: CLINIC | Age: 64
End: 2024-04-09
Payer: COMMERCIAL

## 2024-04-09 ENCOUNTER — HOSPITAL ENCOUNTER (OUTPATIENT)
Dept: NON INVASIVE DIAGNOSTICS | Facility: HOSPITAL | Age: 64
Discharge: HOME | End: 2024-04-09
Attending: PODIATRIST
Payer: COMMERCIAL

## 2024-04-09 ENCOUNTER — OFFICE VISIT (OUTPATIENT)
Dept: WOUND CARE | Facility: HOSPITAL | Age: 64
End: 2024-04-09
Payer: COMMERCIAL

## 2024-04-09 VITALS
SYSTOLIC BLOOD PRESSURE: 160 MMHG | WEIGHT: 210 LBS | BODY MASS INDEX: 38.64 KG/M2 | DIASTOLIC BLOOD PRESSURE: 92 MMHG | HEIGHT: 62 IN

## 2024-04-09 VITALS
DIASTOLIC BLOOD PRESSURE: 94 MMHG | HEART RATE: 96 BPM | RESPIRATION RATE: 20 BRPM | SYSTOLIC BLOOD PRESSURE: 176 MMHG | TEMPERATURE: 97.5 F

## 2024-04-09 DIAGNOSIS — L97.919 CHRONIC VENOUS HYPERTENSION (IDIOPATHIC) WITH ULCER OF RIGHT LOWER EXTREMITY (HCC): ICD-10-CM

## 2024-04-09 DIAGNOSIS — I87.311 CHRONIC VENOUS HYPERTENSION (IDIOPATHIC) WITH ULCER OF RIGHT LOWER EXTREMITY (HCC): Primary | ICD-10-CM

## 2024-04-09 DIAGNOSIS — L97.929 VENOUS ULCER OF LEFT LEG (HCC): Primary | ICD-10-CM

## 2024-04-09 DIAGNOSIS — L03.115 CELLULITIS OF RIGHT ANTERIOR LOWER LEG: ICD-10-CM

## 2024-04-09 DIAGNOSIS — I83.029 VENOUS ULCER OF LEFT LEG (HCC): Primary | ICD-10-CM

## 2024-04-09 DIAGNOSIS — L97.919 CHRONIC VENOUS HYPERTENSION (IDIOPATHIC) WITH ULCER OF RIGHT LOWER EXTREMITY (HCC): Primary | ICD-10-CM

## 2024-04-09 DIAGNOSIS — I87.311 CHRONIC VENOUS HYPERTENSION (IDIOPATHIC) WITH ULCER OF RIGHT LOWER EXTREMITY (HCC): ICD-10-CM

## 2024-04-09 PROCEDURE — 11042 DBRDMT SUBQ TIS 1ST 20SQCM/<: CPT | Performed by: FAMILY MEDICINE

## 2024-04-09 PROCEDURE — 99214 OFFICE O/P EST MOD 30 MIN: CPT | Performed by: NURSE PRACTITIONER

## 2024-04-09 PROCEDURE — 93970 EXTREMITY STUDY: CPT | Performed by: SURGERY

## 2024-04-09 PROCEDURE — 99214 OFFICE O/P EST MOD 30 MIN: CPT | Performed by: FAMILY MEDICINE

## 2024-04-09 PROCEDURE — 93970 EXTREMITY STUDY: CPT

## 2024-04-09 RX ORDER — FLUOCINOLONE ACETONIDE 0.1 MG/ML
SOLUTION TOPICAL 2 TIMES DAILY
Qty: 60 ML | Refills: 1 | Status: SHIPPED | OUTPATIENT
Start: 2024-04-09

## 2024-04-09 RX ORDER — CEPHALEXIN 500 MG/1
500 CAPSULE ORAL EVERY 6 HOURS SCHEDULED
Qty: 28 CAPSULE | Refills: 0 | Status: SHIPPED | OUTPATIENT
Start: 2024-04-09 | End: 2024-04-16

## 2024-04-09 RX ORDER — LIDOCAINE 40 MG/G
CREAM TOPICAL ONCE
Status: COMPLETED | OUTPATIENT
Start: 2024-04-09 | End: 2024-04-09

## 2024-04-09 RX ADMIN — LIDOCAINE: 40 CREAM TOPICAL at 11:30

## 2024-04-09 NOTE — PROGRESS NOTES
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      Patient presents to the office for evaluation of left distal medial lower leg venous ulceration x3-4 weeks.  She is established at wound care.  She had venous reflux study done today    -LLE - 1 cm x 0.7 cm X 0.2 cm left distal medial lower leg venous stasis ulceration with fibrinous tissue at wound base  -LEVDR showed right deep and superficial venous incompetency and superficial left lower extremity incompetency  -Unna boot dressing replaced left lower extremity  -Advised leg elevation 2 times a day for 30 minutes each  -Moisturize skin daily to maintain skin integrity  -Continue local wound care   -Follow-up in the office with Dr. Baer to recheck left lower extremity    Cellulitis of right anterior lower leg  -Acute onset right distal medial posterior lower leg erythema, warmth and tender to touch  -Will treat with Keflex 500 mg every 6 hours X 7 days  -If inflammation continues for more than 5 days prescribed Synalar ointment  -Will recheck at next office visit       Diagnoses and all orders for this visit:    Venous ulcer of left leg (HCC)    Cellulitis of right anterior lower leg      Subjective:      Patient ID: Jessi Vann is a 63 y.o. female.    Pt presents due to wound on LLE. LEV done 04/09/2024. Pt has JACOB LE varicose veins and bulging veins with swelling, heaviness and fatigue. Pt is a non smoker.     HPI  Patient presents the office referred by wound care.  She has known chronic venous insufficiency with history of right lower extremity venous ablation X2.  She now has a ulceration of the left distal medial lower leg that started on March 17 spontaneously.  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    Review of Systems   Constitutional: Negative.    HENT: Negative.     Eyes: Negative.    Respiratory: Negative.     Cardiovascular:  Positive for leg swelling (JACOB LE).   Gastrointestinal: Negative.    Endocrine: Negative.    Genitourinary: Negative.    Musculoskeletal: Negative.    Skin:  Positive for wound (LLE).   Allergic/Immunologic: Negative.    Neurological: Negative.    Hematological: Negative.    Psychiatric/Behavioral: Negative.           Objective:    I have reviewed and made appropriate changes to the review of systems input by the medical assistant.    Vitals:    04/09/24 1335   BP: 160/92   BP Location: Left arm   Patient Position: Sitting   Cuff Size: Large   Weight: 95.3 kg (210 lb)   Height: 5' 2\" (1.575 m)       Patient Active Problem List   Diagnosis    Cellulitis of right anterior lower leg    Primary hypertension    Venous ulcer of left leg (HCC)    Chronic venous hypertension (idiopathic) with ulcer of right lower extremity (HCC)    COVID-19    Elevated liver enzymes    Hypokalemia    Diarrhea    Thrombophlebitis    UTI symptoms       Past Surgical History:   Procedure Laterality Date    FRACTURE SURGERY Right 1984    with hardware    MO ENDOVEN ABLTJ INCMPTNT VEIN XTR LASER 1ST VEIN Right 6/24/2022    Procedure: ENDOVASCULAR LASER THERAPY (EVLT) right leg with multiple stab phlebectomies;  Surgeon: Sohail Baer MD;  Location:  MAIN OR;  Service: Vascular    MO STAB PHLEBT VARICOSE VEINS 1 XTR > 20 INCS Right 6/24/2022    Procedure: MULTIPLE STAB PHLEBECTOMIES, 10 stabs;  Surgeon: Sohail Baer MD;  [facility]" MAIN OR;  Service: Vascular       Family History   Problem Relation Age of Onset    No Known Problems Mother     No Known Problems Father     No Known Problems Sister     No Known Problems Brother     No Known Problems Daughter     No Known Problems Son     No Known Problems Maternal Aunt     No Known Problems Maternal Uncle     No Known Problems Paternal Aunt     No Known Problems Paternal Uncle     No Known Problems Maternal Grandmother     No Known Problems Maternal Grandfather     No Known Problems Paternal Grandmother     No Known Problems Paternal Grandfather     No Known Problems Cousin        Social History     Socioeconomic History    Marital status: /Civil Union     Spouse name: Not on file    Number of children: Not on file    Years of education: Not on file    Highest education level: Not on file   Occupational History    Not on file   Tobacco Use    Smoking status: Never    Smokeless tobacco: Never   Vaping Use    Vaping status: Never Used   Substance and Sexual Activity    Alcohol use: Not Currently    Drug use: Never    Sexual activity: Not Currently   Other Topics Concern    Not on file   Social History Narrative    Not on file     Social Determinants of Health     Financial Resource Strain: Not on file   Food Insecurity: No Food Insecurity (1/12/2022)    Hunger Vital Sign     Worried About Running Out of Food in the Last Year: Never true     Ran Out of Food in the Last Year: Never true   Transportation Needs: Unknown (1/12/2022)    PRAPARE - Transportation     Lack of Transportation (Medical): No     Lack of Transportation (Non-Medical): Not on file   Physical Activity: Not on file   Stress: Not on file   Social Connections: Not on file   Intimate Partner Violence: Not on file   Housing Stability: Unknown (1/12/2022)    Housing Stability Vital Sign     Unable to Pay for Housing in the Last Year: No     Number of Places Lived in the Last Year: Not on file     Unstable Housing in the Last Year:  "No       Allergies   Allergen Reactions    Wound Dressing Adhesive Rash     bandaid adhesives         Current Outpatient Medications:     Acetaminophen 325 MG CAPS, Take 3 capsules (975 mg total) by mouth every 8 (eight) hours as needed (mild pain), Disp: 30 capsule, Rfl: 0    cephalexin (KEFLEX) 500 mg capsule, Take 1 capsule (500 mg total) by mouth every 6 (six) hours for 7 days, Disp: 28 capsule, Rfl: 0    fluocinolone (SYNALAR) 0.01 % external solution, Apply topically 2 (two) times a day Apply to area of inflammation twice daily as needed for flareups/burning.  Do not use for more than 7 days at a time.  Not for daily use, Disp: 60 mL, Rfl: 1    betamethasone dipropionate (DIPROSONE) 0.05 % cream, Apply topically as needed (for redness) (Patient not taking: Reported on 8/1/2022), Disp: 30 g, Rfl: 0    traMADol (ULTRAM) 50 mg tablet, Take 1 tablet (50 mg total) by mouth every 6 (six) hours as needed for moderate pain (Patient not taking: Reported on 8/1/2022), Disp: 30 tablet, Rfl: 0  No current facility-administered medications for this visit.   /92 (BP Location: Left arm, Patient Position: Sitting, Cuff Size: Large)   Ht 5' 2\" (1.575 m)   Wt 95.3 kg (210 lb)   BMI 38.41 kg/m²          Physical Exam  Vitals and nursing note reviewed. Exam conducted with a chaperone present.   Constitutional:       Appearance: Normal appearance.   HENT:      Head: Normocephalic and atraumatic.   Eyes:      Extraocular Movements: Extraocular movements intact.   Cardiovascular:      Pulses:           Dorsalis pedis pulses are 2+ on the right side and 2+ on the left side.      Heart sounds: Normal heart sounds.   Pulmonary:      Effort: Pulmonary effort is normal.      Breath sounds: Normal breath sounds.   Musculoskeletal:         General: Swelling present. Normal range of motion.      Comments: Right lower extremity truncal varicosities, left anterior thigh truncal varicosities.  Bilateral distal medial lower leg venous " stasis changes and lipodermatosclerosis.  Left distal medial lower leg venous stasis ulceration 1 cm X 0.7 cm X 0.2 cm.  Fibrinous slough at base   Skin:     General: Skin is warm.   Neurological:      General: No focal deficit present.      Mental Status: She is alert and oriented to person, place, and time.

## 2024-04-09 NOTE — ASSESSMENT & PLAN NOTE
-Acute onset right distal medial posterior lower leg erythema, warmth and tender to touch  -Will treat with Keflex 500 mg every 6 hours X 7 days  -If inflammation continues for more than 5 days prescribed Synalar ointment  -Will recheck at next office visit

## 2024-04-09 NOTE — PROGRESS NOTES
"Patient ID: Jessi Vann is a 63 y.o. female Date of Birth 1960     Chief Complaint  Chief Complaint   Patient presents with    Follow Up Wound Care Visit     LLE wound.       Allergies  Wound dressing adhesive    Assessment:    No problem-specific Assessment & Plan notes found for this encounter.       Diagnoses and all orders for this visit:    Chronic venous hypertension (idiopathic) with ulcer of right lower extremity (HCC)  -     Wound Procedure Treatment Venous Ulcer Left;Medial Leg  -     Wound cleansing and dressings Venous Ulcer Left;Medial Leg; Future  -     lidocaine (LMX) 4 % cream    Other orders  -     Debridement Venous Ulcer Left;Medial Leg              Debridement   Wound 03/26/24 Venous Ulcer Leg Left;Medial    Universal Protocol:  Consent: Verbal consent obtained.  Consent given by: patient  Time out: Immediately prior to procedure a \"time out\" was called to verify the correct patient, procedure, equipment, support staff and site/side marked as required.  Timeout called at: 4/9/2024 11:10 AM.  Patient understanding: patient states understanding of the procedure being performed  Patient identity confirmed: verbally with patient    Debridement Details  Performed by: physician  Debridement type: surgical  Level of debridement: subcutaneous tissue  Pain control: lidocaine 4%      Post-debridement measurements  Length (cm): 0.6  Width (cm): 0.5  Depth (cm): 0.2  Percent debrided: 100%  Surface Area (cm^2): 0.3  Area Debrided (cm^2): 0.3  Volume (cm^3): 0.06    Tissue and other material debrided: subcutaneous tissue  Devitalized tissue debrided: exudate and slough  Instrument(s) utilized: curette  Bleeding: small  Hemostasis obtained with: pressure  Response to treatment: procedure was tolerated well        Plan:  Wound is improved  Debrided as above  Continue wound management with Xeroform, see wound orders below  Continue compression with Coflex  Keep legs elevated whenever seated and avoid " prolonged standing  Follow-up in 1 week with Dr. Carlin or call sooner with questions or concerns    Wound 03/26/24 Venous Ulcer Leg Left;Medial (Active)   Wound Image Images linked 04/09/24 1122   Wound Description Yellow;Slough;Pink 04/09/24 1116   Angelika-wound Assessment [place] 04/09/24 1116   Wound Length (cm) 0.6 cm 04/09/24 1116   Wound Width (cm) 0.5 cm 04/09/24 1116   Wound Depth (cm) 0.1 cm 04/09/24 1116   Wound Surface Area (cm^2) 0.3 cm^2 04/09/24 1116   Wound Volume (cm^3) 0.03 cm^3 04/09/24 1116   Calculated Wound Volume (cm^3) 0.03 cm^3 04/09/24 1116   Change in Wound Size % 0 04/09/24 1116   Drainage Amount Small 04/09/24 1116   Drainage Description Serosanguineous;Tan 04/09/24 1116   Non-staged Wound Description Full thickness 04/09/24 1116   Dressing Status Intact 04/09/24 1116       Wound 03/26/24 Venous Ulcer Leg Left;Medial (Active)   Date First Assessed/Time First Assessed: 03/26/24 1139   Primary Wound Type: Venous Ulcer  Location: Leg  Wound Location Orientation: Left;Medial       Subjective:      .    Patient presents for follow-up of left lower extremity venous ulcer.  No increased pain or drainage.  Has been had Xeroform on the wound and a Coflex for compression this past week.  Patient had venous testing done this morning and has an appointment this afternoon with vascular        The following portions of the patient's history were reviewed and updated as appropriate: She  has a past medical history of Blood clot in vein (1999), COVID-19 (12/2021), Hypertension, and Ulcer of right shin (HCC).  She   Patient Active Problem List    Diagnosis Date Noted    UTI symptoms 02/04/2024    Thrombophlebitis 01/26/2022    Diarrhea 01/13/2022    COVID-19 01/11/2022    Elevated liver enzymes 01/11/2022    Hypokalemia 01/11/2022    Chronic venous hypertension (idiopathic) with ulcer of right lower extremity (HCC) 01/03/2022    Venous ulcer of right lower extremity with varicose veins (HCC) 12/27/2021     Primary hypertension 12/22/2021    Cellulitis of right anterior lower leg 12/15/2021     She  reports that she has never smoked. She has never used smokeless tobacco. She reports that she does not currently use alcohol. She reports that she does not use drugs.  Current Outpatient Medications   Medication Sig Dispense Refill    Acetaminophen 325 MG CAPS Take 3 capsules (975 mg total) by mouth every 8 (eight) hours as needed (mild pain) 30 capsule 0    betamethasone dipropionate (DIPROSONE) 0.05 % cream Apply topically as needed (for redness) (Patient not taking: Reported on 8/1/2022) 30 g 0    traMADol (ULTRAM) 50 mg tablet Take 1 tablet (50 mg total) by mouth every 6 (six) hours as needed for moderate pain (Patient not taking: Reported on 8/1/2022) 30 tablet 0     No current facility-administered medications for this visit.     She is allergic to wound dressing adhesive..    Review of Systems   Constitutional:  Negative for chills and fever.   HENT:  Negative for congestion and sneezing.    Respiratory:  Negative for cough.    Cardiovascular:  Positive for leg swelling.   Skin:  Positive for wound.   Psychiatric/Behavioral:  Negative for agitation.          Objective:       Wound 03/26/24 Venous Ulcer Leg Left;Medial (Active)   Wound Image Images linked 04/09/24 1122   Wound Description Yellow;Slough;Pink 04/09/24 1116   Angelika-wound Assessment [place] 04/09/24 1116   Wound Length (cm) 0.6 cm 04/09/24 1116   Wound Width (cm) 0.5 cm 04/09/24 1116   Wound Depth (cm) 0.1 cm 04/09/24 1116   Wound Surface Area (cm^2) 0.3 cm^2 04/09/24 1116   Wound Volume (cm^3) 0.03 cm^3 04/09/24 1116   Calculated Wound Volume (cm^3) 0.03 cm^3 04/09/24 1116   Change in Wound Size % 0 04/09/24 1116   Drainage Amount Small 04/09/24 1116   Drainage Description Serosanguineous;Tan 04/09/24 1116   Non-staged Wound Description Full thickness 04/09/24 1116   Dressing Status Intact 04/09/24 1116       BP (!) 176/94   Pulse 96   Temp 97.5 °F (36.4  °C)   Resp 20     Physical Exam        Wound 03/26/24 Venous Ulcer Leg Left;Medial (Active)   Wound Image   04/09/24 1122   Wound Description Yellow;Slough;Pink 04/09/24 1116   Angelika-wound Assessment [place] 04/09/24 1116   Wound Length (cm) 0.6 cm 04/09/24 1116   Wound Width (cm) 0.5 cm 04/09/24 1116   Wound Depth (cm) 0.1 cm 04/09/24 1116   Wound Surface Area (cm^2) 0.3 cm^2 04/09/24 1116   Wound Volume (cm^3) 0.03 cm^3 04/09/24 1116   Calculated Wound Volume (cm^3) 0.03 cm^3 04/09/24 1116   Change in Wound Size % 0 04/09/24 1116   Drainage Amount Small 04/09/24 1116   Drainage Description Serosanguineous;Tan 04/09/24 1116   Non-staged Wound Description Full thickness 04/09/24 1116   Dressing Status Intact 04/09/24 1116                       Wound Instructions:  Orders Placed This Encounter   Procedures    Wound Procedure Treatment Venous Ulcer Left;Medial Leg     This order was created via procedure documentation    Debridement Venous Ulcer Left;Medial Leg     This order was created via procedure documentation    Wound cleansing and dressings Venous Ulcer Left;Medial Leg     Left leg wound:  Shower yes (with cast cover) or sponge bathe           Multi-layer compression wrap Instructions: Coflex to left leg  Dressings will be changes weekly at [facility].       Keep compression wrap/wraps clean and dry. If wraps are too tight and you experience numbness/tingling, call the wound center. If after hours, remove wraps or proceed to nearest E.R. and call wound center in AM.     Wrap will be changed 1 x weekly     Avoid prolonged standing in one place.     Elevate leg(s) above the level of the heart when sitting or as much as possible     Standing Status:   Future     Standing Expiration Date:   4/16/2024        Diagnosis ICD-10-CM Associated Orders   1. Chronic venous hypertension (idiopathic) with ulcer of right lower extremity (HCC)  I87.311 Wound Procedure Treatment Venous Ulcer Left;Medial Leg    L97.919 Wound cleansing and  dressings Venous Ulcer Left;Medial Leg     lidocaine (LMX) 4 % cream

## 2024-04-09 NOTE — ASSESSMENT & PLAN NOTE
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      Patient presents to the office for evaluation of left distal medial lower leg venous ulceration x3-4 weeks.  She is established at wound care.  She had venous reflux study done today    -LLE - 1 cm x 0.7 cm X 0.2 cm left distal medial lower leg venous stasis ulceration with fibrinous tissue at wound base  -LEVDR showed right deep and superficial venous incompetency and superficial left lower extremity incompetency  -Unna boot dressing replaced left lower extremity  -Advised leg elevation 2 times a day for 30 minutes each  -Moisturize skin daily to maintain skin integrity  -Continue local wound care   -Follow-up in the office with Dr. Baer to recheck left lower extremity

## 2024-04-09 NOTE — PATIENT INSTRUCTIONS
Orders Placed This Encounter   Procedures    Wound Procedure Treatment Venous Ulcer Left;Medial Leg     This order was created via procedure documentation    Debridement Venous Ulcer Left;Medial Leg     This order was created via procedure documentation    Wound cleansing and dressings Venous Ulcer Left;Medial Leg     Left leg wound:  Shower yes (with cast cover) or sponge bathe           Multi-layer compression wrap Instructions: Coflex to left leg  Dressings will be changes weekly at [facility].       Keep compression wrap/wraps clean and dry. If wraps are too tight and you experience numbness/tingling, call the wound center. If after hours, remove wraps or proceed to nearest E.R. and call wound center in AM.     Wrap will be changed 1 x weekly     Avoid prolonged standing in one place.     Elevate leg(s) above the level of the heart when sitting or as much as possible     Standing Status:   Future     Standing Expiration Date:   4/16/2024

## 2024-04-09 NOTE — PROGRESS NOTES
Wound Procedure Treatment Venous Ulcer Left;Medial Leg    Performed by: Elva Booth RN  Authorized by: Karyna Seo DO  Associated wounds:   Wound 03/26/24 Venous Ulcer Leg Left;Medial  Wound cleansed with:  Soap and water and NSS  Applied to periwound:  Moisture lotion  Applied primary dressing:  Other  Applied secondary dressing:  Gauze  Wound secured with:  Compression wrap    Compression Pre-Procedure Details  Sensation:  Normal  Skin color:  Pink    Procedure Details  Laterality:  Left  Location:  Leg  Cast type:  Multi-layer compression short leg  Supplies:  2 layer wrap  Xeroform cut to fit  and applied to the left leg wound then covered with gauze.

## 2024-04-15 ENCOUNTER — OFFICE VISIT (OUTPATIENT)
Dept: VASCULAR SURGERY | Facility: CLINIC | Age: 64
End: 2024-04-15
Payer: COMMERCIAL

## 2024-04-15 VITALS
DIASTOLIC BLOOD PRESSURE: 68 MMHG | HEIGHT: 62 IN | SYSTOLIC BLOOD PRESSURE: 144 MMHG | WEIGHT: 213 LBS | BODY MASS INDEX: 39.2 KG/M2 | OXYGEN SATURATION: 97 % | HEART RATE: 74 BPM

## 2024-04-15 DIAGNOSIS — I87.311 CHRONIC VENOUS HYPERTENSION (IDIOPATHIC) WITH ULCER OF RIGHT LOWER EXTREMITY (HCC): Primary | ICD-10-CM

## 2024-04-15 DIAGNOSIS — L97.919 CHRONIC VENOUS HYPERTENSION (IDIOPATHIC) WITH ULCER OF RIGHT LOWER EXTREMITY (HCC): Primary | ICD-10-CM

## 2024-04-15 PROCEDURE — 99213 OFFICE O/P EST LOW 20 MIN: CPT | Performed by: SURGERY

## 2024-04-15 NOTE — PATIENT INSTRUCTIONS
Presents with a small venous ulcer medial malleolus on the left leg minimal area of hyperpigmentation the ulcer is extremely superficial and should resolve with Unna boot therapy.  She does have chronic venous insufficiency both deep venous and superficial if down the road the ulcer is not improving or enlarging laser ablation would be indicated of the greater saphenous vein.    Plan: Follow-up on an as-needed basis should the ulcer not heal we will see her back in the office and plan laser ablation.

## 2024-04-15 NOTE — PROGRESS NOTES
Assessment/Plan:    Presents with a small venous ulcer medial malleolus on the left leg minimal area of hyperpigmentation the ulcer is extremely superficial and should resolve with Unna boot therapy.  She does have chronic venous insufficiency both deep venous and superficial if down the road the ulcer is not improving or enlarging laser ablation would be indicated of the greater saphenous vein.    Plan: Follow-up on an as-needed basis should the ulcer not heal we will see her back in the office and plan laser ablation.       Problem List Items Addressed This Visit          Cardiovascular and Mediastinum    Chronic venous hypertension (idiopathic) with ulcer of right lower extremity (HCC) - Primary     Presents with a small venous ulcer medial malleolus on the left leg minimal area of hyperpigmentation the ulcer is extremely superficial and should resolve with Unna boot therapy.  She does have chronic venous insufficiency both deep venous and superficial if down the road the ulcer is not improving or enlarging laser ablation would be indicated of the greater saphenous vein.    Plan: Follow-up on an as-needed basis should the ulcer not heal we will see her back in the office and plan laser ablation.              Subjective:      Patient ID: Jessi Vann is a 63 y.o. female.    Patient is here today for a wound check of the left leg. Her patience boot was removed without incident. Patient has a small wound remaining on the left medial leg. Patient is s/p a right EVLT with multiple stab phlebectomies done 6/24/2022 by Dr. Baer. Patient continues to take antibiotic(Keflex) for wound. She c/o some pain at the wound site. She is a non-smoker.     Wound Check        The following portions of the patient's history were reviewed and updated as appropriate: allergies, current medications, past family history, past medical history, past social history, past surgical history, and problem list.    Review of Systems   Constitutional:  "Negative.    HENT: Negative.     Eyes: Negative.    Respiratory: Negative.     Cardiovascular: Negative.    Gastrointestinal: Negative.    Endocrine: Negative.    Genitourinary: Negative.    Musculoskeletal: Negative.    Skin:  Positive for wound.   Allergic/Immunologic: Negative.    Neurological: Negative.    Hematological: Negative.    Psychiatric/Behavioral: Negative.           Objective:      There were no vitals taken for this visit.         Physical Exam    Evaluation bilateral lower extremities on the left small 0.5 mm x 0.5 mm venous stasis ulceration minimal hyperpigmentation, no cellulitis no lipodermatosclerosis.     I have reviewed and made appropriate changes to the review of systems input by the medical assistant.    Vitals:    04/15/24 1111   BP: 144/68   BP Location: Right arm   Patient Position: Sitting   Cuff Size: Standard   Pulse: 74   SpO2: 97%   Weight: 96.6 kg (213 lb)   Height: 5' 2\" (1.575 m)       Patient Active Problem List   Diagnosis    Cellulitis of right anterior lower leg    Primary hypertension    Venous ulcer of left leg (HCC)    Chronic venous hypertension (idiopathic) with ulcer of right lower extremity (HCC)    COVID-19    Elevated liver enzymes    Hypokalemia    Diarrhea    Thrombophlebitis    UTI symptoms       Past Surgical History:   Procedure Laterality Date    FRACTURE SURGERY Right 1984    with hardware    DC ENDOVEN ABLTJ INCMPTNT VEIN XTR LASER 1ST VEIN Right 6/24/2022    Procedure: ENDOVASCULAR LASER THERAPY (EVLT) right leg with multiple stab phlebectomies;  Surgeon: Sohail Baer MD;  Location:  MAIN OR;  Service: Vascular    DC STAB PHLEBT VARICOSE VEINS 1 XTR > 20 INCS Right 6/24/2022    Procedure: MULTIPLE STAB PHLEBECTOMIES, 10 stabs;  Surgeon: Sohail Baer MD;  Location:  MAIN OR;  Service: Vascular       Family History   Problem Relation Age of Onset    No Known Problems Mother     No Known Problems Father     No Known Problems Sister     No Known " Problems Brother     No Known Problems Daughter     No Known Problems Son     No Known Problems Maternal Aunt     No Known Problems Maternal Uncle     No Known Problems Paternal Aunt     No Known Problems Paternal Uncle     No Known Problems Maternal Grandmother     No Known Problems Maternal Grandfather     No Known Problems Paternal Grandmother     No Known Problems Paternal Grandfather     No Known Problems Cousin        Social History     Socioeconomic History    Marital status: /Civil Union     Spouse name: Not on file    Number of children: Not on file    Years of education: Not on file    Highest education level: Not on file   Occupational History    Not on file   Tobacco Use    Smoking status: Never    Smokeless tobacco: Never   Vaping Use    Vaping status: Never Used   Substance and Sexual Activity    Alcohol use: Not Currently    Drug use: Never    Sexual activity: Not Currently   Other Topics Concern    Not on file   Social History Narrative    Not on file     Social Determinants of Health     Financial Resource Strain: Not on file   Food Insecurity: No Food Insecurity (1/12/2022)    Hunger Vital Sign     Worried About Running Out of Food in the Last Year: Never true     Ran Out of Food in the Last Year: Never true   Transportation Needs: Unknown (1/12/2022)    PRAPARE - Transportation     Lack of Transportation (Medical): No     Lack of Transportation (Non-Medical): Not on file   Physical Activity: Not on file   Stress: Not on file   Social Connections: Not on file   Intimate Partner Violence: Not on file   Housing Stability: Unknown (1/12/2022)    Housing Stability Vital Sign     Unable to Pay for Housing in the Last Year: No     Number of Places Lived in the Last Year: Not on file     Unstable Housing in the Last Year: No       Allergies   Allergen Reactions    Wound Dressing Adhesive Rash     bandaid adhesives         Current Outpatient Medications:     Acetaminophen 325 MG CAPS, Take 3  capsules (975 mg total) by mouth every 8 (eight) hours as needed (mild pain), Disp: 30 capsule, Rfl: 0    cephalexin (KEFLEX) 500 mg capsule, Take 1 capsule (500 mg total) by mouth every 6 (six) hours for 7 days, Disp: 28 capsule, Rfl: 0    fluocinolone (SYNALAR) 0.01 % external solution, Apply topically 2 (two) times a day Apply to area of inflammation twice daily as needed for flareups/burning.  Do not use for more than 7 days at a time.  Not for daily use, Disp: 60 mL, Rfl: 1    betamethasone dipropionate (DIPROSONE) 0.05 % cream, Apply topically as needed (for redness) (Patient not taking: Reported on 8/1/2022), Disp: 30 g, Rfl: 0    traMADol (ULTRAM) 50 mg tablet, Take 1 tablet (50 mg total) by mouth every 6 (six) hours as needed for moderate pain (Patient not taking: Reported on 8/1/2022), Disp: 30 tablet, Rfl: 0

## 2024-04-16 ENCOUNTER — OFFICE VISIT (OUTPATIENT)
Dept: WOUND CARE | Facility: HOSPITAL | Age: 64
End: 2024-04-16
Payer: COMMERCIAL

## 2024-04-16 VITALS
RESPIRATION RATE: 18 BRPM | HEART RATE: 73 BPM | TEMPERATURE: 96.5 F | SYSTOLIC BLOOD PRESSURE: 175 MMHG | DIASTOLIC BLOOD PRESSURE: 79 MMHG

## 2024-04-16 DIAGNOSIS — L97.929 IDIOPATHIC CHRONIC VENOUS HYPERTENSION OF LEFT LOWER EXTREMITY WITH ULCER (HCC): Primary | ICD-10-CM

## 2024-04-16 DIAGNOSIS — M25.571 ACUTE RIGHT ANKLE PAIN: ICD-10-CM

## 2024-04-16 DIAGNOSIS — I87.312 IDIOPATHIC CHRONIC VENOUS HYPERTENSION OF LEFT LOWER EXTREMITY WITH ULCER (HCC): Primary | ICD-10-CM

## 2024-04-16 PROCEDURE — 11042 DBRDMT SUBQ TIS 1ST 20SQCM/<: CPT | Performed by: PODIATRIST

## 2024-04-16 RX ORDER — LIDOCAINE 40 MG/G
CREAM TOPICAL ONCE
Status: COMPLETED | OUTPATIENT
Start: 2024-04-16 | End: 2024-04-16

## 2024-04-16 RX ADMIN — LIDOCAINE: 40 CREAM TOPICAL at 11:11

## 2024-04-16 NOTE — PROGRESS NOTES
Cast Application    Date/Time: 4/16/2024 10:45 AM    Performed by: Marleni Briggs RN  Authorized by: Freida Carlin DPM  Universal Protocol:  Consent: Verbal consent obtained.  Risks and benefits: risks, benefits and alternatives were discussed  Consent given by: patient  Patient identity confirmed: verbally with patient    Pre-procedure details:     Sensation:  Normal  Procedure details:     Laterality:  Left    Location:  Leg    Leg:  L lower legCast type:  Unna boot        Supplies:  2 layer wrap  Post-procedure details:     Pain:  Improved    Sensation:  Normal    Patient tolerance of procedure:  Tolerated well, no immediate complications  Comments:      UNNA BOOT wrap procedure     Before application, JOHANA and/or TBI determined to be adequate for healing and application of compression. Lower extremity washed prior to application of compression wrap. With the foot in dorsiflexed position, Unna boot was applied as per physician orders without complications or complaint of pain.  The procedure was tolerated well. Toes warm & pink post application.  Patient provided education & reinforced to observe toes for any discoloration, swelling or tingling and instructed when to report to the Wound Center or to remove compression. Wound care as per providers orders, patient tolerated well.

## 2024-04-16 NOTE — PROGRESS NOTES
Wound Procedure Treatment Venous Ulcer Left;Medial Leg    Performed by: Marleni Briggs RN  Authorized by: Freida Carlin DPM  Associated wounds:   Wound 03/26/24 Venous Ulcer Leg Left;Medial  Wound cleansed with:  NSS  Applied to periwound:  Zinc oxide paste    Cutimed Sorbact, gauze, unna boot

## 2024-04-16 NOTE — PATIENT INSTRUCTIONS
Orders Placed This Encounter   Procedures    Debridement Venous Ulcer Left;Medial Leg     This order was created via procedure documentation    Wound cleansing and dressings Venous Ulcer Left;Medial Leg     Keep unna boot clean dry and intact.   Elevate your leg if it feels tight.  Do not cut it off.     Standing Status:   Future     Standing Expiration Date:   4/23/2024    XR ankle 3+ vw right     Standing Status:   Future     Standing Expiration Date:   4/16/2028     Scheduling Instructions:      Bring along any outside films relating to this procedure.

## 2024-04-16 NOTE — PROGRESS NOTES
Patient ID: Jessi Vann is a 63 y.o. female Date of Birth 1960       Chief Complaint   Patient presents with    Follow Up Wound Care Visit     LLE wound       Allergies:  Wound dressing adhesive    Diagnosis:  1. Idiopathic chronic venous hypertension of left lower extremity with ulcer (HCC)  -     lidocaine (LMX) 4 % cream    2. Acute right ankle pain  -     XR ankle 3+ vw right; Future; Expected date: 04/16/2024       Diagnosis ICD-10-CM Associated Orders   1. Idiopathic chronic venous hypertension of left lower extremity with ulcer (HCC)  I87.312 lidocaine (LMX) 4 % cream    L97.929       2. Acute right ankle pain  M25.571 XR ankle 3+ vw right           Assessment & Plan:  Chronic venous stasis ulceration anterior left lower extremity, wound was debrided through subcuticular tissues, dressed with cutting med, hydrogel dry dressing and Unna boot for compression.  She is to leave this dry clean and intact for 1 week.  Patient complains of acute onset right ankle pain from this morning, she does not relate any history of trauma, manual muscle testing is within normal limits, there is minimal edema noted but she does wear compression, the joint is not red, hot, swollen, x-ray was ordered, she is advised to take over-the-counter ibuprofen or Aleve, if pain persist in the next day or 2 to follow-up with PCP, podiatry or orthopedic surgery.  I personally viewed patient's lower extremity venous Doppler results, vascular surgery note, at this time we will proceed with local wound care and compression.  Frequent elevation, low-sodium diet, proper protein intake, strict pressure and fracture reduction, compression was reviewed with patient, she understands and agrees with the plan and will follow-up in 1 week.    Debridement   Wound 03/26/24 Venous Ulcer Leg Left;Medial    Universal Protocol:  Consent: Verbal consent obtained.  Risks and benefits: risks, benefits and alternatives were discussed  Consent given by:  "patient  Time out: Immediately prior to procedure a \"time out\" was called to verify the correct patient, procedure, equipment, support staff and site/side marked as required.  Patient understanding: patient states understanding of the procedure being performed  Patient identity confirmed: verbally with patient    Debridement Details  Performed by: physician  Debridement type: surgical  Level of debridement: subcutaneous tissue  Pain control: lidocaine 4%      Post-debridement measurements  Length (cm): 0.4  Width (cm): 0.4  Depth (cm): 0.3  Percent debrided: 100%  Surface Area (cm^2): 0.16  Area Debrided (cm^2): 0.16  Volume (cm^3): 0.05    Tissue and other material debrided: subcutaneous tissue  Devitalized tissue debrided: biofilm, fibrin, necrotic debris and slough  Instrument(s) utilized: blade  Bleeding: small  Hemostasis obtained with: pressure  Procedural pain (0-10): insensate  Post-procedural pain: insensate   Response to treatment: procedure was tolerated well         Subjective:   Jessi presents today for care of left lower leg venous stasis ulceration, she had her lower extremity arterial Dopplers, was seen by vascular surgery, she states the Unna boot feels the most secure with compression and does not slide down and would prefer to have this in the future.  She also states she has right sudden onset ankle pain, she went to sleep last night and was fine, woke up this morning with pain, no history of trauma or history of gout.          The following portions of the patient's history were reviewed and updated as appropriate:   Patient Active Problem List   Diagnosis    Cellulitis of right anterior lower leg    Primary hypertension    Venous ulcer of left leg (HCC)    Chronic venous hypertension (idiopathic) with ulcer of right lower extremity (HCC)    COVID-19    Elevated liver enzymes    Hypokalemia    Diarrhea    Thrombophlebitis    UTI symptoms     Past Medical History:   Diagnosis Date    Blood clot in " vein 1999    RLE, post partum    COVID-19 12/2021    Hypertension     Ulcer of right shin (HCC)      Past Surgical History:   Procedure Laterality Date    FRACTURE SURGERY Right 1984    with hardware    CO ENDOVEN ABLTJ INCMPTNT VEIN XTR LASER 1ST VEIN Right 6/24/2022    Procedure: ENDOVASCULAR LASER THERAPY (EVLT) right leg with multiple stab phlebectomies;  Surgeon: Sohail Baer MD;  Location: [facility];  Service: Vascular    CO STAB PHLEBT VARICOSE VEINS 1 XTR > 20 INCS Right 6/24/2022    Procedure: MULTIPLE STAB PHLEBECTOMIES, 10 stabs;  Surgeon: Sohail Baer MD;  Location:  MAIN OR;  Service: Vascular     Social History     Socioeconomic History    Marital status: /Civil Union     Spouse name: None    Number of children: None    Years of education: None    Highest education level: None   Occupational History    None   Tobacco Use    Smoking status: Never    Smokeless tobacco: Never   Vaping Use    Vaping status: Never Used   Substance and Sexual Activity    Alcohol use: Not Currently    Drug use: Never    Sexual activity: Not Currently   Other Topics Concern    None   Social History Narrative    None     Social Determinants of Health     Financial Resource Strain: Not on file   Food Insecurity: No Food Insecurity (1/12/2022)    Hunger Vital Sign     Worried About Running Out of Food in the Last Year: Never true     Ran Out of Food in the Last Year: Never true   Transportation Needs: Unknown (1/12/2022)    PRAPARE - Transportation     Lack of Transportation (Medical): No     Lack of Transportation (Non-Medical): Not on file   Physical Activity: Not on file   Stress: Not on file   Social Connections: Not on file   Intimate Partner Violence: Not on file   Housing Stability: Unknown (1/12/2022)    Housing Stability Vital Sign     Unable to Pay for Housing in the Last Year: No     Number of Places Lived in the Last Year: Not on file     Unstable Housing in the Last Year: No        Current  Outpatient Medications:     Acetaminophen 325 MG CAPS, Take 3 capsules (975 mg total) by mouth every 8 (eight) hours as needed (mild pain), Disp: 30 capsule, Rfl: 0    betamethasone dipropionate (DIPROSONE) 0.05 % cream, Apply topically as needed (for redness) (Patient not taking: Reported on 8/1/2022), Disp: 30 g, Rfl: 0    cephalexin (KEFLEX) 500 mg capsule, Take 1 capsule (500 mg total) by mouth every 6 (six) hours for 7 days, Disp: 28 capsule, Rfl: 0    fluocinolone (SYNALAR) 0.01 % external solution, Apply topically 2 (two) times a day Apply to area of inflammation twice daily as needed for flareups/burning.  Do not use for more than 7 days at a time.  Not for daily use, Disp: 60 mL, Rfl: 1    traMADol (ULTRAM) 50 mg tablet, Take 1 tablet (50 mg total) by mouth every 6 (six) hours as needed for moderate pain (Patient not taking: Reported on 8/1/2022), Disp: 30 tablet, Rfl: 0  No current facility-administered medications for this visit.  Family History   Problem Relation Age of Onset    No Known Problems Mother     No Known Problems Father     No Known Problems Sister     No Known Problems Brother     No Known Problems Daughter     No Known Problems Son     No Known Problems Maternal Aunt     No Known Problems Maternal Uncle     No Known Problems Paternal Aunt     No Known Problems Paternal Uncle     No Known Problems Maternal Grandmother     No Known Problems Maternal Grandfather     No Known Problems Paternal Grandmother     No Known Problems Paternal Grandfather     No Known Problems Cousin        Review of Systems   Constitutional:  Negative for chills and fever.   HENT:  Negative for ear pain and sore throat.    Eyes:  Negative for pain and visual disturbance.   Respiratory:  Negative for cough and shortness of breath.    Cardiovascular:  Negative for chest pain and palpitations.   Gastrointestinal:  Negative for abdominal pain and vomiting.   Genitourinary:  Negative for dysuria and hematuria.    Musculoskeletal:  Positive for gait problem. Negative for arthralgias and back pain.   Skin:  Positive for color change and wound. Negative for rash.   Neurological:  Positive for numbness. Negative for seizures and syncope.   Psychiatric/Behavioral: Negative.     All other systems reviewed and are negative.        Objective:  BP (!) 175/79   Pulse 73   Temp (!) 96.5 °F (35.8 °C)   Resp 18     Physical Exam  Constitutional:       Appearance: Normal appearance. She is well-developed. She is obese.   HENT:      Head: Normocephalic and atraumatic.      Mouth/Throat:      Mouth: Mucous membranes are moist.      Pharynx: Oropharynx is clear.   Eyes:      Conjunctiva/sclera: Conjunctivae normal.      Pupils: Pupils are equal, round, and reactive to light.   Cardiovascular:      Pulses:           Dorsalis pedis pulses are 2+ on the right side and 2+ on the left side.        Posterior tibial pulses are 2+ on the right side and 2+ on the left side.   Pulmonary:      Effort: Pulmonary effort is normal.   Musculoskeletal:         General: Normal range of motion.      Cervical back: Normal range of motion.      Right lower le+ Pitting Edema present.      Left lower le+ Pitting Edema present.      Comments: With pain on palpation posterior to medial and lateral malleoli, range of motion is within normal limits, manual muscle testing is within normal limits, the joint is not red, hot, minimal generalized edema is noted to right lower leg secondary to venous hypertension, no crepitus is noted, joint is in good alignment.   Skin:     General: Skin is warm and dry.      Capillary Refill: Capillary refill takes less than 2 seconds.   Neurological:      General: No focal deficit present.      Mental Status: She is alert and oriented to person, place, and time. Mental status is at baseline.   Psychiatric:         Mood and Affect: Mood normal.         Behavior: Behavior normal.         Thought Content: Thought content  normal.         Judgment: Judgment normal.           Wound 03/26/24 Venous Ulcer Leg Left;Medial (Active)   Wound Image   04/16/24 1122   Wound Description Yellow;Slough;[place] 04/16/24 1111   Angelika-wound Assessment Pink;Edema 04/16/24 1111   Wound Length (cm) 0.4 cm 04/16/24 1111   Wound Width (cm) 0.4 cm 04/16/24 1111   Wound Depth (cm) 0.2 cm 04/16/24 1111   Wound Surface Area (cm^2) 0.16 cm^2 04/16/24 1111   Wound Volume (cm^3) 0.032 cm^3 04/16/24 1111   Calculated Wound Volume (cm^3) 0.03 cm^3 04/16/24 1111   Change in Wound Size % 0 04/16/24 1111   Drainage Amount Small 04/16/24 1111   Drainage Description Yellow 04/16/24 1111   Non-staged Wound Description Full thickness 04/16/24 1111   Dressing Status Intact 04/16/24 1111                VAS Lower extremity venous insufficiency duplex, bilateral w/ measurements    Result Date: 4/9/2024  Narrative:  [facility]: Indications: Chronic venous hypertension (idiopathic) with ulcer of right lower extremity [I87.311]. Patient presents with history of Bilateral lower extremity varicose veins, bilateral non-healing ulcers currently on left ankle.  Patient reports she has pain when walking.  Patient has been wearing therapeutic compression stockings for many years. Operative History: 2022-06-24 Right EVLT Right EVLT Risk Factors The patient has history of Obesity, HTN and DVT.  FINDINGS:  Right           Impression  AP (mm)  Reflux  Valve Closure Time  GSV Inguinal                   12.2  Reflux                1.09  GSV Prox Thigh                  6.9  Reflux                3.20  CFV                                  Reflux                1.43  GSV Mid Thigh   EVLT                                             GSV Dist Thigh                  7.4  Reflux                2.86  GSV Knee                        7.8  Reflux                2.47  GSV Prox Calf                   3.4  Reflux                1.96  GSV Mid Calf                    2.7  Reflux                 1.15  GSV Dist Calf                   2.8  Reflux                1.56  GSV Ankle                       2.4  Reflux                1.38  SSV Knee                        4.0  Reflux                0.90  SSV Mid Calf                    2.9  Reflux                1.70  SSV Ankle                       3.7  Reflux                1.76   Left            AP (mm)  Reflux  Valve Closure Time  GSV Inguinal       11.1  Reflux                1.52  GSV Prox Thigh      9.2  Reflux                2.70  CFV                      Reflux                1.66  GSV Mid Thigh       8.4  Reflux                2.65  GSV Dist Thigh      4.5  Reflux                7.07  GSV Knee            7.2  Reflux                3.85  GSV Prox Calf       5.8  Reflux                3.91  GSV Mid Calf        6.0  Reflux                2.16  GSV Dist Calf       2.0  Reflux                0.60  GSV Ankle           1.6                                 CONCLUSION: Impression: RIGHT LIMB: Deep venous incompetence is noted in the common femoral vein. The great saphenous vein is incompetent from proximal thigh throughout ankle, not visualized in mid thigh post EVLT. The great saphenous vein does not remain within the saphenous compartment in the thigh. The small saphenous vein is incompetent and communicates with the popliteal vein. Multiple varicosities are noted at the of the level of the mid calf to ankle. There is no evidence of deep vein thrombosis in the CFV, the proximal PFV, the femoral vein and the popliteal vein.  LEFT LIMB: Deep venous incompetence is noted in the common femoral vein. The great saphenous vein is incompetent from proximal thigh to ankle. The great saphenous vein does not remain within the saphenous compartment in the thigh. The small saphenous vein is incompetent and communicates with the popliteal vein. Multiple varicosities are noted at the of the level of the mid calf to ankle. There is no evidence of deep vein thrombosis in the  "CFV, the proximal PFV, the femoral vein and the popliteal vein.  Study performed with patient in steep Reverse Trendelenburg.  SIGNATURE: Electronically Signed by: SAMANTHA HADLEY MD, RPVI on 2024-04-09 11:37:57 AM      Wound Instructions:  Orders Placed This Encounter   Procedures    Debridement     This order was created via procedure documentation    XR ankle 3+ vw right     Standing Status:   Future     Standing Expiration Date:   4/16/2028     Scheduling Instructions:      Bring along any outside films relating to this procedure.               Freida Carlin DPM, KELVIN, FACFAS    Portions of the record may have been created with voice recognition software. Occasional wrong word or \"sound a like\" substitutions may have occurred due to the inherent limitations of voice recognition software. Read the chart carefully and recognize, using context, where substitutions have occurred.    "

## 2024-04-23 ENCOUNTER — OFFICE VISIT (OUTPATIENT)
Dept: WOUND CARE | Facility: HOSPITAL | Age: 64
End: 2024-04-23
Payer: COMMERCIAL

## 2024-04-23 VITALS
RESPIRATION RATE: 18 BRPM | TEMPERATURE: 97.2 F | SYSTOLIC BLOOD PRESSURE: 170 MMHG | DIASTOLIC BLOOD PRESSURE: 82 MMHG | HEART RATE: 75 BPM

## 2024-04-23 DIAGNOSIS — L97.929 IDIOPATHIC CHRONIC VENOUS HYPERTENSION OF LEFT LOWER EXTREMITY WITH ULCER (HCC): Primary | ICD-10-CM

## 2024-04-23 DIAGNOSIS — I87.312 IDIOPATHIC CHRONIC VENOUS HYPERTENSION OF LEFT LOWER EXTREMITY WITH ULCER (HCC): Primary | ICD-10-CM

## 2024-04-23 PROCEDURE — 11042 DBRDMT SUBQ TIS 1ST 20SQCM/<: CPT | Performed by: PODIATRIST

## 2024-04-23 RX ORDER — LIDOCAINE 40 MG/G
CREAM TOPICAL ONCE
Status: COMPLETED | OUTPATIENT
Start: 2024-04-23 | End: 2024-04-23

## 2024-04-23 RX ADMIN — LIDOCAINE: 40 CREAM TOPICAL at 10:53

## 2024-04-23 NOTE — PROGRESS NOTES
Wound Procedure Treatment Venous Ulcer Left;Medial Leg    Performed by: Rakel Lincoln RN  Authorized by: Freida Carlin DPM  Associated wounds:   Wound 03/26/24 Venous Ulcer Leg Left;Medial  Wound cleansed with:  Soap and water  Applied to periwound:  Moisture lotion  Applied primary dressing:  Non adherent contact layer  Applied secondary dressing:  Gauze  Wound secured with:  Compression wrap    Unna boot applied

## 2024-04-23 NOTE — PATIENT INSTRUCTIONS
Orders Placed This Encounter   Procedures    Wound cleansing and dressings Venous Ulcer Left;Medial Leg     Unna Boot/ Multi-layer compression wrap Instructions    Keep compression wrap/wraps clean and dry. If wraps are too tight and you experience numbness/tingling, call the wound center. If after hours, remove wraps or proceed to nearest E.R. and call wound center in AM.    Wrap will be changed 1 x weekly    Avoid prolonged standing in one place.    Elevate leg(s) above the level of the heart when sitting or as much as possible.     Standing Status:   Future     Standing Expiration Date:   4/30/2024

## 2024-04-23 NOTE — PROGRESS NOTES
"Patient ID: Jessi Vann is a 63 y.o. female Date of Birth 1960       Chief Complaint   Patient presents with    Follow Up Wound Care Visit     LLE       Allergies:  Wound dressing adhesive    Diagnosis:  1. Idiopathic chronic venous hypertension of left lower extremity with ulcer (HCC)  -     lidocaine (LMX) 4 % cream  -     Wound cleansing and dressings Venous Ulcer Left;Medial Leg; Future; Expected date: 04/23/2024  -     Wound Procedure Treatment Venous Ulcer Left;Medial Leg       Diagnosis ICD-10-CM Associated Orders   1. Idiopathic chronic venous hypertension of left lower extremity with ulcer (HCC)  I87.312 lidocaine (LMX) 4 % cream    L97.929 Wound cleansing and dressings Venous Ulcer Left;Medial Leg     Wound Procedure Treatment Venous Ulcer Left;Medial Leg           Assessment & Plan:  Chronic venous stasis ulceration left lower leg, wound was debrided through subcuticular tissues and dressed with Adaptic dry dressing and Unna boot, she is to leave this dry clean and intact for 1 week, she may use a cast cover to shower.  Today we reviewed frequent elevation, low-sodium diet, proper protein intake, she understands and agrees with the plan and will follow-up in 1 week.    Debridement   Wound 03/26/24 Venous Ulcer Leg Left;Medial    Universal Protocol:  Consent: Verbal consent obtained.  Risks and benefits: risks, benefits and alternatives were discussed  Consent given by: patient  Time out: Immediately prior to procedure a \"time out\" was called to verify the correct patient, procedure, equipment, support staff and site/side marked as required.  Patient understanding: patient states understanding of the procedure being performed  Patient identity confirmed: verbally with patient    Debridement Details  Performed by: physician  Debridement type: surgical  Level of debridement: subcutaneous tissue  Pain control: lidocaine 4%      Post-debridement measurements  Length (cm): 0.7  Width (cm): 0.7  Depth (cm): " 0.2  Percent debrided: 100%  Surface Area (cm^2): 0.49  Area Debrided (cm^2): 0.49  Volume (cm^3): 0.1    Tissue and other material debrided: subcutaneous tissue  Devitalized tissue debrided: biofilm, fibrin, necrotic debris, slough and eschar  Instrument(s) utilized: blade  Bleeding: small  Hemostasis obtained with: pressure  Procedural pain (0-10): insensate  Post-procedural pain: insensate   Response to treatment: procedure was tolerated well         Subjective:   Jessi presents today for care of left lower leg venous stasis ulceration, she is tolerating Unna boot well and has no new complaints.          The following portions of the patient's history were reviewed and updated as appropriate:   Patient Active Problem List   Diagnosis    Cellulitis of right anterior lower leg    Primary hypertension    Venous ulcer of left leg (HCC)    Chronic venous hypertension (idiopathic) with ulcer of right lower extremity (HCC)    COVID-19    Elevated liver enzymes    Hypokalemia    Diarrhea    Thrombophlebitis    UTI symptoms     Past Medical History:   Diagnosis Date    Blood clot in vein 1999    RLE, post partum    COVID-19 12/2021    Hypertension     Ulcer of right shin (HCC)      Past Surgical History:   Procedure Laterality Date    FRACTURE SURGERY Right 1984    with hardware    NV ENDOVEN ABLTJ INCMPTNT VEIN XTR LASER 1ST VEIN Right 6/24/2022    Procedure: ENDOVASCULAR LASER THERAPY (EVLT) right leg with multiple stab phlebectomies;  Surgeon: Sohail Baer MD;  Location:  MAIN OR;  Service: Vascular    NV STAB PHLEBT VARICOSE VEINS 1 XTR > 20 INCS Right 6/24/2022    Procedure: MULTIPLE STAB PHLEBECTOMIES, 10 stabs;  Surgeon: Sohail Baer MD;  Location:  MAIN OR;  Service: Vascular     Social History     Socioeconomic History    Marital status: /Civil Union     Spouse name: None    Number of children: None    Years of education: None    Highest education level: None   Occupational History    None    Tobacco Use    Smoking status: Never    Smokeless tobacco: Never   Vaping Use    Vaping status: Never Used   Substance and Sexual Activity    Alcohol use: Not Currently    Drug use: Never    Sexual activity: Not Currently   Other Topics Concern    None   Social History Narrative    None     Social Determinants of Health     Financial Resource Strain: Not on file   Food Insecurity: No Food Insecurity (1/12/2022)    Hunger Vital Sign     Worried About Running Out of Food in the Last Year: Never true     Ran Out of Food in the Last Year: Never true   Transportation Needs: Unknown (1/12/2022)    PRAPARE - Transportation     Lack of Transportation (Medical): No     Lack of Transportation (Non-Medical): Not on file   Physical Activity: Not on file   Stress: Not on file   Social Connections: Not on file   Intimate Partner Violence: Not on file   Housing Stability: Unknown (1/12/2022)    Housing Stability Vital Sign     Unable to Pay for Housing in the Last Year: No     Number of Places Lived in the Last Year: Not on file     Unstable Housing in the Last Year: No        Current Outpatient Medications:     Acetaminophen 325 MG CAPS, Take 3 capsules (975 mg total) by mouth every 8 (eight) hours as needed (mild pain), Disp: 30 capsule, Rfl: 0    betamethasone dipropionate (DIPROSONE) 0.05 % cream, Apply topically as needed (for redness) (Patient not taking: Reported on 8/1/2022), Disp: 30 g, Rfl: 0    fluocinolone (SYNALAR) 0.01 % external solution, Apply topically 2 (two) times a day Apply to area of inflammation twice daily as needed for flareups/burning.  Do not use for more than 7 days at a time.  Not for daily use, Disp: 60 mL, Rfl: 1    traMADol (ULTRAM) 50 mg tablet, Take 1 tablet (50 mg total) by mouth every 6 (six) hours as needed for moderate pain (Patient not taking: Reported on 8/1/2022), Disp: 30 tablet, Rfl: 0  No current facility-administered medications for this visit.  Family History   Problem Relation Age  of Onset    No Known Problems Mother     No Known Problems Father     No Known Problems Sister     No Known Problems Brother     No Known Problems Daughter     No Known Problems Son     No Known Problems Maternal Aunt     No Known Problems Maternal Uncle     No Known Problems Paternal Aunt     No Known Problems Paternal Uncle     No Known Problems Maternal Grandmother     No Known Problems Maternal Grandfather     No Known Problems Paternal Grandmother     No Known Problems Paternal Grandfather     No Known Problems Cousin        Review of Systems   Constitutional:  Negative for chills and fever.   HENT:  Negative for ear pain and sore throat.    Eyes:  Negative for pain and visual disturbance.   Respiratory:  Negative for cough and shortness of breath.    Cardiovascular:  Negative for chest pain and palpitations.   Gastrointestinal:  Negative for abdominal pain and vomiting.   Genitourinary:  Negative for dysuria and hematuria.   Musculoskeletal:  Positive for gait problem. Negative for arthralgias and back pain.   Skin:  Positive for color change and wound. Negative for rash.   Neurological:  Positive for numbness. Negative for seizures and syncope.   Psychiatric/Behavioral: Negative.     All other systems reviewed and are negative.        Objective:  /82   Pulse 75   Temp (!) 97.2 °F (36.2 °C)   Resp 18     Physical Exam  Constitutional:       Appearance: Normal appearance. She is well-developed. She is obese.   HENT:      Head: Normocephalic and atraumatic.      Nose: Nose normal.      Mouth/Throat:      Mouth: Mucous membranes are moist.      Pharynx: Oropharynx is clear.   Eyes:      Conjunctiva/sclera: Conjunctivae normal.      Pupils: Pupils are equal, round, and reactive to light.   Cardiovascular:      Pulses:           Dorsalis pedis pulses are 2+ on the right side and 2+ on the left side.        Posterior tibial pulses are 2+ on the right side and 2+ on the left side.   Pulmonary:      Effort:  Pulmonary effort is normal.   Musculoskeletal:         General: Normal range of motion.      Cervical back: Normal range of motion.      Right lower le+ Pitting Edema present.      Left lower le+ Pitting Edema present.   Skin:     General: Skin is warm and dry.      Capillary Refill: Capillary refill takes less than 2 seconds.   Neurological:      General: No focal deficit present.      Mental Status: She is alert and oriented to person, place, and time. Mental status is at baseline.   Psychiatric:         Mood and Affect: Mood normal.         Behavior: Behavior normal.         Thought Content: Thought content normal.         Judgment: Judgment normal.           Wound 24 Venous Ulcer Leg Left;Medial (Active)   Wound Image   24 1102   Wound Description Brown;Eschar;Yellow;Slough 24 1051   Angelika-wound Assessment Dry;Erythema 24 1051   Wound Length (cm) 0.5 cm 24 1051   Wound Width (cm) 0.7 cm 24 1051   Wound Depth (cm) 0.1 cm 24 1051   Wound Surface Area (cm^2) 0.35 cm^2 24 1051   Wound Volume (cm^3) 0.035 cm^3 24 1051   Calculated Wound Volume (cm^3) 0.04 cm^3 24 1051   Change in Wound Size % -33.33 24 1051   Drainage Amount Small 24 1051   Drainage Description Serosanguineous 24 1051   Non-staged Wound Description Full thickness 24 1051   Dressing Status Intact 24 1111                VAS Lower extremity venous insufficiency duplex, bilateral w/ measurements    Result Date: 2024  Narrative:  [facility]: Indications: Chronic venous hypertension (idiopathic) with ulcer of right lower extremity [I87.311]. Patient presents with history of Bilateral lower extremity varicose veins, bilateral non-healing ulcers currently on left ankle.  Patient reports she has pain when walking.  Patient has been wearing therapeutic compression stockings for many years. Operative History: 2022 Right EVLT Right  EVLT Risk Factors The patient has history of Obesity, HTN and DVT.  FINDINGS:  Right           Impression  AP (mm)  Reflux  Valve Closure Time  GSV Inguinal                   12.2  Reflux                1.09  GSV Prox Thigh                  6.9  Reflux                3.20  CFV                                  Reflux                1.43  GSV Mid Thigh   EVLT                                             GSV Dist Thigh                  7.4  Reflux                2.86  GSV Knee                        7.8  Reflux                2.47  GSV Prox Calf                   3.4  Reflux                1.96  GSV Mid Calf                    2.7  Reflux                1.15  GSV Dist Calf                   2.8  Reflux                1.56  GSV Ankle                       2.4  Reflux                1.38  SSV Knee                        4.0  Reflux                0.90  SSV Mid Calf                    2.9  Reflux                1.70  SSV Ankle                       3.7  Reflux                1.76   Left            AP (mm)  Reflux  Valve Closure Time  GSV Inguinal       11.1  Reflux                1.52  GSV Prox Thigh      9.2  Reflux                2.70  CFV                      Reflux                1.66  GSV Mid Thigh       8.4  Reflux                2.65  GSV Dist Thigh      4.5  Reflux                7.07  GSV Knee            7.2  Reflux                3.85  GSV Prox Calf       5.8  Reflux                3.91  GSV Mid Calf        6.0  Reflux                2.16  GSV Dist Calf       2.0  Reflux                0.60  GSV Ankle           1.6                                 CONCLUSION: Impression: RIGHT LIMB: Deep venous incompetence is noted in the common femoral vein. The great saphenous vein is incompetent from proximal thigh throughout ankle, not visualized in mid thigh post EVLT. The great saphenous vein does not remain within the saphenous compartment in the thigh. The small saphenous vein is incompetent and communicates with the  "popliteal vein. Multiple varicosities are noted at the of the level of the mid calf to ankle. There is no evidence of deep vein thrombosis in the CFV, the proximal PFV, the femoral vein and the popliteal vein.  LEFT LIMB: Deep venous incompetence is noted in the common femoral vein. The great saphenous vein is incompetent from proximal thigh to ankle. The great saphenous vein does not remain within the saphenous compartment in the thigh. The small saphenous vein is incompetent and communicates with the popliteal vein. Multiple varicosities are noted at the of the level of the mid calf to ankle. There is no evidence of deep vein thrombosis in the CFV, the proximal PFV, the femoral vein and the popliteal vein.  Study performed with patient in steep Reverse Trendelenburg.  SIGNATURE: Electronically Signed by: SAMANTHA HADLEY MD, RPVI on 2024-04-09 11:37:57 AM      Wound Instructions:  Orders Placed This Encounter   Procedures    Wound cleansing and dressings Venous Ulcer Left;Medial Leg     Unna Boot/ Multi-layer compression wrap Instructions    Keep compression wrap/wraps clean and dry. If wraps are too tight and you experience numbness/tingling, call the wound center. If after hours, remove wraps or proceed to nearest E.R. and call wound center in AM.    Wrap will be changed 1 x weekly    Avoid prolonged standing in one place.    Elevate leg(s) above the level of the heart when sitting or as much as possible.     Standing Status:   Future     Standing Expiration Date:   4/30/2024    Wound Procedure Treatment Venous Ulcer Left;Medial Leg     This order was created via procedure documentation         Freida Carlin DPM, KELVIN, FACFAS    Portions of the record may have been created with voice recognition software. Occasional wrong word or \"sound a like\" substitutions may have occurred due to the inherent limitations of voice recognition software. Read the chart carefully and recognize, using context, where substitutions " have occurred.

## 2024-04-26 ENCOUNTER — TELEPHONE (OUTPATIENT)
Dept: WOUND CARE | Facility: HOSPITAL | Age: 64
End: 2024-04-26

## 2024-04-26 NOTE — TELEPHONE ENCOUNTER
"Call from patient's daughter Alma stating that she feels the unna boot was not applied properly on Tuesday. States that the \"white can be seen through the brown\" and there are creases. I offered pt to come to wound center now. Patient declined due to $80 copay. Alma stated that she is a healthcare worker, I asked her in what capacity and she stated she was a Physical Therapist. I asked if she was comfortable unwrapping the unna boot she said yes but then deferred to the patient who then got on the phone and said that she would allow her daughter to unroll the coban and zinc paste, leaving the dressing intact over the wound, applying rolled gauze to keep it intact and then a compression sleeve. Jessi asked if she could come earlier on Tuesday and I advised she could come at 0815. Jessi was agreeable and the call was ended.   "

## 2024-04-30 ENCOUNTER — OFFICE VISIT (OUTPATIENT)
Dept: WOUND CARE | Facility: HOSPITAL | Age: 64
End: 2024-04-30
Payer: COMMERCIAL

## 2024-04-30 VITALS
TEMPERATURE: 97.3 F | HEART RATE: 70 BPM | DIASTOLIC BLOOD PRESSURE: 80 MMHG | RESPIRATION RATE: 18 BRPM | SYSTOLIC BLOOD PRESSURE: 166 MMHG

## 2024-04-30 DIAGNOSIS — L97.929 IDIOPATHIC CHRONIC VENOUS HYPERTENSION OF LEFT LOWER EXTREMITY WITH ULCER (HCC): Primary | ICD-10-CM

## 2024-04-30 DIAGNOSIS — L03.115 CELLULITIS OF RIGHT LOWER LEG: ICD-10-CM

## 2024-04-30 DIAGNOSIS — I87.312 IDIOPATHIC CHRONIC VENOUS HYPERTENSION OF LEFT LOWER EXTREMITY WITH ULCER (HCC): Primary | ICD-10-CM

## 2024-04-30 PROCEDURE — 11042 DBRDMT SUBQ TIS 1ST 20SQCM/<: CPT | Performed by: PODIATRIST

## 2024-04-30 PROCEDURE — 99214 OFFICE O/P EST MOD 30 MIN: CPT | Performed by: PODIATRIST

## 2024-04-30 RX ORDER — LIDOCAINE 40 MG/G
CREAM TOPICAL ONCE
Status: COMPLETED | OUTPATIENT
Start: 2024-04-30 | End: 2024-04-30

## 2024-04-30 RX ORDER — SULFAMETHOXAZOLE AND TRIMETHOPRIM 800; 160 MG/1; MG/1
1 TABLET ORAL EVERY 12 HOURS SCHEDULED
Qty: 14 TABLET | Refills: 0 | Status: SHIPPED | OUTPATIENT
Start: 2024-04-30 | End: 2024-05-07

## 2024-04-30 RX ADMIN — LIDOCAINE: 40 CREAM TOPICAL at 08:24

## 2024-04-30 NOTE — PATIENT INSTRUCTIONS
Orders Placed This Encounter   Procedures    Wound Procedure Treatment Venous Ulcer Left;Medial Leg     This order was created via procedure documentation    Wound cleansing and dressings     Multi-layer compression wrap Instructions:  left leg     Keep compression wrap/wraps clean and dry.     If wraps are too tight and you experience numbness/tingling, call the wound center. If after hours, remove wraps or proceed to nearest E.R. and call wound center in AM.     Wrap will be changed 1 x weekly at [facility].       Avoid prolonged standing in one place.     Elevate leg(s) above the level of the heart when sitting or as much as possible.      Dr. Carlin has ordered an antibiotic for the redness in the right leg.  Please obtain ASAP and take as ordered.    Should redness or pain increase in the area, please call your primary CG provider or go to ER immediately.     Standing Status:   Future     Standing Expiration Date:   5/7/2024

## 2024-04-30 NOTE — PROGRESS NOTES
Patient ID: Jessi Vann is a 63 y.o. female Date of Birth 1960       Chief Complaint   Patient presents with    Follow Up Wound Care Visit     LLE wound       Allergies:  Wound dressing adhesive    Diagnosis:  1. Idiopathic chronic venous hypertension of left lower extremity with ulcer (HCC)  -     lidocaine (LMX) 4 % cream  -     Wound Procedure Treatment Venous Ulcer Left;Medial Leg  -     Wound cleansing and dressings; Future; Expected date: 04/30/2024  -     Debridement Venous Ulcer Left;Medial Leg    2. Cellulitis of right lower leg  -     sulfamethoxazole-trimethoprim (BACTRIM DS) 800-160 mg per tablet; Take 1 tablet by mouth every 12 (twelve) hours for 7 days       Diagnosis ICD-10-CM Associated Orders   1. Idiopathic chronic venous hypertension of left lower extremity with ulcer (HCC)  I87.312 lidocaine (LMX) 4 % cream    L97.929 Wound Procedure Treatment Venous Ulcer Left;Medial Leg     Wound cleansing and dressings     Debridement Venous Ulcer Left;Medial Leg      2. Cellulitis of right lower leg  L03.115 sulfamethoxazole-trimethoprim (BACTRIM DS) 800-160 mg per tablet           Assessment & Plan:  Chronic venous stasis ulceration medial left lower leg, wound was debrided through subcuticular tissues, dressed with Adaptic, dry dressing, Unna boot cover with Coflex.  She is to leave the dressing dry clean and intact, do not get leg wet in shower, she may use a cast cover.  Patient with recurrent cellulitis right posterior lower leg, patient was empirically started on Bactrim DS twice a day for 7 days, she was placed on Keflex 500 mg every 6 hours for 7 days on/9/24, she has a history of MSSA from a culture from 2021, I have switched her to Bactrim to empirically cover for MRSA.  She is advised if the redness, warmth, burning pain increases while she is on antibiotic to go to ED, urgent care, PCP.  Frequent elevation, low-sodium diet, proper protein intake was reviewed with patient, she understands and  "agrees with the plan and will follow-up in 1 week.    Debridement   Wound 03/26/24 Venous Ulcer Leg Left;Medial    Universal Protocol:  Consent: Verbal consent obtained.  Risks and benefits: risks, benefits and alternatives were discussed  Consent given by: patient  Time out: Immediately prior to procedure a \"time out\" was called to verify the correct patient, procedure, equipment, support staff and site/side marked as required.  Patient understanding: patient states understanding of the procedure being performed  Patient identity confirmed: verbally with patient    Debridement Details  Performed by: physician  Debridement type: surgical  Level of debridement: subcutaneous tissue  Pain control: lidocaine 4%      Post-debridement measurements  Length (cm): 0.3  Width (cm): 0.4  Depth (cm): 0.2  Percent debrided: 100%  Surface Area (cm^2): 0.12  Area Debrided (cm^2): 0.12  Volume (cm^3): 0.02    Tissue and other material debrided: subcutaneous tissue  Devitalized tissue debrided: biofilm, fibrin, necrotic debris and slough  Instrument(s) utilized: blade  Bleeding: small  Hemostasis obtained with: pressure  Procedural pain (0-10): insensate  Post-procedural pain: insensate   Response to treatment: procedure was tolerated well         Subjective:   Jessi presents today with her daughter for care of venous stasis ulceration left leg, they had called wound care on Friday as they were concerned about the way the boot was wrapped and daughter was concerned of a kink in the wrapping, they were instructed to remove the Unna boot, leave primary dressing intact and resume use of compression stockings, patient does have 20 to 30 mmHg knee-high compression stockings.  She does state the Unna boot does give her more compression than the stockings.  She also has a concern of right posterior leg with redness and warmth which is a reoccurrence from earlier this month when she was placed on Keflex when she was seen by vascular " surgery.          The following portions of the patient's history were reviewed and updated as appropriate:   Patient Active Problem List   Diagnosis    Cellulitis of right anterior lower leg    Primary hypertension    Venous ulcer of left leg (HCC)    Chronic venous hypertension (idiopathic) with ulcer of right lower extremity (HCC)    COVID-19    Elevated liver enzymes    Hypokalemia    Diarrhea    Thrombophlebitis    UTI symptoms     Past Medical History:   Diagnosis Date    Blood clot in vein 1999    RLE, post partum    COVID-19 12/2021    Hypertension     Ulcer of right shin (HCC)      Past Surgical History:   Procedure Laterality Date    FRACTURE SURGERY Right 1984    with hardware    MT ENDOVEN ABLTJ INCMPTNT VEIN XTR LASER 1ST VEIN Right 6/24/2022    Procedure: ENDOVASCULAR LASER THERAPY (EVLT) right leg with multiple stab phlebectomies;  Surgeon: Sohail Baer MD;  Location:  MAIN OR;  Service: Vascular    MT STAB PHLEBT VARICOSE VEINS 1 XTR > 20 INCS Right 6/24/2022    Procedure: MULTIPLE STAB PHLEBECTOMIES, 10 stabs;  Surgeon: Sohail Baer MD;  Location:  MAIN OR;  Service: Vascular     Social History     Socioeconomic History    Marital status: /Civil Union     Spouse name: None    Number of children: None    Years of education: None    Highest education level: None   Occupational History    None   Tobacco Use    Smoking status: Never    Smokeless tobacco: Never   Vaping Use    Vaping status: Never Used   Substance and Sexual Activity    Alcohol use: Not Currently    Drug use: Never    Sexual activity: Not Currently   Other Topics Concern    None   Social History Narrative    None     Social Determinants of Health     Financial Resource Strain: Not on file   Food Insecurity: No Food Insecurity (1/12/2022)    Hunger Vital Sign     Worried About Running Out of Food in the Last Year: Never true     Ran Out of Food in the Last Year: Never true   Transportation Needs: Unknown (1/12/2022)     PRAPARE - Transportation     Lack of Transportation (Medical): No     Lack of Transportation (Non-Medical): Not on file   Physical Activity: Not on file   Stress: Not on file   Social Connections: Not on file   Intimate Partner Violence: Not on file   Housing Stability: Unknown (1/12/2022)    Housing Stability Vital Sign     Unable to Pay for Housing in the Last Year: No     Number of Places Lived in the Last Year: Not on file     Unstable Housing in the Last Year: No        Current Outpatient Medications:     sulfamethoxazole-trimethoprim (BACTRIM DS) 800-160 mg per tablet, Take 1 tablet by mouth every 12 (twelve) hours for 7 days, Disp: 14 tablet, Rfl: 0    Acetaminophen 325 MG CAPS, Take 3 capsules (975 mg total) by mouth every 8 (eight) hours as needed (mild pain), Disp: 30 capsule, Rfl: 0    betamethasone dipropionate (DIPROSONE) 0.05 % cream, Apply topically as needed (for redness) (Patient not taking: Reported on 8/1/2022), Disp: 30 g, Rfl: 0    fluocinolone (SYNALAR) 0.01 % external solution, Apply topically 2 (two) times a day Apply to area of inflammation twice daily as needed for flareups/burning.  Do not use for more than 7 days at a time.  Not for daily use, Disp: 60 mL, Rfl: 1    traMADol (ULTRAM) 50 mg tablet, Take 1 tablet (50 mg total) by mouth every 6 (six) hours as needed for moderate pain (Patient not taking: Reported on 8/1/2022), Disp: 30 tablet, Rfl: 0  No current facility-administered medications for this visit.  Family History   Problem Relation Age of Onset    No Known Problems Mother     No Known Problems Father     No Known Problems Sister     No Known Problems Brother     No Known Problems Daughter     No Known Problems Son     No Known Problems Maternal Aunt     No Known Problems Maternal Uncle     No Known Problems Paternal Aunt     No Known Problems Paternal Uncle     No Known Problems Maternal Grandmother     No Known Problems Maternal Grandfather     No Known Problems Paternal  Grandmother     No Known Problems Paternal Grandfather     No Known Problems Cousin        Review of Systems   Constitutional:  Negative for chills and fever.   HENT:  Negative for ear pain and sore throat.    Eyes:  Negative for pain and visual disturbance.   Respiratory:  Negative for cough and shortness of breath.    Cardiovascular:  Negative for chest pain and palpitations.   Gastrointestinal:  Negative for abdominal pain and vomiting.   Genitourinary:  Negative for dysuria and hematuria.   Musculoskeletal:  Positive for gait problem. Negative for arthralgias and back pain.   Skin:  Positive for color change and wound. Negative for rash.   Neurological:  Positive for numbness. Negative for seizures and syncope.   Psychiatric/Behavioral: Negative.     All other systems reviewed and are negative.        Objective:  /80   Pulse 70   Temp (!) 97.3 °F (36.3 °C)   Resp 18     Physical Exam  Constitutional:       Appearance: Normal appearance. She is well-developed. She is obese.   HENT:      Head: Normocephalic and atraumatic.      Nose: Nose normal.      Mouth/Throat:      Mouth: Mucous membranes are moist.      Pharynx: Oropharynx is clear.   Eyes:      Conjunctiva/sclera: Conjunctivae normal.      Pupils: Pupils are equal, round, and reactive to light.   Cardiovascular:      Pulses:           Dorsalis pedis pulses are 2+ on the right side and 2+ on the left side.        Posterior tibial pulses are 2+ on the right side and 2+ on the left side.   Pulmonary:      Effort: Pulmonary effort is normal.   Musculoskeletal:         General: Normal range of motion.      Cervical back: Normal range of motion.      Right lower leg: No edema.      Left lower leg: No edema.   Skin:     General: Skin is warm and dry.      Capillary Refill: Capillary refill takes less than 2 seconds.   Neurological:      General: No focal deficit present.      Mental Status: She is alert and oriented to person, place, and time. Mental  status is at baseline.   Psychiatric:         Mood and Affect: Mood normal.         Behavior: Behavior normal.         Thought Content: Thought content normal.         Judgment: Judgment normal.           Wound 03/26/24 Venous Ulcer Leg Left;Medial (Active)   Wound Image Images linked 04/30/24 0842   Wound Description Pink;Yellow 04/30/24 0822   Angelika-wound Assessment Intact;Pink;Hyperpigmented 04/30/24 0822   Wound Length (cm) 0.3 cm 04/30/24 0822   Wound Width (cm) 0.3 cm 04/30/24 0822   Wound Depth (cm) 0.1 cm 04/30/24 0822   Wound Surface Area (cm^2) 0.09 cm^2 04/30/24 0822   Wound Volume (cm^3) 0.009 cm^3 04/30/24 0822   Calculated Wound Volume (cm^3) 0.01 cm^3 04/30/24 0822   Change in Wound Size % 66.67 04/30/24 0822   Drainage Amount Small 04/30/24 0822   Drainage Description Serosanguineous 04/30/24 0822   Non-staged Wound Description Full thickness 04/30/24 0822                VAS Lower extremity venous insufficiency duplex, bilateral w/ measurements    Result Date: 4/9/2024  Narrative:  [facility]PORT [facility]: Indications: Chronic venous hypertension (idiopathic) with ulcer of right lower extremity [I87.311]. Patient presents with history of Bilateral lower extremity varicose veins, bilateral non-healing ulcers currently on left ankle.  Patient reports she has pain when walking.  Patient has been wearing therapeutic compression stockings for many years. Operative History: 2022-06-24 Right EVLT Right EVLT Risk Factors The patient has history of Obesity, HTN and DVT.  FINDINGS:  Right           Impression  AP (mm)  Reflux  Valve Closure Time  GSV Inguinal                   12.2  Reflux                1.09  GSV Prox Thigh                  6.9  Reflux                3.20  CFV                                  Reflux                1.43  GSV Mid Thigh   EVLT                                             GSV Dist Thigh                  7.4  Reflux                2.86  GSV Knee                         7.8  Reflux                2.47  GSV Prox Calf                   3.4  Reflux                1.96  GSV Mid Calf                    2.7  Reflux                1.15  GSV Dist Calf                   2.8  Reflux                1.56  GSV Ankle                       2.4  Reflux                1.38  SSV Knee                        4.0  Reflux                0.90  SSV Mid Calf                    2.9  Reflux                1.70  SSV Ankle                       3.7  Reflux                1.76   Left            AP (mm)  Reflux  Valve Closure Time  GSV Inguinal       11.1  Reflux                1.52  GSV Prox Thigh      9.2  Reflux                2.70  CFV                      Reflux                1.66  GSV Mid Thigh       8.4  Reflux                2.65  GSV Dist Thigh      4.5  Reflux                7.07  GSV Knee            7.2  Reflux                3.85  GSV Prox Calf       5.8  Reflux                3.91  GSV Mid Calf        6.0  Reflux                2.16  GSV Dist Calf       2.0  Reflux                0.60  GSV Ankle           1.6                                 CONCLUSION: Impression: RIGHT LIMB: Deep venous incompetence is noted in the common femoral vein. The great saphenous vein is incompetent from proximal thigh throughout ankle, not visualized in mid thigh post EVLT. The great saphenous vein does not remain within the saphenous compartment in the thigh. The small saphenous vein is incompetent and communicates with the popliteal vein. Multiple varicosities are noted at the of the level of the mid calf to ankle. There is no evidence of deep vein thrombosis in the CFV, the proximal PFV, the femoral vein and the popliteal vein.  LEFT LIMB: Deep venous incompetence is noted in the common femoral vein. The great saphenous vein is incompetent from proximal thigh to ankle. The great saphenous vein does not remain within the saphenous compartment in the thigh. The small saphenous vein is incompetent and communicates with  "the popliteal vein. Multiple varicosities are noted at the of the level of the mid calf to ankle. There is no evidence of deep vein thrombosis in the CFV, the proximal PFV, the femoral vein and the popliteal vein.  Study performed with patient in steep Reverse Trendelenburg.  SIGNATURE: Electronically Signed by: SAMANTHA HADLEY MD, RPVI on 2024-04-09 11:37:57 AM      Wound Instructions:  Orders Placed This Encounter   Procedures    Wound Procedure Treatment Venous Ulcer Left;Medial Leg     This order was created via procedure documentation    Wound cleansing and dressings     Multi-layer compression wrap Instructions:  left leg     Keep compression wrap/wraps clean and dry.     If wraps are too tight and you experience numbness/tingling, call the wound center. If after hours, remove wraps or proceed to nearest E.R. and call wound center in AM.     Wrap will be changed 1 x weekly at [facility].       Avoid prolonged standing in one place.     Elevate leg(s) above the level of the heart when sitting or as much as possible.      Dr. Carlin has ordered an antibiotic for the redness in the right leg.  Please obtain ASAP and take as ordered.    Should redness or pain increase in the area, please call your primary CG provider or go to ER immediately.     Standing Status:   Future     Standing Expiration Date:   5/7/2024    Debridement Venous Ulcer Left;Medial Leg     This order was created via procedure documentation         Freida Carlin, KELVIN, DPM, FACFAS    Portions of the record may have been created with voice recognition software. Occasional wrong word or \"sound a like\" substitutions may have occurred due to the inherent limitations of voice recognition software. Read the chart carefully and recognize, using context, where substitutions have occurred.      "

## 2024-04-30 NOTE — PROGRESS NOTES
Wound Procedure Treatment Venous Ulcer Left;Medial Leg    Performed by: Elva Booth RN  Authorized by: Freida Carlin DPM  Associated wounds:   Wound 03/26/24 Venous Ulcer Leg Left;Medial  Wound cleansed with:  NSS  Applied primary dressing:  Non adherent contact layer  Applied secondary dressing:  Gauze    Compression Pre-Procedure Details  Sensation:  Normal  Skin color:  WNL    Procedure Details  Laterality:  Left  Location:  Leg  Cast type:  Multi-layer compression short leg  Supplies:  2 layer wrap  Post Procedure Details  Pain:  Unchanged  Sensation:  Normal  Patient tolerance of procedure:  Tolerated well, no immediate complications

## 2024-05-07 ENCOUNTER — OFFICE VISIT (OUTPATIENT)
Dept: WOUND CARE | Facility: HOSPITAL | Age: 64
End: 2024-05-07
Payer: COMMERCIAL

## 2024-05-07 VITALS
DIASTOLIC BLOOD PRESSURE: 77 MMHG | SYSTOLIC BLOOD PRESSURE: 171 MMHG | RESPIRATION RATE: 16 BRPM | HEART RATE: 67 BPM | TEMPERATURE: 97.1 F

## 2024-05-07 DIAGNOSIS — L97.929 IDIOPATHIC CHRONIC VENOUS HYPERTENSION OF LEFT LOWER EXTREMITY WITH ULCER (HCC): Primary | ICD-10-CM

## 2024-05-07 DIAGNOSIS — I87.312 IDIOPATHIC CHRONIC VENOUS HYPERTENSION OF LEFT LOWER EXTREMITY WITH ULCER (HCC): Primary | ICD-10-CM

## 2024-05-07 PROCEDURE — 11042 DBRDMT SUBQ TIS 1ST 20SQCM/<: CPT | Performed by: PODIATRIST

## 2024-05-07 RX ORDER — LIDOCAINE 40 MG/G
CREAM TOPICAL ONCE
Status: COMPLETED | OUTPATIENT
Start: 2024-05-07 | End: 2024-05-07

## 2024-05-07 RX ADMIN — LIDOCAINE: 40 CREAM TOPICAL at 11:32

## 2024-05-07 NOTE — PROGRESS NOTES
"Patient ID: Jessi Vann is a 63 y.o. female Date of Birth 1960       Chief Complaint   Patient presents with    Follow Up Wound Care Visit     LLE wound       Allergies:  Wound dressing adhesive    Diagnosis:  1. Idiopathic chronic venous hypertension of left lower extremity with ulcer (HCC)  -     lidocaine (LMX) 4 % cream  -     Wound cleansing and dressings Venous Ulcer Left;Medial Leg; Future  -     Wound Procedure Treatment Venous Ulcer Left;Medial Leg       Diagnosis ICD-10-CM Associated Orders   1. Idiopathic chronic venous hypertension of left lower extremity with ulcer (HCC)  I87.312 lidocaine (LMX) 4 % cream    L97.929 Wound cleansing and dressings Venous Ulcer Left;Medial Leg     Wound Procedure Treatment Venous Ulcer Left;Medial Leg           Assessment & Plan:  Chronic venous stasis ulceration medial left lower leg, wound was debrided through subcuticular tissues and dressed with Adaptic, dry dressing, Unna boot with Coflex cover, she is to leave dressing dry clean and intact, do not get leg wet in shower, she may use a cast cover.  Frequent elevation, low-sodium diet, proper glycemic control was reviewed with patient, she understands and agrees with the plan and will follow-up in 1 week.    Debridement   Wound 03/26/24 Venous Ulcer Leg Left;Medial    Universal Protocol:  Consent: Verbal consent obtained.  Risks and benefits: risks, benefits and alternatives were discussed  Consent given by: patient  Time out: Immediately prior to procedure a \"time out\" was called to verify the correct patient, procedure, equipment, support staff and site/side marked as required.  Patient understanding: patient states understanding of the procedure being performed  Patient identity confirmed: verbally with patient    Debridement Details  Performed by: physician  Debridement type: surgical  Level of debridement: subcutaneous tissue  Pain control: lidocaine 4%      Post-debridement measurements  Length (cm): " 0.3  Width (cm): 0.4  Depth (cm): 0.3  Percent debrided: 100%  Surface Area (cm^2): 0.12  Area Debrided (cm^2): 0.12  Volume (cm^3): 0.04    Tissue and other material debrided: subcutaneous tissue  Devitalized tissue debrided: biofilm, fibrin, necrotic debris, slough and eschar  Instrument(s) utilized: blade  Bleeding: small  Hemostasis obtained with: pressure  Procedural pain (0-10): insensate  Post-procedural pain: insensate   Response to treatment: procedure was tolerated well               Subjective:   Jessi presents today for care of left lower leg venous stasis ulceration, she is tolerating her Unna boot well with no new complaints.          The following portions of the patient's history were reviewed and updated as appropriate:   Patient Active Problem List   Diagnosis    Cellulitis of right anterior lower leg    Primary hypertension    Venous ulcer of left leg (HCC)    Chronic venous hypertension (idiopathic) with ulcer of right lower extremity (HCC)    COVID-19    Elevated liver enzymes    Hypokalemia    Diarrhea    Thrombophlebitis    UTI symptoms     Past Medical History:   Diagnosis Date    Blood clot in vein 1999    RLE, post partum    COVID-19 12/2021    Hypertension     Ulcer of right shin (HCC)      Past Surgical History:   Procedure Laterality Date    FRACTURE SURGERY Right 1984    with hardware    AZ ENDOVEN ABLTJ INCMPTNT VEIN XTR LASER 1ST VEIN Right 6/24/2022    Procedure: ENDOVASCULAR LASER THERAPY (EVLT) right leg with multiple stab phlebectomies;  Surgeon: Sohail Baer MD;  Location:  MAIN OR;  Service: Vascular    AZ STAB PHLEBT VARICOSE VEINS 1 XTR > 20 INCS Right 6/24/2022    Procedure: MULTIPLE STAB PHLEBECTOMIES, 10 stabs;  Surgeon: Sohail Baer MD;  Location:  MAIN OR;  Service: Vascular     Social History     Socioeconomic History    Marital status: /Civil Union     Spouse name: None    Number of children: None    Years of education: None    Highest education  level: None   Occupational History    None   Tobacco Use    Smoking status: Never    Smokeless tobacco: Never   Vaping Use    Vaping status: Never Used   Substance and Sexual Activity    Alcohol use: Not Currently    Drug use: Never    Sexual activity: Not Currently   Other Topics Concern    None   Social History Narrative    None     Social Determinants of Health     Financial Resource Strain: Not on file   Food Insecurity: No Food Insecurity (1/12/2022)    Hunger Vital Sign     Worried About Running Out of Food in the Last Year: Never true     Ran Out of Food in the Last Year: Never true   Transportation Needs: Unknown (1/12/2022)    PRAPARE - Transportation     Lack of Transportation (Medical): No     Lack of Transportation (Non-Medical): Not on file   Physical Activity: Not on file   Stress: Not on file   Social Connections: Not on file   Intimate Partner Violence: Not on file   Housing Stability: Unknown (1/12/2022)    Housing Stability Vital Sign     Unable to Pay for Housing in the Last Year: No     Number of Places Lived in the Last Year: Not on file     Unstable Housing in the Last Year: No        Current Outpatient Medications:     Acetaminophen 325 MG CAPS, Take 3 capsules (975 mg total) by mouth every 8 (eight) hours as needed (mild pain), Disp: 30 capsule, Rfl: 0    betamethasone dipropionate (DIPROSONE) 0.05 % cream, Apply topically as needed (for redness) (Patient not taking: Reported on 8/1/2022), Disp: 30 g, Rfl: 0    fluocinolone (SYNALAR) 0.01 % external solution, Apply topically 2 (two) times a day Apply to area of inflammation twice daily as needed for flareups/burning.  Do not use for more than 7 days at a time.  Not for daily use, Disp: 60 mL, Rfl: 1    sulfamethoxazole-trimethoprim (BACTRIM DS) 800-160 mg per tablet, Take 1 tablet by mouth every 12 (twelve) hours for 7 days, Disp: 14 tablet, Rfl: 0    traMADol (ULTRAM) 50 mg tablet, Take 1 tablet (50 mg total) by mouth every 6 (six) hours as  needed for moderate pain (Patient not taking: Reported on 8/1/2022), Disp: 30 tablet, Rfl: 0  No current facility-administered medications for this visit.  Family History   Problem Relation Age of Onset    No Known Problems Mother     No Known Problems Father     No Known Problems Sister     No Known Problems Brother     No Known Problems Daughter     No Known Problems Son     No Known Problems Maternal Aunt     No Known Problems Maternal Uncle     No Known Problems Paternal Aunt     No Known Problems Paternal Uncle     No Known Problems Maternal Grandmother     No Known Problems Maternal Grandfather     No Known Problems Paternal Grandmother     No Known Problems Paternal Grandfather     No Known Problems Cousin        Review of Systems   Constitutional:  Negative for chills and fever.   HENT:  Negative for ear pain and sore throat.    Eyes:  Negative for pain and visual disturbance.   Respiratory:  Negative for cough and shortness of breath.    Cardiovascular:  Negative for chest pain and palpitations.   Gastrointestinal:  Negative for abdominal pain and vomiting.   Genitourinary:  Negative for dysuria and hematuria.   Musculoskeletal:  Positive for gait problem. Negative for arthralgias and back pain.   Skin:  Positive for color change and wound. Negative for rash.   Neurological:  Negative for seizures and syncope.   Psychiatric/Behavioral: Negative.     All other systems reviewed and are negative.        Objective:  BP (!) 171/77   Pulse 67   Temp (!) 97.1 °F (36.2 °C)   Resp 16     Physical Exam  Constitutional:       Appearance: Normal appearance. She is well-developed. She is obese.   HENT:      Head: Normocephalic and atraumatic.      Nose: Nose normal.      Mouth/Throat:      Mouth: Mucous membranes are moist.   Eyes:      Conjunctiva/sclera: Conjunctivae normal.      Pupils: Pupils are equal, round, and reactive to light.   Cardiovascular:      Pulses:           Dorsalis pedis pulses are 2+ on the  right side and 2+ on the left side.        Posterior tibial pulses are 2+ on the right side and 2+ on the left side.   Pulmonary:      Effort: Pulmonary effort is normal.   Musculoskeletal:         General: Normal range of motion.      Cervical back: Normal range of motion.      Right lower le+ Pitting Edema present.      Left lower le+ Pitting Edema present.   Skin:     General: Skin is warm and dry.      Capillary Refill: Capillary refill takes less than 2 seconds.   Neurological:      General: No focal deficit present.      Mental Status: She is alert and oriented to person, place, and time. Mental status is at baseline.   Psychiatric:         Mood and Affect: Mood normal.         Behavior: Behavior normal.         Thought Content: Thought content normal.         Judgment: Judgment normal.           Wound 24 Venous Ulcer Leg Left;Medial (Active)   Wound Image Images linked 24 1143   Wound Description [place] 24 1100   Angelika-wound Assessment Intact;Hyperpigmented;Pink 24 1100   Wound Length (cm) 0.2 cm 24 1100   Wound Width (cm) 0.4 cm 24 1100   Wound Depth (cm) 0.1 cm 24 1100   Wound Surface Area (cm^2) 0.08 cm^2 24 1100   Wound Volume (cm^3) 0.008 cm^3 24 1100   Calculated Wound Volume (cm^3) 0.01 cm^3 24 1100   Change in Wound Size % 66.67 24 1100   Drainage Amount Scant 24 1100   Drainage Description Serous 24 1100   Non-staged Wound Description Full thickness 24 1100   Dressing Status Intact 24 1100                VAS Lower extremity venous insufficiency duplex, bilateral w/ measurements    Result Date: 2024  Narrative:  [facility]PORT [facility]INICAL: Indications: Chronic venous hypertension (idiopathic) with ulcer of right lower extremity [I87.311]. Patient presents with history of Bilateral lower extremity varicose veins, bilateral non-healing ulcers currently on left ankle.  Patient reports she has pain  when walking.  Patient has been wearing therapeutic compression stockings for many years. Operative History: 2022-06-24 Right EVLT Right EVLT Risk Factors The patient has history of Obesity, HTN and DVT.  FINDINGS:  Right           Impression  AP (mm)  Reflux  Valve Closure Time  GSV Inguinal                   12.2  Reflux                1.09  GSV Prox Thigh                  6.9  Reflux                3.20  CFV                                  Reflux                1.43  GSV Mid Thigh   EVLT                                             GSV Dist Thigh                  7.4  Reflux                2.86  GSV Knee                        7.8  Reflux                2.47  GSV Prox Calf                   3.4  Reflux                1.96  GSV Mid Calf                    2.7  Reflux                1.15  GSV Dist Calf                   2.8  Reflux                1.56  GSV Ankle                       2.4  Reflux                1.38  SSV Knee                        4.0  Reflux                0.90  SSV Mid Calf                    2.9  Reflux                1.70  SSV Ankle                       3.7  Reflux                1.76   Left            AP (mm)  Reflux  Valve Closure Time  GSV Inguinal       11.1  Reflux                1.52  GSV Prox Thigh      9.2  Reflux                2.70  CFV                      Reflux                1.66  GSV Mid Thigh       8.4  Reflux                2.65  GSV Dist Thigh      4.5  Reflux                7.07  GSV Knee            7.2  Reflux                3.85  GSV Prox Calf       5.8  Reflux                3.91  GSV Mid Calf        6.0  Reflux                2.16  GSV Dist Calf       2.0  Reflux                0.60  GSV Ankle           1.6                                 CONCLUSION: Impression: RIGHT LIMB: Deep venous incompetence is noted in the common femoral vein. The great saphenous vein is incompetent from proximal thigh throughout ankle, not visualized in mid thigh post EVLT. The great saphenous  vein does not remain within the saphenous compartment in the thigh. The small saphenous vein is incompetent and communicates with the popliteal vein. Multiple varicosities are noted at the of the level of the mid calf to ankle. There is no evidence of deep vein thrombosis in the CFV, the proximal PFV, the femoral vein and the popliteal vein.  LEFT LIMB: Deep venous incompetence is noted in the common femoral vein. The great saphenous vein is incompetent from proximal thigh to ankle. The great saphenous vein does not remain within the saphenous compartment in the thigh. The small saphenous vein is incompetent and communicates with the popliteal vein. Multiple varicosities are noted at the of the level of the mid calf to ankle. There is no evidence of deep vein thrombosis in the CFV, the proximal PFV, the femoral vein and the popliteal vein.  Study performed with patient in steep Reverse Trendelenburg.  SIGNATURE: Electronically Signed by: SAMANTHA HADLEY MD, RPVI on 2024-04-09 11:37:57 AM      Wound Instructions:  Orders Placed This Encounter   Procedures    Wound cleansing and dressings Venous Ulcer Left;Medial Leg     Multi-layer compression wrap Instructions:  left leg     Keep compression wrap/wraps clean and dry.      If wraps are too tight and you experience numbness/tingling, call the wound center. If after hours, remove wraps or proceed to nearest E.R. and call wound center in AM.     Wrap will be [facility] 1 x weekly at [facility].       Avoid prolonged standing in one place.     Elevate leg(s) above the level of the heart when sitting or as much as possible.    May shower with cast cover.     Standing Status:   Future     Standing Expiration Date:   5/14/2024    Wound Procedure Treatment Venous Ulcer Left;Medial Leg     This order was created via procedure documentation         Freida Carlin DPM, KELVIN, FACFAS    Portions of the record may have been created with voice recognition software. Occasional wrong word or  "\"sound a like\" substitutions may have occurred due to the inherent limitations of voice recognition software. Read the chart carefully and recognize, using context, where substitutions have occurred.      "

## 2024-05-07 NOTE — PROGRESS NOTES
Wound Procedure Treatment Venous Ulcer Left;Medial Leg    Performed by: Elva Booth RN  Authorized by: Freida Carlin DPM  Associated wounds:   Wound 03/26/24 Venous Ulcer Leg Left;Medial  Wound cleansed with:  Soap and water  Applied primary dressing:  Non adherent contact layer  Applied secondary dressing:  Gauze    Universal Protocol:     Verbal consent obtained?: Yes      Risks and benefits: Risks, benefits and alternatives were discussed      Patient states understanding of procedure being performed: Yes      Patient identity confirmed:  Verbally with patient    Compression Pre-Procedure Details  Sensation:  Normal  Skin color:  WNL    Procedure Details  Laterality:  Left  Location:  Leg  Cast type:  Multi-layer compression short leg  Supplies:  2 layer wrap  Post Procedure Details  Pain:  Improved  Sensation:  Normal  Skin color:  WNL  Patient tolerance of procedure:  Tolerated well, no immediate complications    Unna Boot to left leg.      Small silicone bordered gauze applied to anterior ankle to protect.

## 2024-05-07 NOTE — PATIENT INSTRUCTIONS
Orders Placed This Encounter   Procedures    Wound cleansing and dressings Venous Ulcer Left;Medial Leg     Multi-layer compression wrap Instructions:  left leg     Keep compression wrap/wraps clean and dry.      If wraps are too tight and you experience numbness/tingling, call the wound center. If after hours, remove wraps or proceed to nearest E.R. and call wound center in AM.     Wrap will be changed 1 x weekly at [facility].       Avoid prolonged standing in one place.     Elevate leg(s) above the level of the heart when sitting or as much as possible.    May shower with cast cover.     Standing Status:   Future     Standing Expiration Date:   5/14/2024    Wound Procedure Treatment Venous Ulcer Left;Medial Leg     This order was created via procedure documentation

## 2024-05-14 ENCOUNTER — OFFICE VISIT (OUTPATIENT)
Dept: WOUND CARE | Facility: HOSPITAL | Age: 64
End: 2024-05-14
Payer: COMMERCIAL

## 2024-05-14 ENCOUNTER — TELEPHONE (OUTPATIENT)
Age: 64
End: 2024-05-14

## 2024-05-14 VITALS
TEMPERATURE: 96.9 F | HEART RATE: 71 BPM | DIASTOLIC BLOOD PRESSURE: 80 MMHG | SYSTOLIC BLOOD PRESSURE: 187 MMHG | RESPIRATION RATE: 16 BRPM

## 2024-05-14 DIAGNOSIS — L97.929 IDIOPATHIC CHRONIC VENOUS HYPERTENSION OF LEFT LOWER EXTREMITY WITH ULCER (HCC): Primary | ICD-10-CM

## 2024-05-14 DIAGNOSIS — I87.312 IDIOPATHIC CHRONIC VENOUS HYPERTENSION OF LEFT LOWER EXTREMITY WITH ULCER (HCC): Primary | ICD-10-CM

## 2024-05-14 PROCEDURE — 29580 STRAPPING UNNA BOOT: CPT

## 2024-05-14 PROCEDURE — 99213 OFFICE O/P EST LOW 20 MIN: CPT | Performed by: PODIATRIST

## 2024-05-14 PROCEDURE — 97597 DBRDMT OPN WND 1ST 20 CM/<: CPT | Performed by: PODIATRIST

## 2024-05-14 RX ORDER — LIDOCAINE 40 MG/G
CREAM TOPICAL ONCE
Status: COMPLETED | OUTPATIENT
Start: 2024-05-14 | End: 2024-05-14

## 2024-05-14 RX ADMIN — LIDOCAINE 1 APPLICATION: 40 CREAM TOPICAL at 10:55

## 2024-05-14 NOTE — TELEPHONE ENCOUNTER
Pt's daughter Alma calling in ref to a WC visit they had with WC today, per provider she will need to follow up with a VS, ASAP to discuss surgical management as conservative is failing, L/s DEWAYNE 4/15/24, Pt lives in [place], please call to set up appt with VS. Cx appt with [facility] as they need a VS

## 2024-05-14 NOTE — PATIENT INSTRUCTIONS
Orders Placed This Encounter   Procedures    Wound cleansing and dressings Venous Ulcer Left;Medial Leg     Wound cleansing and dressings Venous Ulcer Left;Medial Leg   Multi-layer compression wrap Instructions:  left leg     Keep compression wrap/wraps clean and dry.      If wraps are too tight and you experience numbness/tingling, call the wound center. If after hours, remove wraps or proceed to nearest E.R. and call wound center in AM.     Wrap will be changed 1 x weekly at [facility].       Avoid prolonged standing in one place.     Elevate leg(s) above the level of the heart when sitting or as much as possible.     May shower with cast cover.    Consult for vascular surgery placed today.     Standing Status:   Future     Standing Expiration Date:   5/21/2024    Ambulatory Referral to Vascular Surgery     Standing Status:   Future     Standing Expiration Date:   5/14/2025     Referral Priority:   Routine     Referral Type:   Consult - AMB     Referral Reason:   Specialty Services Required     Referred to Provider:   Sohail Baer MD     Requested Specialty:   Vascular Surgery     Number of Visits Requested:   1     Expiration Date:   5/14/2025

## 2024-05-14 NOTE — PROGRESS NOTES
Patient ID: Jessi Vann is a 63 y.o. female Date of Birth 1960       Chief Complaint   Patient presents with    Follow Up Wound Care Visit     LEFT LEG WOUND       Allergies:  Wound dressing adhesive    Diagnosis:  1. Idiopathic chronic venous hypertension of left lower extremity with ulcer (HCC)  -     lidocaine (LMX) 4 % cream  -     Wound cleansing and dressings Venous Ulcer Left;Medial Leg; Future  -     Ambulatory Referral to Vascular Surgery; Future       Diagnosis ICD-10-CM Associated Orders   1. Idiopathic chronic venous hypertension of left lower extremity with ulcer (HCC)  I87.312 lidocaine (LMX) 4 % cream    L97.929 Wound cleansing and dressings Venous Ulcer Left;Medial Leg     Ambulatory Referral to Vascular Surgery           Assessment & Plan:  Chronic venous stasis ulceration left anterior lower leg, wound was debrided through selective tissues and dressed with Adaptic dry dressing and Unna boot covered with Coflex, she is to leave this dressing dry clean and intact for 1 week.  She may use a cast cover to shower.  I reviewed patient's lower extremity venous Doppler results from 4/9/2024 and patient's vascular surgery note from 4/15/2024, at this time as her wound continues to be nonhealing despite maximal compression and good local wound care I referred her back to vascular surgery for laser ablation, formal referral was placed and patient is advised to call Dr. Baer's office to get an appointment, I also put a staff message into Dr. Baer to ask his staff to start the ball rolling.  In the meantime we will continue with frequent elevation, low-sodium diet, proper protein intake, patient understands and agrees with the plan and will follow-up in 1 week.    Debridement   Wound 03/26/24 Venous Ulcer Leg Left;Medial    Universal Protocol:  Consent: Verbal consent obtained.  Risks and benefits: risks, benefits and alternatives were discussed  Consent given by: patient  Patient understanding:  patient states understanding of the procedure being performed  Patient identity confirmed: verbally with patient    Debridement Details  Performed by: physician  Debridement type: selective  Pain control: lidocaine 4%      Post-debridement measurements  Length (cm): 0.3  Width (cm): 0.5  Depth (cm): 0.2  Percent debrided: 100%  Surface Area (cm^2): 0.15  Area Debrided (cm^2): 0.15  Volume (cm^3): 0.03    Devitalized tissue debrided: biofilm and slough  Instrument(s) utilized: blade  Bleeding: small  Hemostasis obtained with: pressure  Procedural pain (0-10): insensate  Post-procedural pain: insensate   Response to treatment: procedure was tolerated well         Subjective:   Jessi presents today for care of left lower extremity venous stasis ulceration, she is tolerating her Unna boot well and has no new complaints.          The following portions of the patient's history were reviewed and updated as appropriate:   Patient Active Problem List   Diagnosis    Cellulitis of right anterior lower leg    Primary hypertension    Venous ulcer of left leg (HCC)    Chronic venous hypertension (idiopathic) with ulcer of right lower extremity (HCC)    COVID-19    Elevated liver enzymes    Hypokalemia    Diarrhea    Thrombophlebitis    UTI symptoms     Past Medical History:   Diagnosis Date    Blood clot in vein 1999    RLE, post partum    COVID-19 12/2021    Hypertension     Ulcer of right shin (HCC)      Past Surgical History:   Procedure Laterality Date    FRACTURE SURGERY Right 1984    with hardware    [place] ENDOVEN ABLTJ INCMPTNT VEIN XTR LASER 1ST VEIN Right 6/24/2022    Procedure: ENDOVASCULAR LASER THERAPY (EVLT) right leg with multiple stab phlebectomies;  Surgeon: Sohail Baer MD;  Location:  MAIN OR;  Service: Vascular    [place] STAB PHLEBT VARICOSE VEINS 1 XTR > 20 INCS Right 6/24/2022    Procedure: MULTIPLE STAB PHLEBECTOMIES, 10 stabs;  Surgeon: Sohail Baer MD;  Location:  MAIN OR;  Service: Vascular      Social History     Socioeconomic History    Marital status: /Civil Union     Spouse name: Not on file    Number of children: Not on file    Years of education: Not on file    Highest education level: Not on file   Occupational History    Not on file   Tobacco Use    Smoking status: Never    Smokeless tobacco: Never   Vaping Use    Vaping status: Never Used   Substance and Sexual Activity    Alcohol use: Not Currently    Drug use: Never    Sexual activity: Not Currently   Other Topics Concern    Not on file   Social History Narrative    Not on file     Social Determinants of Health     Financial Resource Strain: Not on file   Food Insecurity: No Food Insecurity (1/12/2022)    Hunger Vital Sign     Worried About Running Out of Food in the Last Year: Never true     Ran Out of Food in the Last Year: Never true   Transportation Needs: Unknown (1/12/2022)    PRAPARE - Transportation     Lack of Transportation (Medical): No     Lack of Transportation (Non-Medical): Not on file   Physical Activity: Not on file   Stress: Not on file   Social Connections: Not on file   Intimate Partner Violence: Not on file   Housing Stability: Unknown (1/12/2022)    Housing Stability Vital Sign     Unable to Pay for Housing in the Last Year: No     Number of Places Lived in the Last Year: Not on file     Unstable Housing in the Last Year: No        Current Outpatient Medications:     Acetaminophen 325 MG CAPS, Take 3 capsules (975 mg total) by mouth every 8 (eight) hours as needed (mild pain), Disp: 30 capsule, Rfl: 0    betamethasone dipropionate (DIPROSONE) 0.05 % cream, Apply topically as needed (for redness) (Patient not taking: Reported on 8/1/2022), Disp: 30 g, Rfl: 0    fluocinolone (SYNALAR) 0.01 % external solution, Apply topically 2 (two) times a day Apply to area of inflammation twice daily as needed for flareups/burning.  Do not use for more than 7 days at a time.  Not for daily use, Disp: 60 mL, Rfl: 1    traMADol  (ULTRAM) 50 mg tablet, Take 1 tablet (50 mg total) by mouth every 6 (six) hours as needed for moderate pain (Patient not taking: Reported on 8/1/2022), Disp: 30 tablet, Rfl: 0  No current facility-administered medications for this visit.  Family History   Problem Relation Age of Onset    No Known Problems Mother     No Known Problems Father     No Known Problems Sister     No Known Problems Brother     No Known Problems Daughter     No Known Problems Son     No Known Problems Maternal Aunt     No Known Problems Maternal Uncle     No Known Problems Paternal Aunt     No Known Problems Paternal Uncle     No Known Problems Maternal Grandmother     No Known Problems Maternal Grandfather     No Known Problems Paternal Grandmother     No Known Problems Paternal Grandfather     No Known Problems Cousin        Review of Systems   Constitutional:  Negative for chills and fever.   HENT:  Negative for ear pain and sore throat.    Eyes:  Negative for pain and visual disturbance.   Respiratory:  Negative for cough and shortness of breath.    Cardiovascular:  Negative for chest pain and palpitations.   Gastrointestinal:  Negative for abdominal pain and vomiting.   Genitourinary:  Negative for dysuria and hematuria.   Musculoskeletal:  Positive for gait problem. Negative for arthralgias and back pain.   Skin:  Positive for color change and wound. Negative for rash.   Neurological:  Positive for numbness. Negative for seizures and syncope.   Psychiatric/Behavioral: Negative.     All other systems reviewed and are negative.        Objective:  BP (!) 187/80   Pulse 71   Temp (!) 96.9 °F (36.1 °C)   Resp 16     Physical Exam  Constitutional:       Appearance: Normal appearance. She is well-developed and normal weight.   HENT:      Head: Normocephalic and atraumatic.      Nose: Nose normal.      Mouth/Throat:      Mouth: Mucous membranes are moist.      Pharynx: Oropharynx is clear.   Eyes:      Conjunctiva/sclera: Conjunctivae  normal.      Pupils: Pupils are equal, round, and reactive to light.   Cardiovascular:      Pulses:           Dorsalis pedis pulses are 2+ on the right side and 2+ on the left side.        Posterior tibial pulses are 2+ on the right side and 2+ on the left side.   Pulmonary:      Effort: Pulmonary effort is normal.   Musculoskeletal:         General: Normal range of motion.      Cervical back: Normal range of motion.      Right lower le+ Pitting Edema present.      Left lower le+ Pitting Edema present.   Skin:     General: Skin is warm and dry.      Capillary Refill: Capillary refill takes less than 2 seconds.   Neurological:      General: No focal deficit present.      Mental Status: She is alert and oriented to person, place, and time. Mental status is at baseline.      Sensory: Sensory deficit present.      Coordination: Coordination abnormal.      Gait: Gait abnormal.      Deep Tendon Reflexes: Reflexes abnormal.   Psychiatric:         Mood and Affect: Mood normal.         Behavior: Behavior normal.         Thought Content: Thought content normal.         Judgment: Judgment normal.           Wound 24 Venous Ulcer Leg Left;Medial (Active)   Wound Image Images linked 24 1050   Wound Description Pink;Yellow 24 1054   Angelika-wound Assessment Intact;Hyperpigmented;Pink 24 1054   Wound Length (cm) 0.3 cm 24 1054   Wound Width (cm) 0.5 cm 24 1054   Wound Depth (cm) 0.2 cm 24 1054   Wound Surface Area (cm^2) 0.15 cm^2 24 1054   Wound Volume (cm^3) 0.03 cm^3 24 1054   Calculated Wound Volume (cm^3) 0.03 cm^3 24 1054   Change in Wound Size % 0 24 1054   Drainage Amount Small 24 1054   Drainage Description Tan;Serosanguineous 24 1054   Non-staged Wound Description Full thickness 24 1054                No results found.    Wound Instructions:  Orders Placed This Encounter   Procedures    Wound cleansing and dressings Venous Ulcer  "Left;Medial Leg     Wound cleansing and dressings Venous Ulcer Left;Medial Leg   Multi-layer compression wrap Instructions:  left leg     Keep compression wrap/wraps clean and dry.      If wraps are too tight and you experience numbness/tingling, call the wound center. If after hours, remove wraps or proceed to nearest E.R. and call wound center in AM.     Wrap will be changed 1 x weekly at [facility].       Avoid prolonged standing in one place.     Elevate leg(s) above the level of the heart when sitting or as much as possible.     May shower with cast cover.    Consult for vascular surgery placed today.     Standing Status:   Future     Standing Expiration Date:   5/21/2024    Ambulatory Referral to Vascular Surgery     Standing Status:   Future     Standing Expiration Date:   5/14/2025     Referral Priority:   Routine     Referral Type:   Consult - AMB     Referral Reason:   Specialty Services Required     Referred to Provider:   Sohail Baer MD     Requested Specialty:   Vascular Surgery     Number of Visits Requested:   1     Expiration Date:   5/14/2025         Freida Carlin DPM, KELVIN, FACFAS    Portions of the record may have been created with voice recognition software. Occasional wrong word or \"sound a like\" substitutions may have occurred due to the inherent limitations of voice recognition software. Read the chart carefully and recognize, using context, where substitutions have occurred.      "

## 2024-05-14 NOTE — PROGRESS NOTES
Cast Application    Date/Time: 5/14/2024 10:30 AM    Performed by: Donna Sams RN  Authorized by: Freida Carlin DPM  Universal Protocol:  Consent: Verbal consent obtained.  Consent given by: patient  Timeout called at: 5/14/2024 11:16 AM.  Patient identity confirmed: verbally with patient    Pre-procedure details:     Sensation:  Normal  Procedure details:     Laterality:  Left    Location:  Leg    Leg:  L lower legCast type:  Unna boot        Supplies:  2 layer wrap  Post-procedure details:     Pain:  Improved    Sensation:  Normal    Patient tolerance of procedure:  Tolerated well, no immediate complications  Comments:      UNNA BOOT wrap procedure     Before application, JOHANA and/or TBI determined to be adequate for healing and application of compression. Lower extremity washed prior to application of compression wrap. With the foot in dorsiflexed position, Unna boot was applied as per physician orders without complications or complaint of pain.  The procedure was tolerated well. Toes warm & pink post application.  Patient provided education & reinforced to observe toes for any discoloration, swelling or tingling and instructed when to report to the Wound Center or to remove compression. Wound care as per providers orders, patient tolerated well.           Wound Procedure Treatment Venous Ulcer Left;Medial Leg    Performed by: Donna Sams RN  Authorized by: Freida Carlin DPM  Associated wounds:   Wound 03/26/24 Venous Ulcer Leg Left;Medial  Wound cleansed with:  Soap and water  Applied primary dressing:  Non adherent contact layer  Applied secondary dressing:  Gauze  Wound secured with:  Tubifast and Compression wrap

## 2024-05-15 NOTE — TELEPHONE ENCOUNTER
Received another call from pt's daughter, Alma, regarding making an appt with a VS for a follow up based on the recommendations from wound care yesterday, 5/14. Alma stated the wound on her left medial malleolus is getting bigger. States there is redness around the wound. Pt just finished a course of abx's this past weekend. Denies fever, chills, colo/temp changes, nor sensory/motor deficits. Called over the the vascular office to see if pt can be set up with a surgeon asap. Was informed to send an inbasket message and someone will be reaching out to Alma to schedule.

## 2024-05-16 NOTE — TELEPHONE ENCOUNTER
Patients daughter called inquiring about a sooner appt , patient still having redness , she was triaged yesterday and a message was sent to the office , please reach out daughter at [phone]

## 2024-05-17 ENCOUNTER — OFFICE VISIT (OUTPATIENT)
Dept: VASCULAR SURGERY | Facility: CLINIC | Age: 64
End: 2024-05-17
Payer: COMMERCIAL

## 2024-05-17 VITALS
HEART RATE: 81 BPM | WEIGHT: 213 LBS | DIASTOLIC BLOOD PRESSURE: 114 MMHG | SYSTOLIC BLOOD PRESSURE: 182 MMHG | OXYGEN SATURATION: 98 % | BODY MASS INDEX: 38.96 KG/M2

## 2024-05-17 DIAGNOSIS — I87.312 IDIOPATHIC CHRONIC VENOUS HYPERTENSION OF LEFT LOWER EXTREMITY WITH ULCER (HCC): ICD-10-CM

## 2024-05-17 DIAGNOSIS — I87.319 CHRONIC VENOUS HYPERTENSION W ULCERATION (HCC): Primary | ICD-10-CM

## 2024-05-17 DIAGNOSIS — L97.929 IDIOPATHIC CHRONIC VENOUS HYPERTENSION OF LEFT LOWER EXTREMITY WITH ULCER (HCC): ICD-10-CM

## 2024-05-17 DIAGNOSIS — L97.909 CHRONIC VENOUS HYPERTENSION W ULCERATION (HCC): Primary | ICD-10-CM

## 2024-05-17 DIAGNOSIS — L97.929 VENOUS ULCER OF LEFT LEG (HCC): ICD-10-CM

## 2024-05-17 DIAGNOSIS — I83.029 VENOUS ULCER OF LEFT LEG (HCC): ICD-10-CM

## 2024-05-17 PROCEDURE — 99214 OFFICE O/P EST MOD 30 MIN: CPT | Performed by: SURGERY

## 2024-05-17 PROCEDURE — 29580 STRAPPING UNNA BOOT: CPT | Performed by: SURGERY

## 2024-05-17 RX ORDER — CEFAZOLIN SODIUM 2 G/50ML
2000 SOLUTION INTRAVENOUS ONCE
OUTPATIENT
Start: 2024-05-17 | End: 2024-05-17

## 2024-05-17 RX ORDER — CHLORHEXIDINE GLUCONATE ORAL RINSE 1.2 MG/ML
15 SOLUTION DENTAL ONCE
OUTPATIENT
Start: 2024-05-17 | End: 2024-05-17

## 2024-05-17 NOTE — PROGRESS NOTES
Ambulatory Visit  Name: Jessi Vann      : 1960      MRN: [id number]  Encounter Provider: David Celaya DO  Encounter Date: 2024   Encounter department: [facility]    Assessment & Plan   1. Chronic venous hypertension w ulceration (HCC)  -     Case request operating room: ENDOVASCULAR LASER THERAPY (EVLT); Standing  -     Basic metabolic panel; Future  -     CBC and Platelet; Future  -     Case request operating room: ENDOVASCULAR LASER THERAPY (EVLT)  2. Venous ulcer of left leg (HCC)  Assessment & Plan:  This is a pleasant 63-year-old woman who is referred to the office for a chronic nonhealing small left lower extremity venous ulceration.  She has prior history of right great saphenous vein intervention in the past.  She is referred to the office for a nonhealing left lower leg venous ulceration.  The Unna boot was removed today in the office.  The wound was inspected.  There is no signs of infection.  Wound is superficial with no appreciable depth approximately 1/2 to 1 cm in diameter.  Her recent reflux study does suggest both deep and superficial venous incompetence.  Recommend left greater saphenous vein closure (VenaSeal versus EVLT).  The procedure, risk, benefits, alternatives, and anticipated postoperative course were discussed in detail.  Patient was agreeable to proceed.  Risks and was obtained.  Of note the Unna boot was replaced by me today in the office.  A small piece of non-Adaptic was placed over the wound with subsequent Unna boot placement.  A Coban was placed over the Unna boot followed by Tubigrip.      History of Present Illness     Jessi Vann is a 63 y.o. female who presents to the office for nonhealing left lower leg venous ulceration.    Patient presents today for an ulcer on left leg. Patient c/o worsening ulcer of left lower leg. Patient gets unaboot done once a week by wound care. Patient is a nonsmoker.     Review of Systems   Constitutional:  Negative.    HENT: Negative.     Eyes: Negative.    Respiratory: Negative.     Cardiovascular:  Positive for leg swelling.   Gastrointestinal: Negative.    Endocrine: Negative.    Genitourinary: Negative.    Musculoskeletal: Negative.    Skin:  Positive for wound.   Allergic/Immunologic: Negative.    Neurological: Negative.    Hematological: Negative.    Psychiatric/Behavioral: Negative.         Objective     BP (!) 182/114 (BP Location: Left arm, Patient Position: Sitting, Cuff Size: Adult)   Pulse 81   Wt 96.6 kg (213 lb)   SpO2 98%   BMI 38.96 kg/m²     Physical Exam  Vitals and nursing note reviewed.   Constitutional:       General: She is not in acute distress.     Appearance: She is well-developed.   HENT:      Head: Normocephalic and atraumatic.   Eyes:      Conjunctiva/sclera: Conjunctivae normal.   Cardiovascular:      Rate and Rhythm: Normal rate.      Pulses:           Dorsalis pedis pulses are 2+ on the left side.        Posterior tibial pulses are 1+ on the left side.      Heart sounds: No murmur heard.  Pulmonary:      Effort: Pulmonary effort is normal.   Abdominal:      Palpations: Abdomen is soft.      Tenderness: There is no abdominal tenderness.   Musculoskeletal:         General: No swelling.      Cervical back: Neck supple.   Skin:     General: Skin is warm and dry.      Capillary Refill: Capillary refill takes less than 2 seconds.   Neurological:      Mental Status: She is alert.   Psychiatric:         Mood and Affect: Mood normal.         Behavior: Behavior normal.         Thought Content: Thought content normal.         Judgment: Judgment normal.       Administrative Statements         EVLT Operative Scheduling Information:    Hospital:  [facility]    Physician:  Belia    Surgery: Left GSV Closure (venaseal v EVLT)    Urgency:  Standard    Level:  Level 3: Outpatients to be scheduled for elective surgery than can be delayed up to 4 weeks without reasonable expectation of detriment to  patient    Case Length:  2 hours    Post-op Bed:  Outpatient    OR Table:  Standard    Equipment Needs:  Rep: Venaseal (if insurance approved) otherwise EVLT    Medication Instructions:  None    Hydration:  No    Contrast Allergy:  No    Venous Clinical Severity Scores (VCSS)  Item Absent   (0 points) Mild   (1 point) Moderate   (2 points) Severe   (3 points)   Pain [] None [] Occasional [] Daily [x] Daily limiting   Varicose veins [] None [] Few [x] Calf or thigh [] Calf and thigh   Venous edema [] None [x] Foot and ankle [] Above ankle, below knee [] To knee of above   Skin pigmentation [] None [x] Perimalleolar [] Diffuse, lower 1/3 calf [] Wider, above lower 1/3 calf   Inflammation [] None [x] Perimalleolar [] Diffuse, lower 1/3 calf [] Wider, above lower 1/3 calf   Induration [] None [x] Perimalleolar [] Diffuse, lower 1/3 calf [] Wider, above lower 1/3 calf   No. active ulcers [] None [x] 1 [] 2 [] ?3   Active ulcer size [] None [] <2 cm [x] 2 - 6 cm [] >6 cm   Ulcer duration [] None [x] <3 months [] 3 - 12 months [] >1 year   Compression therapy [] None [] Intermittent [x] Most days [] Fully comply   Total 15          CEAP Clinical Classification  [x] Symptomatic   [] Asymptomatic     [] Class 0 No visible or palpable signs of venous disease   [] Class 1 Telangiectasies or reticular veins   [] Class 2 Varicose veins; distinguished from reticular veins by a diameter of 3mm or more   [] Class 3 Edema   [] Class 4 Changes in skin and subcutaneous tissue secondary to CVD    [] Class 4a Pigmentation or eczema   [] Class 4b Lipodermatosclerosis or atrophie claudia   [] Class 5 Healed venous ulcer   [x] Class 6 Active venous ulcer

## 2024-05-17 NOTE — ASSESSMENT & PLAN NOTE
This is a pleasant 63-year-old woman who is referred to the office for a chronic nonhealing small left lower extremity venous ulceration.  She has prior history of right great saphenous vein intervention in the past.  She is referred to the office for a nonhealing left lower leg venous ulceration.  The Unna boot was removed today in the office.  The wound was inspected.  There is no signs of infection.  Wound is superficial with no appreciable depth approximately 1/2 to 1 cm in diameter.  Her recent reflux study does suggest both deep and superficial venous incompetence.  Recommend left greater saphenous vein closure (VenaSeal versus EVLT).  The procedure, risk, benefits, alternatives, and anticipated postoperative course were discussed in detail.  Patient was agreeable to proceed.  Risks and was obtained.  Of note the Unna boot was replaced by me today in the office.  A small piece of non-Adaptic was placed over the wound with subsequent Unna boot placement.  A Coban was placed over the Unna boot followed by Tubigrip.

## 2024-05-20 ENCOUNTER — TELEPHONE (OUTPATIENT)
Age: 64
End: 2024-05-20

## 2024-05-21 ENCOUNTER — OFFICE VISIT (OUTPATIENT)
Dept: WOUND CARE | Facility: HOSPITAL | Age: 64
End: 2024-05-21
Payer: COMMERCIAL

## 2024-05-21 ENCOUNTER — TELEPHONE (OUTPATIENT)
Dept: VASCULAR SURGERY | Facility: CLINIC | Age: 64
End: 2024-05-21

## 2024-05-21 VITALS
TEMPERATURE: 97.2 F | RESPIRATION RATE: 16 BRPM | HEART RATE: 70 BPM | DIASTOLIC BLOOD PRESSURE: 87 MMHG | SYSTOLIC BLOOD PRESSURE: 164 MMHG

## 2024-05-21 DIAGNOSIS — L97.919 CHRONIC VENOUS HYPERTENSION (IDIOPATHIC) WITH ULCER OF RIGHT LOWER EXTREMITY (HCC): Primary | ICD-10-CM

## 2024-05-21 DIAGNOSIS — I87.311 CHRONIC VENOUS HYPERTENSION (IDIOPATHIC) WITH ULCER OF RIGHT LOWER EXTREMITY (HCC): Primary | ICD-10-CM

## 2024-05-21 DIAGNOSIS — I87.312 IDIOPATHIC CHRONIC VENOUS HYPERTENSION OF LEFT LOWER EXTREMITY WITH ULCER (HCC): Primary | ICD-10-CM

## 2024-05-21 DIAGNOSIS — L97.929 IDIOPATHIC CHRONIC VENOUS HYPERTENSION OF LEFT LOWER EXTREMITY WITH ULCER (HCC): Primary | ICD-10-CM

## 2024-05-21 PROCEDURE — 97597 DBRDMT OPN WND 1ST 20 CM/<: CPT | Performed by: PODIATRIST

## 2024-05-21 RX ORDER — LIDOCAINE 40 MG/G
CREAM TOPICAL ONCE
Status: COMPLETED | OUTPATIENT
Start: 2024-05-21 | End: 2024-05-21

## 2024-05-21 RX ADMIN — LIDOCAINE: 40 CREAM TOPICAL at 11:49

## 2024-05-21 NOTE — PROGRESS NOTES
Wound Procedure Treatment Venous Ulcer Left;Medial Leg    Performed by: Elva Booth RN  Authorized by: Freida Carlin DPM    Associated wounds:   Wound 03/26/24 Venous Ulcer Leg Left;Medial  Wound cleansed with:  Soap and water  Applied primary dressing:  Non adherent contact layer  Applied secondary dressing:  Gauze  Wound secured with:  Geni, Tape and Compression wrap    Procedure Details  Laterality:  Left  Location:  Leg  Cast type:  Multi-layer compression short leg  Supplies:  2 layer wrap  Post Procedure Details  Pain:  Unchanged  Sensation:  Normal  Skin color:  WNL  Patient tolerance of procedure:  Tolerated well, no immediate complications

## 2024-05-21 NOTE — PROGRESS NOTES
"Patient ID: Jessi Vann is a 63 y.o. female Date of Birth 1960       Chief Complaint   Patient presents with    Follow Up Wound Care Visit     LLE wound, unna boot       Allergies:  Wound dressing adhesive    Diagnosis:  1. Idiopathic chronic venous hypertension of left lower extremity with ulcer (HCC)  -     lidocaine (LMX) 4 % cream  -     Wound cleansing and dressings Venous Ulcer Left;Medial Leg; Future     Diagnosis ICD-10-CM Associated Orders   1. Idiopathic chronic venous hypertension of left lower extremity with ulcer (HCC)  I87.312 lidocaine (LMX) 4 % cream    L97.929 Wound cleansing and dressings Venous Ulcer Left;Medial Leg           Assessment & Plan:  Chronic venous stasis ulceration left anterior lower leg, wound was debrided through selective tissues and dressed with Adaptic dry dressing and Unna boot covered with Coflex, she is to leave this dressing dry clean and intact for 1 week.  She may use a cast cover to shower.  I reviewed patient's vascular surgery consultation note, she is going to proceed with EVLT of the left, tentatively scheduled for June 5.  In the meantime we will continue with frequent elevation, low-sodium diet, proper protein intake, patient understands and agrees with the plan and will follow-up in 1 week.      Debridement   Wound 03/26/24 Venous Ulcer Leg Left;Medial    Universal Protocol:  Consent: Verbal consent obtained.  Risks and benefits: risks, benefits and alternatives were discussed  Consent given by: patient  Time out: Immediately prior to procedure a \"time out\" was called to verify the correct patient, procedure, equipment, support staff and site/side marked as required.  Patient understanding: patient states understanding of the procedure being performed  Patient identity confirmed: verbally with patient    Debridement Details  Performed by: physician  Debridement type: selective  Pain control: lidocaine 4%      Post-debridement measurements  Length (cm): " 0.2  Width (cm): 0.5  Depth (cm): 0.1  Percent debrided: 100%  Surface Area (cm^2): 0.1  Area Debrided (cm^2): 0.1  Volume (cm^3): 0.01    Devitalized tissue debrided: biofilm, callus, slough and eschar  Instrument(s) utilized: blade  Bleeding: small  Hemostasis obtained with: pressure  Procedural pain (0-10): insensate  Post-procedural pain: insensate   Response to treatment: procedure was tolerated well         Subjective:   Jessi presents today for care of left lower leg venous stasis ulceration, she has been seen by vascular surgery and is in the process of setting up EVLT, she is tolerating Unna boot well with no new complaints.          The following portions of the patient's history were reviewed and updated as appropriate:   Patient Active Problem List   Diagnosis    Cellulitis of right anterior lower leg    Primary hypertension    Venous ulcer of left leg (HCC)    Chronic venous hypertension (idiopathic) with ulcer of right lower extremity (HCC)    COVID-19    Elevated liver enzymes    Hypokalemia    Diarrhea    Thrombophlebitis    UTI symptoms    Chronic venous hypertension w ulceration (HCC)     Past Medical History:   Diagnosis Date    Blood clot in vein 1999    RLE, post partum    COVID-19 12/2021    Hypertension     Ulcer of right shin (HCC)      Past Surgical History:   Procedure Laterality Date    FRACTURE SURGERY Right 1984    with hardware    VT ENDOVEN ABLTJ INCMPTNT VEIN XTR LASER 1ST VEIN Right 6/24/2022    Procedure: ENDOVASCULAR LASER THERAPY (EVLT) right leg with multiple stab phlebectomies;  Surgeon: Sohail Baer MD;  Location:  MAIN OR;  Service: Vascular    VT STAB PHLEBT VARICOSE VEINS 1 XTR > 20 INCS Right 6/24/2022    Procedure: MULTIPLE STAB PHLEBECTOMIES, 10 stabs;  Surgeon: Sohail Baer MD;  Location:  MAIN OR;  Service: Vascular     Social History     Socioeconomic History    Marital status: /Civil Union     Spouse name: None    Number of children: None    Years  of education: None    Highest education level: None   Occupational History    None   Tobacco Use    Smoking status: Never    Smokeless tobacco: Never   Vaping Use    Vaping status: Never Used   Substance and Sexual Activity    Alcohol use: Not Currently    Drug use: Never    Sexual activity: Not Currently   Other Topics Concern    None   Social History Narrative    None     Social Determinants of Health     Financial Resource Strain: Not on file   Food Insecurity: No Food Insecurity (1/12/2022)    Hunger Vital Sign     Worried About Running Out of Food in the Last Year: Never true     Ran Out of Food in the Last Year: Never true   Transportation Needs: Unknown (1/12/2022)    PRAPARE - Transportation     Lack of Transportation (Medical): No     Lack of Transportation (Non-Medical): Not on file   Physical Activity: Not on file   Stress: Not on file   Social Connections: Not on file   Intimate Partner Violence: Not on file   Housing Stability: Unknown (1/12/2022)    Housing Stability Vital Sign     Unable to Pay for Housing in the Last Year: No     Number of Places Lived in the Last Year: Not on file     Unstable Housing in the Last Year: No        Current Outpatient Medications:     Acetaminophen 325 MG CAPS, Take 3 capsules (975 mg total) by mouth every 8 (eight) hours as needed (mild pain), Disp: 30 capsule, Rfl: 0    betamethasone dipropionate (DIPROSONE) 0.05 % cream, Apply topically as needed (for redness), Disp: 30 g, Rfl: 0    fluocinolone (SYNALAR) 0.01 % external solution, Apply topically 2 (two) times a day Apply to area of inflammation twice daily as needed for flareups/burning.  Do not use for more than 7 days at a time.  Not for daily use, Disp: 60 mL, Rfl: 1    traMADol (ULTRAM) 50 mg tablet, Take 1 tablet (50 mg total) by mouth every 6 (six) hours as needed for moderate pain (Patient not taking: Reported on 8/1/2022), Disp: 30 tablet, Rfl: 0  No current facility-administered medications for this  visit.  Family History   Problem Relation Age of Onset    No Known Problems Mother     No Known Problems Father     No Known Problems Sister     No Known Problems Brother     No Known Problems Daughter     No Known Problems Son     No Known Problems Maternal Aunt     No Known Problems Maternal Uncle     No Known Problems Paternal Aunt     No Known Problems Paternal Uncle     No Known Problems Maternal Grandmother     No Known Problems Maternal Grandfather     No Known Problems Paternal Grandmother     No Known Problems Paternal Grandfather     No Known Problems Cousin        Review of Systems   Constitutional:  Negative for chills and fever.   HENT:  Negative for ear pain and sore throat.    Eyes:  Negative for pain and visual disturbance.   Respiratory:  Negative for cough and shortness of breath.    Cardiovascular:  Negative for chest pain and palpitations.   Gastrointestinal:  Negative for abdominal pain and vomiting.   Genitourinary:  Negative for dysuria and hematuria.   Musculoskeletal:  Positive for gait problem. Negative for arthralgias and back pain.   Skin:  Positive for color change and wound. Negative for rash.   Neurological:  Positive for numbness. Negative for seizures and syncope.   Psychiatric/Behavioral: Negative.     All other systems reviewed and are negative.        Objective:  /87   Pulse 70   Temp (!) 97.2 °F (36.2 °C)   Resp 16     Physical Exam  Constitutional:       Appearance: Normal appearance. She is well-developed.   HENT:      Head: Normocephalic and atraumatic.      Mouth/Throat:      Mouth: Mucous membranes are moist.      Pharynx: Oropharynx is clear.   Eyes:      Conjunctiva/sclera: Conjunctivae normal.      Pupils: Pupils are equal, round, and reactive to light.   Cardiovascular:      Pulses:           Dorsalis pedis pulses are 2+ on the right side and 2+ on the left side.        Posterior tibial pulses are 2+ on the right side and 2+ on the left side.   Pulmonary:       Effort: Pulmonary effort is normal.   Musculoskeletal:         General: Normal range of motion.      Cervical back: Normal range of motion.      Right lower le+ Edema present.      Left lower le+ Edema present.   Skin:     General: Skin is warm and dry.      Capillary Refill: Capillary refill takes less than 2 seconds.   Neurological:      General: No focal deficit present.      Mental Status: She is alert and oriented to person, place, and time.   Psychiatric:         Mood and Affect: Mood normal.         Behavior: Behavior normal.         Thought Content: Thought content normal.         Judgment: Judgment normal.             Wound 24 Venous Ulcer Leg Left;Medial (Active)   Wound Image   24 1143   Wound Description Pink;Brown;Eschar 24 1147   Angelika-wound Assessment Intact;Edema 24 1147   Wound Length (cm) 0.2 cm 24 1147   Wound Width (cm) 0.5 cm 24 1147   Wound Depth (cm) 0.1 cm 24 1147   Wound Surface Area (cm^2) 0.1 cm^2 24 1147   Wound Volume (cm^3) 0.01 cm^3 24 1147   Calculated Wound Volume (cm^3) 0.01 cm^3 24 1147   Change in Wound Size % 66.67 24 1147   Drainage Amount Small 24 1147   Drainage Description Serosanguineous 24 1147   Non-staged Wound Description Full thickness 24 1147   Dressing Status Intact 24 1147                     No results found.    Wound Instructions:  Orders Placed This Encounter   Procedures    Debridement     This order was created via procedure documentation    Wound cleansing and dressings Venous Ulcer Left;Medial Leg     Multi-layer compression wrap Instructions:  left leg Unna boot.       Keep compression wrap/wraps clean and dry.      If wraps are too tight and you experience numbness/tingling, call the wound center. If after hours, remove wraps or proceed to nearest E.R. and call wound center in AM.     Wrap will be changed 1 x weekly at [facility].       Avoid prolonged standing in one  "place.     Elevate leg(s) above the level of the heart when sitting or as much as possible.     May shower with cast cover.     Standing Status:   Future     Standing Expiration Date:   5/28/2024    Wound Procedure Treatment Venous Ulcer Left;Medial Leg     This order was created via procedure documentation         Freida Carlin, KELVIN, DPM, FACFAS    Portions of the record may have been created with voice recognition software. Occasional wrong word or \"sound a like\" substitutions may have occurred due to the inherent limitations of voice recognition software. Read the chart carefully and recognize, using context, where substitutions have occurred.    "

## 2024-05-21 NOTE — TELEPHONE ENCOUNTER
Verified patient's insurance   CONFIRMED - Patient's insurance is [employer]  Is patient requesting a call when authorization has been obtained? Patient did not request a call.    Surgery Date: 6/5/24  Primary Surgeon: ADDY Guidry/ David Fair (NPI: [id number])  Assisting Surgeon: Not Applicable (N/A)  Facility: [facility] (Tax: [id number] / NPI: [id number])  Inpatient / Outpatient: Outpatient  Level: 2    Clearance Received: No clearance ordered.  Consent Received: Yes, scanned into Epic on 5/17/24.  Medication Hold / Last Dose:  no med hold  IR Notified: Not Applicable (N/A)  Rep. Notified:  solomon angel emailed 5/21/24  Equipment Needs: Not Applicable (N/A)  Vas Lab Requested:  5/21/24   Patient Contacted: 5/21/24 confirmed date w/her daughter     Diagnosis: I87.319, L97.909  Procedure/ CPT Code(s): (EVLT) Endovenous Laser Treatment of the left upper leg // CPT: 33288    For varicose vein related procedures:   Last LEVDR:  4/9/24, patient's LEVDR was completed within 12-months of their procedure date.  CEAP Classification:  6  VCSS:  15    Post Operative Date/ Time: 6/7/24 , 1130am duplex at [address] [place] [address] with Diane Crum (NPI: [id number])     *Please review medication hold(s), PATs, and check H&P with patient.*  PATIENT WAS MAILED SURGERY/SHOWERING/DISCHARGE/COVID INSTRUCTIONS AFTER REVIEWING WITH THEM VIA PHONE CALL.

## 2024-05-21 NOTE — PATIENT INSTRUCTIONS
Orders Placed This Encounter   Procedures    Debridement     This order was created via procedure documentation    Wound cleansing and dressings Venous Ulcer Left;Medial Leg     Multi-layer compression wrap Instructions:  left leg Unna boot.       Keep compression wrap/wraps clean and dry.      If wraps are too tight and you experience numbness/tingling, call the wound center. If after hours, remove wraps or proceed to nearest E.R. and call wound center in AM.     Wrap will be changed 1 x weekly at [facility].       Avoid prolonged standing in one place.     Elevate leg(s) above the level of the heart when sitting or as much as possible.     May shower with cast cover.     Standing Status:   Future     Standing Expiration Date:   5/28/2024

## 2024-05-22 NOTE — PROGRESS NOTES
[facility]   Candy HAJI    Assessment/Plan:   1. Encounter to establish care with new doctor  2. Venous ulcer of left leg (HCC)  Assessment & Plan:  Wearing LLE unna boot.  Followed by Wound care and vascular surgery.  Planned EVLT on 6/5/24  3. Prolapsed uterus  Assessment & Plan:  HX prolapsed uterus with recurrent UTI.  No current s/s.  Referral to GYN for further eval/tx  Orders:  -     Ambulatory Referral to Obstetrics / Gynecology; Future  4. Hypokalemia  -     Comprehensive metabolic panel; Future  -     Comprehensive metabolic panel  5. Elevated liver enzymes  Assessment & Plan:  HX LFT in the presence of COVID infection in 2022.  Hepatitis panel negative.    Will recheck CMP  Orders:  -     Comprehensive metabolic panel; Future  -     Comprehensive metabolic panel  6. Primary hypertension  Assessment & Plan:  Hx HTN.  Blood pressure in office 128/84.  No HA, dyspnea, chest pressure/pain.    Orders:  -     Comprehensive metabolic panel; Future  -     Comprehensive metabolic panel  7. Screening for thyroid disorder  -     TSH, 3rd generation with Free T4 reflex; Future  -     TSH, 3rd generation with Free T4 reflex  8. Screening for deficiency anemia  -     CBC and differential; Future  -     CBC and differential  9. Screening for lipid disorders  -     Lipid panel; Future  -     Lipid panel  10. Vitamin D deficiency  -     Vitamin D 25 hydroxy; Future  -     Vitamin D 25 hydroxy  11. Vitamin B12 deficiency  -     Vitamin B12; Future  -     Vitamin B12  12. History of deep vein thrombosis (DVT) of lower extremity  -     VAS ARTERIAL DUPLEX-LOWER LIMB UNILATERAL; Future; Expected date: 05/23/2024  13. Preop examination  Assessment & Plan:  Hx of chronic non-healing left lower extremity venous ulceration. She is being followed closely by Wound care as well as Vascular Surgery.  Has had prior right great saphenous vein intervention in the past.  Dr. Celaya is planning to perform left  greater saphenous vein closure on 6/5/24.  Anesthesia will be general; Ms. Vann has not had any issues with anesthesia in the past.  No remote/recent hx of bleed.  She is not opposed to blood transfusion if required.  She does not take any NSAIDs, ASA, blood thinners.  No steroid use >2 weeks in the past year.   No hx MI, heart failure nor heart arrhthymias.  Able to work 4 blocks without chest pressure/pain/dyspnea.  Able to climb two flights of stairs without chest pressure/pain/dyspnea.    Is ordered CBC with plt, BMP prior to surgery, no further pre-op testing ordered by vascular.  Will order routine labs including CBCD/CMP/LP/TSH.    14. History of DVT of lower extremity      Staff messaged sent to Dr. Celaya regarding blood work and arterial doppler RLE ordered.  Obtain fasting blood work.  No eating/caffeine for 8 hours prior.  Drink plenty of water  No follow-ups on file.  Patient may call or return to office with any questions or concerns.     ______________________________________________________________________  Subjective:     Patient ID: Jessi Vann is a 63 y.o. female.  Jessi Vann  Chief Complaint   Patient presents with    Establish Care       Here to establish care.  Accompanied by daughter who assists with HPI.  Hx of chronic non-healing left lower extremity venous ulceration. She is being followed closely by Wound care as well as Vascular Surgery.  Has had prior right great saphenous vein intervention in the past.  Dr. Celaya is planning to perform left greater saphenous vein closure on 6/5/24.  Anesthesia will be general; Ms. Vann has not had any issues with anesthesia in the past.  No remote/recent hx of bleed.  She is not opposed to blood transfusion if required.  She does not take any NSAIDs, ASA, blood thinners.  Hx DVT with pregnancy in RLE.   No steroid use >2 weeks in the past year.   No hx MI, heart failure nor heart arrhthymias.  Able to work 4 blocks without chest  pressure/pain/dyspnea.  Able to climb two flights of stairs without chest pressure/pain/dyspnea.  Has been ordered CBC with plt as well as BMP prior to surgery.  No EKG/CXR required.      ; living with spouse and two adult children.  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      PMH:  HTN, Venous ulcerations, UTI, prolapsed uterus, COVID 19  PSH:  fractured leg with hardware  FHx:  unknown, sister with HTN    Redness on right calf:  onset April.  Given two courses of antibiotics which finished 1.5 weeks ago (keflex then bactrim) without resolution.  No fever/chills.  No increased heat, painful/burning sensation persistent.  Putting synalar solution on only at night.  Wearing stockings during the day.       Daughter notes increased redness as day progresses, is on her feet most of the day.  Improved color with elevation. Thought to be more phlebitis.             Depression Screening and Follow-up Plan: Patient was screened for depression during today's encounter. They screened negative with a PHQ-2 score of 0.      The following portions of the patient's history were reviewed and updated as appropriate: allergies, current medications, past family history, past medical history, past social history, past surgical history, and problem list.    Review of Systems   Constitutional: Negative.  Negative for activity change, appetite change, chills, fatigue and fever.   HENT: Negative.  Negative for congestion, ear pain, postnasal drip and sinus pain.    Eyes: Negative.    Respiratory: Negative.  Negative for cough, chest tightness and shortness of breath.    Cardiovascular: Negative.  Negative for chest pain, palpitations and leg swelling.   Gastrointestinal: Negative.  Negative for constipation and diarrhea.   Endocrine: Negative.    Genitourinary: Negative.  Negative for difficulty urinating and dysuria.   Musculoskeletal:  Positive for gait problem.   Skin:  Positive for color change and wound.    Allergic/Immunologic: Negative.  Negative for immunocompromised state.   Neurological:  Negative for dizziness, weakness, light-headedness and numbness.   Hematological: Negative.    Psychiatric/Behavioral: Negative.  Negative for sleep disturbance.          Objective:      Vitals:    05/23/24 0758   BP: 128/84   Pulse: 69   Temp: (!) 97.3 °F (36.3 °C)   SpO2: 97%      Physical Exam  Vitals and nursing note reviewed.   Constitutional:       Appearance: Normal appearance. She is obese.   HENT:      Head: Normocephalic and atraumatic.      Right Ear: Tympanic membrane, ear canal and external ear normal.      Left Ear: Tympanic membrane, ear canal and external ear normal.      Nose: Nose normal.      Mouth/Throat:      Mouth: Mucous membranes are moist.      Pharynx: Oropharynx is clear.   Eyes:      Extraocular Movements: Extraocular movements intact.      Conjunctiva/sclera: Conjunctivae normal.      Pupils: Pupils are equal, round, and reactive to light.   Cardiovascular:      Rate and Rhythm: Normal rate and regular rhythm.      Pulses:           Dorsalis pedis pulses are 2+ on the right side.        Posterior tibial pulses are 1+ on the right side.      Heart sounds: Normal heart sounds.   Pulmonary:      Effort: Pulmonary effort is normal.      Breath sounds: Normal breath sounds.   Abdominal:      General: Bowel sounds are normal.      Palpations: Abdomen is soft.   Musculoskeletal:         General: Normal range of motion.      Cervical back: Normal range of motion and neck supple.      Right lower leg: Edema present.      Left lower leg: Edema present.      Comments: Negative homans RLE.  +trace pitting with compression stocking  Unna boot LLE   Skin:     General: Skin is warm and dry.      Comments: RLE with scarring  Right calf with redness, no increased warmth.  +pain.  Negative homans.  See media   Neurological:      General: No focal deficit present.      Mental Status: She is alert and oriented to  "person, place, and time.      Gait: Gait abnormal.   Psychiatric:         Mood and Affect: Mood normal.         Behavior: Behavior normal.         Thought Content: Thought content normal.         Judgment: Judgment normal.           Portions of the record may have been created with voice recognition software. Occasional wrong word or \"sound alike\" substitutions may have occurred due to the inherent limitations of voice recognition software. Please review the chart carefully and recognize, using context, where substitutions/typographical errors may have occurred.       "

## 2024-05-23 ENCOUNTER — ANESTHESIA EVENT (OUTPATIENT)
Dept: PERIOP | Facility: HOSPITAL | Age: 64
End: 2024-05-23
Payer: COMMERCIAL

## 2024-05-23 ENCOUNTER — OFFICE VISIT (OUTPATIENT)
Dept: FAMILY MEDICINE CLINIC | Facility: HOSPITAL | Age: 64
End: 2024-05-23
Payer: COMMERCIAL

## 2024-05-23 VITALS
HEART RATE: 69 BPM | OXYGEN SATURATION: 97 % | TEMPERATURE: 97.3 F | SYSTOLIC BLOOD PRESSURE: 128 MMHG | WEIGHT: 211 LBS | DIASTOLIC BLOOD PRESSURE: 84 MMHG | BODY MASS INDEX: 38.59 KG/M2

## 2024-05-23 DIAGNOSIS — N81.4 PROLAPSED UTERUS: ICD-10-CM

## 2024-05-23 DIAGNOSIS — I10 PRIMARY HYPERTENSION: ICD-10-CM

## 2024-05-23 DIAGNOSIS — Z13.29 SCREENING FOR THYROID DISORDER: ICD-10-CM

## 2024-05-23 DIAGNOSIS — L97.929 VENOUS ULCER OF LEFT LEG (HCC): ICD-10-CM

## 2024-05-23 DIAGNOSIS — I83.029 VENOUS ULCER OF LEFT LEG (HCC): ICD-10-CM

## 2024-05-23 DIAGNOSIS — R74.8 ELEVATED LIVER ENZYMES: ICD-10-CM

## 2024-05-23 DIAGNOSIS — Z76.89 ENCOUNTER TO ESTABLISH CARE WITH NEW DOCTOR: Primary | ICD-10-CM

## 2024-05-23 DIAGNOSIS — Z86.718 HISTORY OF DVT OF LOWER EXTREMITY: ICD-10-CM

## 2024-05-23 DIAGNOSIS — Z86.718 HISTORY OF DEEP VEIN THROMBOSIS (DVT) OF LOWER EXTREMITY: ICD-10-CM

## 2024-05-23 DIAGNOSIS — E53.8 VITAMIN B12 DEFICIENCY: ICD-10-CM

## 2024-05-23 DIAGNOSIS — Z13.0 SCREENING FOR DEFICIENCY ANEMIA: ICD-10-CM

## 2024-05-23 DIAGNOSIS — Z01.818 PREOP EXAMINATION: ICD-10-CM

## 2024-05-23 DIAGNOSIS — Z13.220 SCREENING FOR LIPID DISORDERS: ICD-10-CM

## 2024-05-23 DIAGNOSIS — E55.9 VITAMIN D DEFICIENCY: ICD-10-CM

## 2024-05-23 DIAGNOSIS — E87.6 HYPOKALEMIA: ICD-10-CM

## 2024-05-23 PROBLEM — R19.7 DIARRHEA: Status: RESOLVED | Noted: 2022-01-13 | Resolved: 2024-05-23

## 2024-05-23 PROBLEM — R39.9 UTI SYMPTOMS: Status: RESOLVED | Noted: 2024-02-04 | Resolved: 2024-05-23

## 2024-05-23 PROCEDURE — 99203 OFFICE O/P NEW LOW 30 MIN: CPT | Performed by: NURSE PRACTITIONER

## 2024-05-23 NOTE — PATIENT INSTRUCTIONS
Obtain fasting blood work.  No eating/caffeine for 8 hours prior.  Drink plenty of water  Right lower extremity doppler

## 2024-05-23 NOTE — PRE-PROCEDURE INSTRUCTIONS
Pre-Surgery Instructions:   Medication Instructions    Acetaminophen 325 MG CAPS Uses PRN- OK to take day of surgery    betamethasone dipropionate (DIPROSONE) 0.05 % cream Hold day of surgery.    fluocinolone (SYNALAR) 0.01 % external solution Hold day of surgery.   Medication instructions for day surgery reviewed. Please use only a sip of water to take your instructed medications. Avoid all over the counter vitamins, supplements and NSAIDS for one week prior to surgery per anesthesia guidelines. Tylenol is ok to take as needed.     You will receive a call one business day prior to surgery with an arrival time and hospital directions. If your surgery is scheduled on a Monday, the hospital will be calling you on the Friday prior to your surgery. If you have not heard from anyone by 8pm, please call the hospital supervisor through the hospital  at [phone]. ([place] [phone] or [place] [phone]).    Do not eat or drink anything after midnight the night before your surgery, including candy, mints, lifesavers, or chewing gum. Do not drink alcohol 24hrs before your surgery. Try not to smoke at least 24hrs before your surgery.       Follow the pre surgery showering instructions as listed in the “My Surgical Experience Booklet” or otherwise provided by your surgeon's office. Do not use a blade to shave the surgical area 1 week before surgery. It is okay to use a clean electric clippers up to 24 hours before surgery. Do not apply any lotions, creams, including makeup, cologne, deodorant, or perfumes after showering on the day of your surgery. Do not use dry shampoo, hair spray, hair gel, or any type of hair products.     No contact lenses, eye make-up, or artificial eyelashes. Remove nail polish, including gel polish, and any artificial, gel, or acrylic nails if possible. Remove all jewelry including rings and body piercing jewelry.     Wear causal clothing that is easy to take on and off. Consider  your type of surgery.    Keep any valuables, jewelry, piercings at home. Please bring any specially ordered equipment (sling, braces) if indicated.    Arrange for a responsible person to drive you to and from the hospital on the day of your surgery. Please confirm the visitor policy for the day of your procedure when you receive your phone call with an arrival time.     Call the surgeon's office with any new illnesses, exposures, or additional questions prior to surgery.    Please reference your “My Surgical Experience Booklet” for additional information to prepare for your upcoming surgery.

## 2024-05-23 NOTE — ASSESSMENT & PLAN NOTE
Hx of chronic non-healing left lower extremity venous ulceration. She is being followed closely by Wound care as well as Vascular Surgery.  Has had prior right great saphenous vein intervention in the past.  Dr. Celaya is planning to perform left greater saphenous vein closure on 6/5/24.  Anesthesia will be general; Ms. Vann has not had any issues with anesthesia in the past.  No remote/recent hx of bleed.  She is not opposed to blood transfusion if required.  She does not take any NSAIDs, ASA, blood thinners.  No steroid use >2 weeks in the past year.   No hx MI, heart failure nor heart arrhthymias.  Able to work 4 blocks without chest pressure/pain/dyspnea.  Able to climb two flights of stairs without chest pressure/pain/dyspnea.    Is ordered CBC with plt, BMP prior to surgery, no further pre-op testing ordered by vascular.  Will order routine labs including CBCD/CMP/LP/TSH.

## 2024-05-28 ENCOUNTER — OFFICE VISIT (OUTPATIENT)
Dept: WOUND CARE | Facility: HOSPITAL | Age: 64
End: 2024-05-28
Payer: COMMERCIAL

## 2024-05-28 VITALS
TEMPERATURE: 97.8 F | DIASTOLIC BLOOD PRESSURE: 87 MMHG | RESPIRATION RATE: 18 BRPM | SYSTOLIC BLOOD PRESSURE: 166 MMHG | HEART RATE: 77 BPM

## 2024-05-28 DIAGNOSIS — I87.312 IDIOPATHIC CHRONIC VENOUS HYPERTENSION OF LEFT LOWER EXTREMITY WITH ULCER (HCC): Primary | ICD-10-CM

## 2024-05-28 DIAGNOSIS — L97.929 IDIOPATHIC CHRONIC VENOUS HYPERTENSION OF LEFT LOWER EXTREMITY WITH ULCER (HCC): Primary | ICD-10-CM

## 2024-05-28 PROCEDURE — 11042 DBRDMT SUBQ TIS 1ST 20SQCM/<: CPT | Performed by: PODIATRIST

## 2024-05-28 RX ORDER — LIDOCAINE 40 MG/G
CREAM TOPICAL ONCE
Status: COMPLETED | OUTPATIENT
Start: 2024-05-28 | End: 2024-05-28

## 2024-05-28 RX ADMIN — LIDOCAINE: 40 CREAM TOPICAL at 10:53

## 2024-05-28 NOTE — PATIENT INSTRUCTIONS
Orders Placed This Encounter   Procedures    Wound cleansing and dressings Venous Ulcer Left;Medial Leg     Multi-layer compression wrap Instructions:  left leg Unna boot.       Keep compression wrap/wraps clean and dry.      If wraps are too tight and you experience numbness/tingling, call the wound center. If after hours, remove wraps or proceed to nearest E.R. and call wound center in AM.     Wrap will be changed 1 x weekly at [facility].       Avoid prolonged standing in one place.     Elevate leg(s) above the level of the heart when sitting or as much as possible.     May shower with cast cover.     Standing Status:   Future     Standing Expiration Date:   6/11/2024

## 2024-05-28 NOTE — PROGRESS NOTES
Wound Procedure Treatment Venous Ulcer Left;Medial Leg    Performed by: Roxie Ramirez RN  Authorized by: Freida Carlin DPM    Associated wounds:   Wound 03/26/24 Venous Ulcer Leg Left;Medial  Wound cleansed with:  NSS and Soap and water  Applied to periwound:  Moisture lotion  Applied primary dressing:  Non adherent contact layer  Applied secondary dressing:  Gauze  Dressing secured with:  Geni, Coban, Tubifast and Compression wrap  Unna boot

## 2024-05-28 NOTE — PROGRESS NOTES
"Patient ID: Jessi Vann is a 63 y.o. female Date of Birth 1960       Chief Complaint   Patient presents with    Follow Up Wound Care Visit     LLE wound       Allergies:  Wound dressing adhesive    Diagnosis:  1. Idiopathic chronic venous hypertension of left lower extremity with ulcer (HCC)  -     lidocaine (LMX) 4 % cream  -     Wound cleansing and dressings Venous Ulcer Left;Medial Leg; Future  -     Wound Procedure Treatment Venous Ulcer Left;Medial Leg     Diagnosis ICD-10-CM Associated Orders   1. Idiopathic chronic venous hypertension of left lower extremity with ulcer (HCC)  I87.312 lidocaine (LMX) 4 % cream    L97.929 Wound cleansing and dressings Venous Ulcer Left;Medial Leg     Wound Procedure Treatment Venous Ulcer Left;Medial Leg           Assessment & Plan:  Chronic venous stasis ulceration left anterior lower leg continues to be stable, wound was debrided through subcuticular tissues and dressed with Adaptic dry dressing Unna boot covered with Coflex, she is to leave this dry clean and intact for 1 week, she is to follow instructions from vascular surgery as she is to have procedure next week, she may use a cast cover to shower.  Patient to maintain frequent elevation, low-sodium diet, proper protein intake, she understands and agrees with the plan and she will follow-up in 2 weeks as she is to go undergo EVLT next week.    Debridement    Universal Protocol:  Consent: Verbal consent obtained.  Risks and benefits: risks, benefits and alternatives were discussed  Consent given by: patient  Time out: Immediately prior to procedure a \"time out\" was called to verify the correct patient, procedure, equipment, support staff and site/side marked as required.  Patient understanding: patient states understanding of the procedure being performed  Patient identity confirmed: verbally with patient    Debridement Details  Performed by: physician  Debridement type: surgical  Level of debridement: subcutaneous " tissue  Pain control: lidocaine 4%      Post-debridement measurements  Length (cm): 0.4  Width (cm): 0.4  Depth (cm): 0.2  Percent debrided: 100%  Surface Area (cm^2): 0.16  Area Debrided (cm^2): 0.16  Volume (cm^3): 0.03    Tissue and other material debrided: subcutaneous tissue  Devitalized tissue debrided: biofilm, fibrin, necrotic debris and slough  Instrument(s) utilized: blade  Bleeding: small  Hemostasis obtained with: pressure  Procedural pain (0-10): insensate  Post-procedural pain: insensate   Response to treatment: procedure was tolerated well               Subjective:   Jessi presents today for care of of left anterior lower leg venous stasis ulceration, she is tolerating her Unna boot well, no new complaints, is scheduled for EVLT in 1 week.          The following portions of the patient's history were reviewed and updated as appropriate:   Patient Active Problem List   Diagnosis    Cellulitis of right anterior lower leg    Primary hypertension    Venous ulcer of left leg (HCC)    Chronic venous hypertension (idiopathic) with ulcer of right lower extremity (HCC)    COVID-19    Elevated liver enzymes    Hypokalemia    Thrombophlebitis    Chronic venous hypertension w ulceration (HCC)    Prolapsed uterus    Preop examination    History of DVT of lower extremity     Past Medical History:   Diagnosis Date    Blood clot in vein 1999    RLE, post partum    COVID-19 12/2021    Hypertension     Ulcer of right shin (HCC)      Past Surgical History:   Procedure Laterality Date    FRACTURE SURGERY Right 1984    with hardware; right leg    ID ENDOVEN ABLTJ INCMPTNT VEIN XTR LASER 1ST VEIN Right 06/24/2022    Procedure: ENDOVASCULAR LASER THERAPY (EVLT) right leg with multiple stab phlebectomies;  Surgeon: Sohail Baer MD;  Location:  MAIN OR;  Service: Vascular    ID STAB PHLEBT VARICOSE VEINS 1 XTR > 20 INCS Right 06/24/2022    Procedure: MULTIPLE STAB PHLEBECTOMIES, 10 stabs;  Surgeon: Sohail Baer MD;   Location:  MAIN OR;  Service: Vascular     Social History     Socioeconomic History    Marital status: /Civil Union     Spouse name: Not on file    Number of children: Not on file    Years of education: Not on file    Highest education level: Not on file   Occupational History    Not on file   Tobacco Use    Smoking status: Never    Smokeless tobacco: Never   Vaping Use    Vaping status: Never Used   Substance and Sexual Activity    Alcohol use: Not Currently    Drug use: Never    Sexual activity: Not Currently   Other Topics Concern    Not on file   Social History Narrative    Not on file     Social Determinants of Health     Financial Resource Strain: Not on file   Food Insecurity: No Food Insecurity (1/12/2022)    Hunger Vital Sign     Worried About Running Out of Food in the Last Year: Never true     Ran Out of Food in the Last Year: Never true   Transportation Needs: Unknown (1/12/2022)    PRAPARE - Transportation     Lack of Transportation (Medical): No     Lack of Transportation (Non-Medical): Not on file   Physical Activity: Not on file   Stress: Not on file   Social Connections: Not on file   Intimate Partner Violence: Not on file   Housing Stability: Unknown (1/12/2022)    Housing Stability Vital Sign     Unable to Pay for Housing in the Last Year: No     Number of Places Lived in the Last Year: Not on file     Unstable Housing in the Last Year: No        Current Outpatient Medications:     Acetaminophen 325 MG CAPS, Take 3 capsules (975 mg total) by mouth every 8 (eight) hours as needed (mild pain), Disp: 30 capsule, Rfl: 0    betamethasone dipropionate (DIPROSONE) 0.05 % cream, Apply topically as needed (for redness), Disp: 30 g, Rfl: 0    fluocinolone (SYNALAR) 0.01 % external solution, Apply topically 2 (two) times a day Apply to area of inflammation twice daily as needed for flareups/burning.  Do not use for more than 7 days at a time.  Not for daily use, Disp: 60 mL, Rfl: 1  No current  facility-administered medications for this visit.  Family History   Problem Relation Age of Onset    No Known Problems Mother     No Known Problems Father     No Known Problems Sister     No Known Problems Brother     No Known Problems Daughter     No Known Problems Son     No Known Problems Maternal Aunt     No Known Problems Maternal Uncle     No Known Problems Paternal Aunt     No Known Problems Paternal Uncle     No Known Problems Maternal Grandmother     No Known Problems Maternal Grandfather     No Known Problems Paternal Grandmother     No Known Problems Paternal Grandfather     No Known Problems Cousin        Review of Systems   Constitutional:  Negative for chills and fever.   HENT:  Negative for ear pain and sore throat.    Eyes:  Negative for pain and visual disturbance.   Respiratory:  Negative for cough and shortness of breath.    Cardiovascular:  Negative for chest pain and palpitations.   Gastrointestinal:  Negative for abdominal pain and vomiting.   Genitourinary:  Negative for dysuria and hematuria.   Musculoskeletal:  Positive for gait problem. Negative for arthralgias and back pain.   Skin:  Positive for color change and wound. Negative for rash.   Neurological:  Positive for numbness. Negative for seizures and syncope.   Psychiatric/Behavioral: Negative.     All other systems reviewed and are negative.        Objective:  /87   Pulse 77   Temp 97.8 °F (36.6 °C)   Resp 18     Physical Exam  Constitutional:       Appearance: Normal appearance. She is well-developed. She is obese.   HENT:      Head: Normocephalic and atraumatic.      Mouth/Throat:      Mouth: Mucous membranes are moist.      Pharynx: Oropharynx is clear.   Eyes:      Conjunctiva/sclera: Conjunctivae normal.      Pupils: Pupils are equal, round, and reactive to light.   Cardiovascular:      Pulses:           Dorsalis pedis pulses are 2+ on the right side and 2+ on the left side.        Posterior tibial pulses are 2+ on the  right side and 2+ on the left side.   Pulmonary:      Effort: Pulmonary effort is normal.   Musculoskeletal:         General: Normal range of motion.      Cervical back: Normal range of motion.      Right lower le+ Pitting Edema present.      Left lower le+ Pitting Edema present.   Skin:     General: Skin is warm and dry.      Capillary Refill: Capillary refill takes less than 2 seconds.   Neurological:      General: No focal deficit present.      Mental Status: She is alert and oriented to person, place, and time.   Psychiatric:         Mood and Affect: Mood normal.         Behavior: Behavior normal.         Thought Content: Thought content normal.         Judgment: Judgment normal.           Wound 24 Venous Ulcer Leg Left;Medial (Active)   Wound Image Images linked 24 104   Wound Description Pink;Epithelialization;Granulation tissue;White 24 1049   Angelika-wound Assessment Intact;Edema 24 1049   Wound Length (cm) 0.3 cm 24 1049   Wound Width (cm) 0.4 cm 24 1049   Wound Depth (cm) 0.1 cm 24 1049   Wound Surface Area (cm^2) 0.12 cm^2 24 1049   Wound Volume (cm^3) 0.012 cm^3 24 1049   Calculated Wound Volume (cm^3) 0.01 cm^3 24 1049   Change in Wound Size % 66.67 24 1049   Drainage Amount Scant 24 1049   Drainage Description Serosanguineous 24 1049   Non-staged Wound Description Full thickness 24 1049   Dressing Status Intact 24 1049                No results found.    Wound Instructions:  Orders Placed This Encounter   Procedures    Wound cleansing and dressings Venous Ulcer Left;Medial Leg     Multi-layer compression wrap Instructions:  left leg Unna boot.       Keep compression wrap/wraps clean and dry.      If wraps are too tight and you experience numbness/tingling, call the wound center. If after hours, remove wraps or proceed to nearest E.R. and call wound center in AM.     Wrap will be changed 1 x weekly at [facility].   "     Avoid prolonged standing in one place.     Elevate leg(s) above the level of the heart when sitting or as much as possible.     May shower with cast cover.     Standing Status:   Future     Standing Expiration Date:   6/11/2024    Wound Procedure Treatment Venous Ulcer Left;Medial Leg     This order was created via procedure documentation         Freida Carlin, KELVIN, DPLAURA, FACFAS    Portions of the record may have been created with voice recognition software. Occasional wrong word or \"sound a like\" substitutions may have occurred due to the inherent limitations of voice recognition software. Read the chart carefully and recognize, using context, where substitutions have occurred.    "

## 2024-05-29 LAB
25(OH)D3 SERPL-MCNC: 8 NG/ML (ref 30–100)
ALBUMIN SERPL-MCNC: 4.3 G/DL (ref 3.6–5.1)
ALBUMIN/GLOB SERPL: 1.5 (CALC) (ref 1–2.5)
ALP SERPL-CCNC: 108 U/L (ref 37–153)
ALT SERPL-CCNC: 23 U/L (ref 6–29)
AST SERPL-CCNC: 22 U/L (ref 10–35)
BASOPHILS # BLD AUTO: 33 CELLS/UL (ref 0–200)
BASOPHILS NFR BLD AUTO: 0.5 %
BILIRUB SERPL-MCNC: 0.7 MG/DL (ref 0.2–1.2)
BUN SERPL-MCNC: 17 MG/DL (ref 7–25)
BUN/CREAT SERPL: ABNORMAL (CALC) (ref 6–22)
CALCIUM SERPL-MCNC: 9.3 MG/DL (ref 8.6–10.4)
CHLORIDE SERPL-SCNC: 104 MMOL/L (ref 98–110)
CHOLEST SERPL-MCNC: 218 MG/DL
CHOLEST/HDLC SERPL: 3.1 (CALC)
CO2 SERPL-SCNC: 30 MMOL/L (ref 20–32)
CREAT SERPL-MCNC: 0.72 MG/DL (ref 0.5–1.05)
EOSINOPHIL # BLD AUTO: 231 CELLS/UL (ref 15–500)
EOSINOPHIL NFR BLD AUTO: 3.5 %
ERYTHROCYTE [DISTWIDTH] IN BLOOD BY AUTOMATED COUNT: 12.4 % (ref 11–15)
GFR/BSA.PRED SERPLBLD CYS-BASED-ARV: 94 ML/MIN/1.73M2
GLOBULIN SER CALC-MCNC: 2.8 G/DL (CALC) (ref 1.9–3.7)
GLUCOSE SERPL-MCNC: 106 MG/DL (ref 65–99)
HCT VFR BLD AUTO: 43.6 % (ref 35–45)
HDLC SERPL-MCNC: 71 MG/DL
HGB BLD-MCNC: 14.7 G/DL (ref 11.7–15.5)
LDLC SERPL CALC-MCNC: 124 MG/DL (CALC)
LYMPHOCYTES # BLD AUTO: 2026 CELLS/UL (ref 850–3900)
LYMPHOCYTES NFR BLD AUTO: 30.7 %
MCH RBC QN AUTO: 29.9 PG (ref 27–33)
MCHC RBC AUTO-ENTMCNC: 33.7 G/DL (ref 32–36)
MCV RBC AUTO: 88.8 FL (ref 80–100)
MONOCYTES # BLD AUTO: 508 CELLS/UL (ref 200–950)
MONOCYTES NFR BLD AUTO: 7.7 %
NEUTROPHILS # BLD AUTO: 3802 CELLS/UL (ref 1500–7800)
NEUTROPHILS NFR BLD AUTO: 57.6 %
NONHDLC SERPL-MCNC: 147 MG/DL (CALC)
PLATELET # BLD AUTO: 257 THOUSAND/UL (ref 140–400)
PMV BLD REES-ECKER: 10.1 FL (ref 7.5–12.5)
POTASSIUM SERPL-SCNC: 4 MMOL/L (ref 3.5–5.3)
PROT SERPL-MCNC: 7.1 G/DL (ref 6.1–8.1)
RBC # BLD AUTO: 4.91 MILLION/UL (ref 3.8–5.1)
SODIUM SERPL-SCNC: 142 MMOL/L (ref 135–146)
TRIGL SERPL-MCNC: 123 MG/DL
TSH SERPL-ACNC: 3.16 MIU/L (ref 0.4–4.5)
VIT B12 SERPL-MCNC: 294 PG/ML (ref 200–1100)
WBC # BLD AUTO: 6.6 THOUSAND/UL (ref 3.8–10.8)

## 2024-05-30 DIAGNOSIS — E55.9 VITAMIN D DEFICIENCY: Primary | ICD-10-CM

## 2024-05-30 RX ORDER — ERGOCALCIFEROL 1.25 MG/1
50000 CAPSULE ORAL WEEKLY
Qty: 12 CAPSULE | Refills: 3 | Status: SHIPPED | OUTPATIENT
Start: 2024-05-30

## 2024-06-03 ENCOUNTER — HOSPITAL ENCOUNTER (OUTPATIENT)
Dept: NON INVASIVE DIAGNOSTICS | Facility: HOSPITAL | Age: 64
Discharge: HOME | End: 2024-06-03
Payer: COMMERCIAL

## 2024-06-03 DIAGNOSIS — Z86.718 HISTORY OF DEEP VEIN THROMBOSIS (DVT) OF LOWER EXTREMITY: ICD-10-CM

## 2024-06-03 PROCEDURE — 93926 LOWER EXTREMITY STUDY: CPT | Performed by: SURGERY

## 2024-06-03 PROCEDURE — 93926 LOWER EXTREMITY STUDY: CPT

## 2024-06-03 PROCEDURE — 93922 UPR/L XTREMITY ART 2 LEVELS: CPT | Performed by: SURGERY

## 2024-06-04 ENCOUNTER — TELEPHONE (OUTPATIENT)
Dept: FAMILY MEDICINE CLINIC | Facility: HOSPITAL | Age: 64
End: 2024-06-04

## 2024-06-04 NOTE — TELEPHONE ENCOUNTER
----- Message from RAISSA Russo sent at 5/30/2024  7:56 AM EDT -----  Blood glucose is elevated, would ask if Mrs. Vann was fasting?  Total cholesterol as well as LDL are elevated, HDL is protective.  Would ensure she is limiting processed foods, consider statin therapy.  Vitamin D is deficient- will start vitamin D2 50,000 IU weekly to treat.  We will recheck this level after 2-3 months of therapy.  Thyroid function is good, CBC shows no overt deficiency.

## 2024-06-05 ENCOUNTER — HOSPITAL ENCOUNTER (OUTPATIENT)
Dept: NON INVASIVE DIAGNOSTICS | Facility: HOSPITAL | Age: 64
Discharge: HOME | End: 2024-06-05
Attending: SURGERY
Payer: COMMERCIAL

## 2024-06-05 ENCOUNTER — ANESTHESIA (OUTPATIENT)
Dept: PERIOP | Facility: HOSPITAL | Age: 64
End: 2024-06-05
Payer: COMMERCIAL

## 2024-06-05 ENCOUNTER — HOSPITAL ENCOUNTER (OUTPATIENT)
Facility: HOSPITAL | Age: 64
Setting detail: OUTPATIENT SURGERY
Discharge: HOME | End: 2024-06-05
Attending: SURGERY | Admitting: SURGERY
Payer: COMMERCIAL

## 2024-06-05 VITALS
RESPIRATION RATE: 18 BRPM | BODY MASS INDEX: 38.51 KG/M2 | SYSTOLIC BLOOD PRESSURE: 166 MMHG | TEMPERATURE: 97.4 F | HEART RATE: 73 BPM | OXYGEN SATURATION: 98 % | DIASTOLIC BLOOD PRESSURE: 95 MMHG | WEIGHT: 210.54 LBS

## 2024-06-05 DIAGNOSIS — L97.919 CHRONIC VENOUS HYPERTENSION W ULCER OF R LOW EXTREM (HCC): ICD-10-CM

## 2024-06-05 DIAGNOSIS — L97.909 CHRONIC VENOUS HYPERTENSION W ULCERATION (HCC): Primary | ICD-10-CM

## 2024-06-05 DIAGNOSIS — I87.319 CHRONIC VENOUS HYPERTENSION W ULCERATION (HCC): Primary | ICD-10-CM

## 2024-06-05 DIAGNOSIS — I87.311 CHRONIC VENOUS HYPERTENSION W ULCER OF R LOW EXTREM (HCC): ICD-10-CM

## 2024-06-05 PROCEDURE — 36482 ENDOVEN THER CHEM ADHES 1ST: CPT | Performed by: SURGERY

## 2024-06-05 PROCEDURE — 93971 EXTREMITY STUDY: CPT

## 2024-06-05 PROCEDURE — NC001 PR NO CHARGE: Performed by: SURGERY

## 2024-06-05 PROCEDURE — C1888 ENDOVAS NON-CARDIAC ABL CATH: HCPCS | Performed by: SURGERY

## 2024-06-05 PROCEDURE — C1894 INTRO/SHEATH, NON-LASER: HCPCS | Performed by: SURGERY

## 2024-06-05 DEVICE — CLOSURE SYSTEM VS-404  VENASEAL US EA
Type: IMPLANTABLE DEVICE | Site: LEG | Status: FUNCTIONAL
Brand: VENASEAL™

## 2024-06-05 RX ORDER — EPHEDRINE SULFATE 50 MG/ML
INJECTION INTRAVENOUS AS NEEDED
Status: DISCONTINUED | OUTPATIENT
Start: 2024-06-05 | End: 2024-06-05

## 2024-06-05 RX ORDER — ONDANSETRON 2 MG/ML
INJECTION INTRAMUSCULAR; INTRAVENOUS AS NEEDED
Status: DISCONTINUED | OUTPATIENT
Start: 2024-06-05 | End: 2024-06-05

## 2024-06-05 RX ORDER — HYDROMORPHONE HCL/PF 1 MG/ML
SYRINGE (ML) INJECTION AS NEEDED
Status: DISCONTINUED | OUTPATIENT
Start: 2024-06-05 | End: 2024-06-05

## 2024-06-05 RX ORDER — FENTANYL CITRATE/PF 50 MCG/ML
25 SYRINGE (ML) INJECTION
Status: DISCONTINUED | OUTPATIENT
Start: 2024-06-05 | End: 2024-06-05 | Stop reason: HOSPADM

## 2024-06-05 RX ORDER — OXYCODONE HYDROCHLORIDE AND ACETAMINOPHEN 5; 325 MG/1; MG/1
1 TABLET ORAL EVERY 4 HOURS PRN
Status: DISCONTINUED | OUTPATIENT
Start: 2024-06-05 | End: 2024-06-05 | Stop reason: HOSPADM

## 2024-06-05 RX ORDER — MIDAZOLAM HYDROCHLORIDE 2 MG/2ML
INJECTION, SOLUTION INTRAMUSCULAR; INTRAVENOUS AS NEEDED
Status: DISCONTINUED | OUTPATIENT
Start: 2024-06-05 | End: 2024-06-05

## 2024-06-05 RX ORDER — PROPOFOL 10 MG/ML
INJECTION, EMULSION INTRAVENOUS AS NEEDED
Status: DISCONTINUED | OUTPATIENT
Start: 2024-06-05 | End: 2024-06-05

## 2024-06-05 RX ORDER — CEFAZOLIN SODIUM 2 G/50ML
2000 SOLUTION INTRAVENOUS ONCE
Status: COMPLETED | OUTPATIENT
Start: 2024-06-05 | End: 2024-06-05

## 2024-06-05 RX ORDER — MAGNESIUM HYDROXIDE 1200 MG/15ML
LIQUID ORAL AS NEEDED
Status: DISCONTINUED | OUTPATIENT
Start: 2024-06-05 | End: 2024-06-05 | Stop reason: HOSPADM

## 2024-06-05 RX ORDER — LIDOCAINE HYDROCHLORIDE 20 MG/ML
INJECTION, SOLUTION EPIDURAL; INFILTRATION; INTRACAUDAL; PERINEURAL AS NEEDED
Status: DISCONTINUED | OUTPATIENT
Start: 2024-06-05 | End: 2024-06-05

## 2024-06-05 RX ORDER — ACETAMINOPHEN 325 MG/1
650 TABLET ORAL EVERY 6 HOURS PRN
Status: DISCONTINUED | OUTPATIENT
Start: 2024-06-05 | End: 2024-06-05 | Stop reason: HOSPADM

## 2024-06-05 RX ORDER — DEXAMETHASONE SODIUM PHOSPHATE 10 MG/ML
INJECTION, SOLUTION INTRAMUSCULAR; INTRAVENOUS AS NEEDED
Status: DISCONTINUED | OUTPATIENT
Start: 2024-06-05 | End: 2024-06-05

## 2024-06-05 RX ORDER — SODIUM CHLORIDE, SODIUM LACTATE, POTASSIUM CHLORIDE, CALCIUM CHLORIDE 600; 310; 30; 20 MG/100ML; MG/100ML; MG/100ML; MG/100ML
INJECTION, SOLUTION INTRAVENOUS CONTINUOUS PRN
Status: DISCONTINUED | OUTPATIENT
Start: 2024-06-05 | End: 2024-06-05

## 2024-06-05 RX ORDER — SODIUM CHLORIDE 9 MG/ML
125 INJECTION, SOLUTION INTRAVENOUS CONTINUOUS
Status: DISCONTINUED | OUTPATIENT
Start: 2024-06-05 | End: 2024-06-05 | Stop reason: HOSPADM

## 2024-06-05 RX ORDER — OXYCODONE HYDROCHLORIDE AND ACETAMINOPHEN 5; 325 MG/1; MG/1
1 TABLET ORAL EVERY 8 HOURS PRN
Qty: 6 TABLET | Refills: 0 | Status: SHIPPED | OUTPATIENT
Start: 2024-06-05 | End: 2024-06-15

## 2024-06-05 RX ORDER — CHLORHEXIDINE GLUCONATE ORAL RINSE 1.2 MG/ML
15 SOLUTION DENTAL ONCE
Status: COMPLETED | OUTPATIENT
Start: 2024-06-05 | End: 2024-06-05

## 2024-06-05 RX ORDER — FENTANYL CITRATE 50 UG/ML
INJECTION, SOLUTION INTRAMUSCULAR; INTRAVENOUS AS NEEDED
Status: DISCONTINUED | OUTPATIENT
Start: 2024-06-05 | End: 2024-06-05

## 2024-06-05 RX ORDER — ONDANSETRON 2 MG/ML
4 INJECTION INTRAMUSCULAR; INTRAVENOUS ONCE AS NEEDED
Status: DISCONTINUED | OUTPATIENT
Start: 2024-06-05 | End: 2024-06-05 | Stop reason: HOSPADM

## 2024-06-05 RX ADMIN — FENTANYL CITRATE 50 MCG: 50 INJECTION INTRAMUSCULAR; INTRAVENOUS at 08:25

## 2024-06-05 RX ADMIN — HYDROMORPHONE HYDROCHLORIDE 0.5 MG: 0.5 INJECTION, SOLUTION INTRAMUSCULAR; INTRAVENOUS; SUBCUTANEOUS at 09:25

## 2024-06-05 RX ADMIN — SODIUM CHLORIDE, SODIUM LACTATE, POTASSIUM CHLORIDE, AND CALCIUM CHLORIDE: .6; .31; .03; .02 INJECTION, SOLUTION INTRAVENOUS at 08:14

## 2024-06-05 RX ADMIN — LIDOCAINE HYDROCHLORIDE 50 MG: 20 INJECTION, SOLUTION EPIDURAL; INFILTRATION; INTRACAUDAL at 08:20

## 2024-06-05 RX ADMIN — CEFAZOLIN SODIUM 2000 MG: 2 SOLUTION INTRAVENOUS at 08:14

## 2024-06-05 RX ADMIN — DEXAMETHASONE SODIUM PHOSPHATE 10 MG: 10 INJECTION INTRAMUSCULAR; INTRAVENOUS at 08:20

## 2024-06-05 RX ADMIN — SODIUM CHLORIDE, SODIUM LACTATE, POTASSIUM CHLORIDE, AND CALCIUM CHLORIDE: .6; .31; .03; .02 INJECTION, SOLUTION INTRAVENOUS at 08:19

## 2024-06-05 RX ADMIN — MIDAZOLAM 2 MG: 1 INJECTION INTRAMUSCULAR; INTRAVENOUS at 08:14

## 2024-06-05 RX ADMIN — FENTANYL CITRATE 25 MCG: 50 INJECTION INTRAMUSCULAR; INTRAVENOUS at 09:38

## 2024-06-05 RX ADMIN — SODIUM CHLORIDE 125 ML/HR: 0.9 INJECTION, SOLUTION INTRAVENOUS at 06:45

## 2024-06-05 RX ADMIN — FENTANYL CITRATE 25 MCG: 50 INJECTION INTRAMUSCULAR; INTRAVENOUS at 09:43

## 2024-06-05 RX ADMIN — EPHEDRINE SULFATE 5 MG: 50 INJECTION, SOLUTION INTRAVENOUS at 08:32

## 2024-06-05 RX ADMIN — PROPOFOL 200 MG: 10 INJECTION, EMULSION INTRAVENOUS at 08:20

## 2024-06-05 RX ADMIN — CHLORHEXIDINE GLUCONATE 15 ML: 1.2 RINSE ORAL at 06:46

## 2024-06-05 RX ADMIN — OXYCODONE HYDROCHLORIDE AND ACETAMINOPHEN 1 TABLET: 5; 325 TABLET ORAL at 10:26

## 2024-06-05 RX ADMIN — EPHEDRINE SULFATE 5 MG: 50 INJECTION, SOLUTION INTRAVENOUS at 08:53

## 2024-06-05 RX ADMIN — ONDANSETRON 4 MG: 2 INJECTION INTRAMUSCULAR; INTRAVENOUS at 08:28

## 2024-06-05 RX ADMIN — FENTANYL CITRATE 50 MCG: 50 INJECTION INTRAMUSCULAR; INTRAVENOUS at 08:50

## 2024-06-05 NOTE — ANESTHESIA PREPROCEDURE EVALUATION
Procedure:  EVLT left greater saphenous vein closure (VenaSeal versus EVLT). (Left: Leg Upper)    Relevant Problems   CARDIO   (+) Primary hypertension      Orthopedic/Musculoskeletal   (+) Cellulitis of right anterior lower leg        Physical Exam    Airway    Mallampati score: III         Dental       Cardiovascular  Rhythm: regular, Rate: normal    Pulmonary   Breath sounds clear to auscultation    Other Findings  post-pubertal.      Anesthesia Plan  ASA Score- 2     Anesthesia Type- general with ASA Monitors.         Additional Monitors:     Airway Plan:            Plan Factors-Exercise tolerance (METS): >4 METS.    Chart reviewed.   Existing labs reviewed. Patient summary reviewed.    Patient is not a current smoker.      Obstructive sleep apnea risk education given perioperatively.        Induction- intravenous.    Postoperative Plan-     Perioperative Resuscitation Plan - Level 1 - Full Code.       Informed Consent- Anesthetic plan and risks discussed with patient.

## 2024-06-05 NOTE — OP NOTE
OPERATIVE REPORT  PATIENT NAME: Jessi Vann    :  1960  MRN: [id number]  Pt Location: [facility] OR ROOM 07    SURGERY DATE: 2024    Surgeons and Role:     * David Celaya,  - Primary     * Mp Morse DPM - Assisting    Preop Diagnosis:  Chronic venous hypertension w ulceration (HCC) [I87.319, L97.909]    Post-Op Diagnosis Codes:     * Chronic venous hypertension w ulceration (HCC) [I87.319, L97.909]    Procedure(s):  Left - left greater saphenous vein closure with VenaSeal    Specimen(s):  * No specimens in log *    Estimated Blood Loss:   Minimal    Drains:  * No LDAs found *    Anesthesia Type:   General    Operative Indications:  Chronic venous hypertension w ulceration (HCC) [I87.319, L97.909]  Jessi is a pleasant 63-year-old woman who presented to the office with a nonhealing left lower leg venous ulceration.  Despite local wound/compression therapy her wound has persisted.  Her venous reflux study suggested superficial venous incompetence extending from the distal calf to the proximal thigh.  Superficial venous closure was recommended to assist with wound healing.  The procedure, risk, benefits, alternatives, as well as anticipated postoperative course were discussed in detail.  Patient was agreeable to proceed.  Written consent was obtained.  Patient is brought to the operating room for the above-mentioned procedure.    Operative Findings:  Successful closure of L GSV. Treated segment of vein approximately 56cm extending from distal calf to proximal thigh.  Following venaseal closure the Saphenofemoral junction was patent with color flow. The CFV was easily compressible.      Complications:   None immediately apparent    Procedure and Technique:  The patient was properly identified in the preop holding area.  The patient's name, laterality, nature procedure verified.  The operative site was marked.  Patient was brought to the operating room and she was positioned supine on the OR table.  After  adequate duction of general anesthesia the left lower extremity circumferentially prepped using chlorhexidine and draped in usual sterile fashion.  A preoperative dose of antibiotics was administered.  A formal timeout was performed.  The ultrasound was used to map the greater saphenous vein from the medial malleolus to the saphenofemoral junction.  Under ultrasound guidance the great saphenous vein was accessed using a micropuncture needle just distal to the existing venous ulceration.  A microwire was advanced through the needle.  The needle was removed over the wire and a microsheath inserted.  Next, an 035 guidewire was advanced through the sheath centrally into the common femoral vein on ultrasound guidance.  Micro sheath was removed and the blue venous seal introducer sheath was placed over the wire with its tip positioned at the saphenofemoral junction.  On the back table the VenaSeal glue catheter was primed according to instructions for use and under the guidance of the venous seal wrap.  Next on ultrasound guidance the blue introducer sheath was withdrawn 10 cm.  The glue catheter was next inserted through the blue introducer sheath and connected to the sheath.  Under ultrasound guidance the tip of the catheter was identified and the transducer moved just proximal to this site.  Compression was carried out with the ultrasound introducer while an aliquot of VenaSeal glue was applied.  After the second aliquot of VenaSeal glue was applied manual compression was held x 3 minutes.  Following the 3 minutes of compression a series of VenaSeal VenaSeal glue aliquot application was carried out at every 3 cm with intervening manual compression x 30 seconds.  A total length of 56 cm of greater saphenous vein was treated.  Once the final application of VenaSeal glue was administered the glue catheter was detached from the blue introducer sheath and withdrawn through the sheath followed by removal of the blue  introducer sheath.  Digital compression was held over the access site with good hemostasis.  The access site was dressed using gauze and a Geni.  The patient tolerated procedure well.  She was awakened, extubated and transferred to recovery room in stable condition.   I was present for the entire procedure.    Patient Disposition:  PACU         SIGNATURE: David Celaya DO  DATE: June 5, 2024  TIME: 9:29 AM

## 2024-06-05 NOTE — DISCHARGE INSTR - AVS FIRST PAGE
DISCHARGE INSTRUCTIONS  VARICOSE VEIN SURGERY    ACTIVITY:  On the day of your operation, “take it easy”. You can take short walks around the house. When sitting, the leg should be elevated. The preferred position is to have the leg at or above the level of the heart. Starting on the first day after surgery, light walking is encouraged as tolerated. After your ultrasound test, you can resume your normal activity, but no heavy lifting (do not lift more than 15 pounds) or strenuous exercise for 2 weeks.   You should not drive a car until your bandages are removed and you are off all narcotic pain medication.  You may ride in a car.      DIET: Resume your normal diet.  Good nutrition is important for healing of your incision.    DRESSINGS/SURGICAL SITE:  When released from the hospital, you should have a compression bandage in place on the operated leg.  This bandage should feel snug, but not too tight.  If the bandage becomes blood soaked or painfully tight, elevate your leg and call the office ([phone])  You may have surgical glue on your incision sites.   There are stitches present under the skin which will absorb on their own.   The glue is used to cover the incision, assist in closure, and prevent contamination. This adhesive will darken and peel away on its own within one to two weeks. Do not pick at it.    You should shower daily.  Wash incisions daily with soap and water, but do not rub or scrub the incisions; rinse thoroughly and pat dry.      If the operated leg becomes increasingly painful or swollen, or if there is increasing redness or pain around your incision, contact our office.  Some bruising of the skin is common after varicose vein surgery.  This can be lessened by elevation of the leg. Many patients will notice some numbness of the shin, ankle, calf, or the top of the foot. This usually improves with time but may be persistent.  After surgery you can expect bruising, swelling and hard knots on  your leg.  As your body heals the bruising will fade and the swelling and knots will subside.  Apply sunscreen with SPF 30 to incisions while sun bathing for up to one year after surgery to reduce the chances of your incisions darkening.    FOLLOW UP STUDIES:  Your first post-operative appointment will be 2-3 days after your surgery. At this appointment your bandages will be removed, and you will be seen by a Vascular Surgeon, Nurse Practitioner, or Physician Assistant. An appointment for a follow up Doppler ultrasound study will be scheduled on the same day as your follow up appointment.     FOLLOW UP APPOINTMENTS:  Making and keeping follow up appointments and ultrasound tests are important to your recovery.  If you have difficulty making it to or keeping your follow up appointments, call the office.    If you have increased pain, fever >101.5, increased drainage, redness or a bad smell at your surgery site, new coldness/numbness of your arm or leg, please call us immediately and GO directly to the ER.    PLEASE CALL THE OFFICE IF YOU HAVE ANY QUESTIONS  [phone]  [phone] [address], [address], [place] [address]  [address] [facility], [address], [place] [address]  [address] [place] [address]  [address] [facility] [address], [place] [address]  [address] [facility], [place] [address]  [address] [facility], [address]nd Floor, [address], [place] [address]  [address] [facility], [address], [place] [address]  [address] [facility], [address]st Floor, [address], [place] [address]  [address], [place] [address]  [address] [address]

## 2024-06-05 NOTE — ANESTHESIA POSTPROCEDURE EVALUATION
Post-Op Assessment Note    CV Status:  Stable  Pain Score: 0    Pain management: adequate       Mental Status:  Alert and awake   Hydration Status:  Euvolemic   PONV Controlled:  Controlled   Airway Patency:  Patent     Post Op Vitals Reviewed: Yes    No anethesia notable event occurred.    Staff: CRNA               /88 (06/05/24 0930)    Temp (!) 97 °F (36.1 °C) (06/05/24 0930)    Pulse 83 (06/05/24 0930)   Resp 20 (06/05/24 0930)    SpO2 100 % (06/05/24 0930)

## 2024-06-07 ENCOUNTER — OFFICE VISIT (OUTPATIENT)
Dept: VASCULAR SURGERY | Facility: CLINIC | Age: 64
End: 2024-06-07
Payer: COMMERCIAL

## 2024-06-07 ENCOUNTER — HOSPITAL ENCOUNTER (OUTPATIENT)
Dept: NON INVASIVE DIAGNOSTICS | Facility: CLINIC | Age: 64
Discharge: HOME | End: 2024-06-07
Payer: COMMERCIAL

## 2024-06-07 VITALS
WEIGHT: 211 LBS | SYSTOLIC BLOOD PRESSURE: 142 MMHG | BODY MASS INDEX: 36.02 KG/M2 | HEART RATE: 58 BPM | OXYGEN SATURATION: 100 % | HEIGHT: 64 IN | DIASTOLIC BLOOD PRESSURE: 86 MMHG

## 2024-06-07 DIAGNOSIS — I87.311 CHRONIC VENOUS HYPERTENSION (IDIOPATHIC) WITH ULCER OF RIGHT LOWER EXTREMITY (HCC): ICD-10-CM

## 2024-06-07 DIAGNOSIS — I87.319 CHRONIC VENOUS HYPERTENSION W ULCERATION (HCC): Primary | ICD-10-CM

## 2024-06-07 DIAGNOSIS — L97.909 CHRONIC VENOUS HYPERTENSION W ULCERATION (HCC): Primary | ICD-10-CM

## 2024-06-07 DIAGNOSIS — L97.919 CHRONIC VENOUS HYPERTENSION (IDIOPATHIC) WITH ULCER OF RIGHT LOWER EXTREMITY (HCC): ICD-10-CM

## 2024-06-07 PROCEDURE — 93971 EXTREMITY STUDY: CPT

## 2024-06-07 PROCEDURE — 93971 EXTREMITY STUDY: CPT | Performed by: SURGERY

## 2024-06-07 PROCEDURE — 99213 OFFICE O/P EST LOW 20 MIN: CPT

## 2024-06-07 NOTE — PROGRESS NOTES
Ambulatory Visit  Name: Jessi Vann      : 1960      MRN: [id number]  Encounter Provider: RAISSA Reese  Encounter Date: 2024   Encounter department: [facility] [place]    Assessment & Plan   1. Chronic venous hypertension w ulceration (HCC)  Assessment & Plan:  Patient is reporting increasing pain localized to the area of her venous ulceration following her VenaSeal procedure.  She is denying any new numbness/tingling, color/temperature change, or significant swelling to her left lower extremity.  Patient is denying any fever, chills, or significant drainage from left ankle puncture site.  She presented to the office today with compression stockings in place.  She has been ambulating as tolerated.    Left medial ankle puncture site is scabbed over without redness, warmth, or drainage.  Left medial ankle ulceration with scant yellow fibrinous slough noted to wound base and periwound redness and irritation.  No significant drainage noted from venous ulceration.  Triple antibiotic ointment applied to wound bed, covered with Adaptic and gauze.    Plan:  -Patient informed that she may shower and wash her left lower extremity gently with soap and water.  -Informed patient to continue leg elevation and compression stocking use.  -Patient questioned if Unna boot should be reapplied at this time, however informed patient that we will not be reapplying Unna boot in the office today so as to be able to monitor VenaSeal injection site closely for any postop complications.  -Patient may resume activity as tolerated. Instructed patient to utilize Tylenol as needed for pain.  -Informed patient to notify the office if she develops any worsening pain, edema, warmth, redness, fever, chills, or purulent drainage from incision site.  -Patient will proceed to EVLT duplex immediately after this appointment.   -Patient will follow-up with Dr. Carlin on 2024 and at that time can reevaluate if Unna  boot therapy should be continued  -Follow up in the office in 6 weeks with Dr. Celaya        History of Present Illness     Jessi Vann is a 63 y.o. female, non-smoker, with PMH HTN, and chronic venous hypertension with poorly healing left lower extremity ulceration.  Patient is presenting to the vascular surgery office for initial postop evaluation following left GSV VenaSeal 6/5/2024 ([facility]).    Patient is reporting increasing pain localized to the area of her venous ulceration following her VenaSeal procedure.  She is denying any new numbness/tingling, color/temperature change, or significant swelling to her left lower extremity.  Patient is denying any fever, chills, or significant drainage from left ankle puncture site.  She presented to the office today with compression stockings in place.  She has been ambulating as tolerated.    Review of Systems   Constitutional: Negative.  Negative for chills and fever.   HENT: Negative.     Eyes: Negative.    Respiratory: Negative.     Musculoskeletal:         Left leg pain   Skin:  Positive for wound.   Allergic/Immunologic: Negative.    Neurological: Negative.  Negative for numbness.   Hematological: Negative.    Psychiatric/Behavioral: Negative.       Pertinent Medical History   Past Medical History:   Diagnosis Date    Blood clot in vein 1999    RLE, post partum    COVID-19 12/2021    Hypertension     Ulcer of right shin (HCC)      Medical History Reviewed by provider this encounter:       Current Outpatient Medications on File Prior to Visit   Medication Sig Dispense Refill    Acetaminophen 325 MG CAPS Take 3 capsules (975 mg total) by mouth every 8 (eight) hours as needed (mild pain) 30 capsule 0    betamethasone dipropionate (DIPROSONE) 0.05 % cream Apply topically as needed (for redness) 30 g 0    fluocinolone (SYNALAR) 0.01 % external solution Apply topically 2 (two) times a day Apply to area of inflammation twice daily as needed for flareups/burning.  Do  "not use for more than 7 days at a time.  Not for daily use 60 mL 1    oxyCODONE-acetaminophen (Percocet) 5-325 mg per tablet Take 1 tablet by mouth every 8 (eight) hours as needed for severe pain for up to 10 days Max Daily Amount: 3 tablets 6 tablet 0    ergocalciferol (VITAMIN D2) 50,000 units Take 1 capsule (50,000 Units total) by mouth once a week (Patient not taking: Reported on 2024) 12 capsule 3     No current facility-administered medications on file prior to visit.      Social History     Tobacco Use    Smoking status: Never    Smokeless tobacco: Never   Vaping Use    Vaping status: Never Used   Substance and Sexual Activity    Alcohol use: Not Currently    Drug use: Never    Sexual activity: Not Currently     Objective     /86 (BP Location: Left arm, Patient Position: Sitting, Cuff Size: Large)   Pulse 58   Ht 5' 4\" (1.626 m)   Wt 95.7 kg (211 lb)   SpO2 100%   BMI 36.22 kg/m²     Physical Exam  Vitals and nursing note reviewed.   Constitutional:       General: She is not in acute distress.     Appearance: Normal appearance. She is well-developed.   HENT:      Head: Normocephalic and atraumatic.   Eyes:      Conjunctiva/sclera: Conjunctivae normal.   Cardiovascular:      Rate and Rhythm: Normal rate and regular rhythm.      Pulses:           Dorsalis pedis pulses are 2+ on the left side.        Posterior tibial pulses are 2+ on the left side.   Pulmonary:      Effort: Pulmonary effort is normal. No respiratory distress.   Musculoskeletal:         General: No swelling or tenderness.      Cervical back: Neck supple.      Right lower le+ Edema present.      Left lower le+ Edema present.   Skin:     General: Skin is warm and dry.      Capillary Refill: Capillary refill takes less than 2 seconds.      Findings: Wound present. No lesion or rash.   Neurological:      General: No focal deficit present.      Mental Status: She is alert and oriented to person, place, and time.   Psychiatric:   "       Mood and Affect: Mood normal.         Behavior: Behavior normal.       Left medial ankle ulcer and VenaSeal puncture site    Administrative Statements   I have spent a total time of 15 minutes on 06/07/24 In caring for this patient including Risks and benefits of tx options, Instructions for management, Patient and family education, Importance of tx compliance, Counseling / Coordination of care, Documenting in the medical record, Reviewing / ordering tests, medicine, procedures  , and Obtaining or reviewing history  .

## 2024-06-07 NOTE — PROGRESS NOTES
Ambulatory Visit  Name: Jessi Vann      : 1960      MRN: [id number]  Encounter Provider: RAISSA Reese  Encounter Date: 2024   Encounter department: [facility]    Assessment & Plan   {There are no diagnoses linked to this encounter. (Refresh or delete this SmartLink)}    History of Present Illness   {Disappearing Hyperlinks I Encounters * My Last Note * Since Last Visit * History :42926}  Jessi Vann is a 63 y.o. female,    Review of Systems  {Select to Display PMH (Optional):20033}  Objective   {Disappearing Hyperlinks   Review Vitals * Enter New Vitals * Results Review * Labs * Imaging * Cardiology * Procedures * Lung Cancer Screening :79980}  There were no vitals taken for this visit.    Physical Exam  Administrative Statements {Disappearing Hyperlinks I  Level of Service * PCMH/PCSP:77170}  {Time Spent Statement (Optional):06378}

## 2024-06-07 NOTE — ASSESSMENT & PLAN NOTE
Patient is reporting increasing pain localized to the area of her venous ulceration following her VenaSeal procedure.  She is denying any new numbness/tingling, color/temperature change, or significant swelling to her left lower extremity.  Patient is denying any fever, chills, or significant drainage from left ankle puncture site.  She presented to the office today with compression stockings in place.  She has been ambulating as tolerated.    Left medial ankle puncture site is scabbed over without redness, warmth, or drainage.  Left medial ankle ulceration with scant yellow fibrinous slough noted to wound base and periwound redness and irritation.  No significant drainage noted from venous ulceration.  Triple antibiotic ointment applied to wound bed, covered with Adaptic and gauze.    Plan:  -Patient informed that she may shower and wash her left lower extremity gently with soap and water.  -Informed patient to continue leg elevation and compression stocking use.  -Patient questioned if Unna boot should be reapplied at this time, however informed patient that we will not be reapplying Unna boot in the office today so as to be able to monitor VenaSeal injection site closely for any postop complications.  -Patient may resume activity as tolerated. Instructed patient to utilize Tylenol as needed for pain.  -Informed patient to notify the office if she develops any worsening pain, edema, warmth, redness, fever, chills, or purulent drainage from incision site.  -Patient will proceed to EVLT duplex immediately after this appointment.   -Patient will follow-up with Dr. Carlin on 6/11/2024 and at that time can reevaluate if Unna boot therapy should be continued  -Follow up in the office in 6 weeks with Dr. Celaya

## 2024-06-11 ENCOUNTER — TELEPHONE (OUTPATIENT)
Dept: VASCULAR SURGERY | Facility: CLINIC | Age: 64
End: 2024-06-11

## 2024-06-11 ENCOUNTER — TELEPHONE (OUTPATIENT)
Dept: CARDIOLOGY CLINIC | Facility: CLINIC | Age: 64
End: 2024-06-11

## 2024-06-11 NOTE — TELEPHONE ENCOUNTER
Pt and daughter came in today 6/11/24, was unaware of appt being canceled, they just saw [facility] 6/7/24. They wanted to discuss a new wound care provider, they are having issues with their current provider, discussed canceling wound care appt for today. Also wanted to discuss compression.  Advised Jessi DIETRICH on all of this and I let the pt and daughter know we will have someone reach out to them to discuss the wound care situation.

## 2024-06-11 NOTE — TELEPHONE ENCOUNTER
6/7/24 at 1:17pm, I left a message and canceled pt's appointment because she had an OV with [facility] 6/7/24.  I discussed cancelling appointment with Jessi DIETRICH Because of the very recent OV, she agreed.  Daughter and pt came in today, said they did not receive a voicemail and said they wanted to schedule a new appointment and to discuss changing wound care providers and discussion of compression. I advised Jessi DIETRICH On the matter and let them know that we/one of the nurses can give them a call back, and discuss wound care.

## 2024-06-11 NOTE — TELEPHONE ENCOUNTER
Attempted to reach out to patient via phone, no answer.  I would advise that patient keep her appointments with wound care as scheduled as she was extremely concerned regarding reapplication of Unna boot at her office visit.  We kept the Unna boot off so as to be able to easily assess VenaSeal injection site and patient was instructed to resume knee high compression stockings.  As the wound care center has been managing patient's wound for several months, I would recommend they continue to manage wound care. Please inquire what questions Jessi has regarding compression.

## 2024-06-12 ENCOUNTER — TELEPHONE (OUTPATIENT)
Dept: VASCULAR SURGERY | Facility: CLINIC | Age: 64
End: 2024-06-12

## 2024-06-12 NOTE — TELEPHONE ENCOUNTER
Patients daughter Alma called. Patient had received a call yesterday that RAISSA Oconnell and RAISSA Soares had both recommended that patient continue with wound care as she has been going there for several months .  The daughter , who is a physical therapist in [facility] [employer] called to explain , that when her mom is not feeling well , she can be mean .  Apparently, she did not mesh well with Dr. Carlin at wound care and does not want to go back.  Per daughter, she is willing to take her mom to [facility] or [place] if Roxy could recommend a providor that she thinks could work well with her mom .  She was very polite and said she knows her mom can be difficult , but she knows she needs the wound care to heal wound.  I told her Roxy was off today, but I would send her a message for tomorrow to see if there is someone she thinks could work with patient to continue therapy.

## 2024-06-13 NOTE — TELEPHONE ENCOUNTER
Spoke with patient's daughter Lois and informed her of information from RAISSA Etienne.  She stated that she will contact [facility] as recommended by Roxy.  Contact number provided.  Informed her to reach out to office with any further questions or any change in wound or symptoms.

## 2024-06-13 NOTE — TELEPHONE ENCOUNTER
Spoke with patient's daughter Lois and informed her of information from RAISSA Soares.  Patient has been wearing her compression stockings daily without any issues.      Please see telephone note from 6/12/24 for more information in regards to wound care.

## 2024-06-13 NOTE — TELEPHONE ENCOUNTER
I do think she should continue at the wound care center to heal the wound completely. Dr Carlin is very good. If there is a personality conflict she can try another provider that is available - the sooner the better. She can see if she can get an appointment w/ Dr Roa in [place] wound care [place]

## 2024-06-20 ENCOUNTER — OFFICE VISIT (OUTPATIENT)
Dept: GYNECOLOGY | Facility: CLINIC | Age: 64
End: 2024-06-20
Payer: COMMERCIAL

## 2024-06-20 VITALS
WEIGHT: 208.8 LBS | BODY MASS INDEX: 35.65 KG/M2 | DIASTOLIC BLOOD PRESSURE: 88 MMHG | SYSTOLIC BLOOD PRESSURE: 148 MMHG | HEIGHT: 64 IN

## 2024-06-20 DIAGNOSIS — I87.311 CHRONIC VENOUS HYPERTENSION (IDIOPATHIC) WITH ULCER OF RIGHT LOWER EXTREMITY (HCC): ICD-10-CM

## 2024-06-20 DIAGNOSIS — L97.919 CHRONIC VENOUS HYPERTENSION (IDIOPATHIC) WITH ULCER OF RIGHT LOWER EXTREMITY (HCC): ICD-10-CM

## 2024-06-20 DIAGNOSIS — Z86.718 HISTORY OF DVT OF LOWER EXTREMITY: ICD-10-CM

## 2024-06-20 DIAGNOSIS — Z64.1 GRAND MULTIPARITY: ICD-10-CM

## 2024-06-20 DIAGNOSIS — N81.3 PROCIDENTIA OF UTERUS: Primary | ICD-10-CM

## 2024-06-20 DIAGNOSIS — I10 PRIMARY HYPERTENSION: ICD-10-CM

## 2024-06-20 DIAGNOSIS — N81.4 PROLAPSED UTERUS: ICD-10-CM

## 2024-06-20 DIAGNOSIS — E66.9 OBESITY (BMI 35.0-39.9 WITHOUT COMORBIDITY): ICD-10-CM

## 2024-06-20 PROCEDURE — 99203 OFFICE O/P NEW LOW 30 MIN: CPT | Performed by: OBSTETRICS & GYNECOLOGY

## 2024-06-20 PROCEDURE — A4562 PESSARY, NON RUBBER,ANY TYPE: HCPCS | Performed by: OBSTETRICS & GYNECOLOGY

## 2024-06-20 NOTE — PROGRESS NOTES
"Assessment/Plan:    Diagnoses and all orders for this visit:    Procidentia of uterus    Prolapsed uterus  -     Ambulatory Referral to Obstetrics / Gynecology    Obesity (BMI 35.0-39.9 without comorbidity)    Primary hypertension    History of DVT of lower extremity    Chronic venous hypertension (idiopathic) with ulcer of right lower extremity (HCC)    Grand multiparity        Social history self-employed baker, , she does not smoke, she does not drink alcohol or do illicit drugs.    Subjective: Uterine prolapse     Patient ID: Jessi Vann is a 63 y.o. female.    HPI  63-year-old female, asymptomatic menopausal state.  Grand multiparity, G7, P7 all vaginal births.  She is still occasionally sexually active.  History of uterine prolapse which has recently gotten progressively worse.  Now on exam she has procidentia which can be reduced without any difficulty.  Offered and recommended trial of pessary patient agreed.  We were able to place a #6 Gellhorn pessary without difficulty.  Risks and benefits reviewed with the patient increased bladder leakage possible bleeding symptoms or discharge.  Follow-up within the next 2 to 3 weeks for initiation of routine pessary maintenance.    Review of Systems   Respiratory: Negative.     Cardiovascular: Negative.    Gastrointestinal: Negative.    Genitourinary:         Procidentia   Neurological: Negative.    Psychiatric/Behavioral: Negative.         Objective: No acute distress  /88 (BP Location: Right arm, Patient Position: Sitting, Cuff Size: Standard)   Ht 5' 4\" (1.626 m)   Wt 94.7 kg (208 lb 12.8 oz)   BMI 35.84 kg/m²      Physical Exam  Vitals and nursing note reviewed. Exam conducted with a chaperone present.   Constitutional:       Appearance: Normal appearance. She is obese.   HENT:      Head: Normocephalic.   Eyes:      Pupils: Pupils are equal, round, and reactive to light.   Pulmonary:      Effort: Pulmonary effort is normal.   Abdominal:      " General: Abdomen is flat.      Palpations: Abdomen is soft.   Genitourinary:     General: Normal vulva.      Comments: Normal external genitalia  Normal vaginal discharge  Normal size uterus, complete procidentia, reduced without difficulty  6 Gellhorn pessary placed.  Patient able to retain desires to keep for now.  Normal cervix, several areas of erosive type changes, no active bleeding noted  No CMT  No pelvic masses  Musculoskeletal:         General: Swelling present. Normal range of motion.      Cervical back: Normal range of motion.      Comments: Insufficiency with bilateral leg edema, without ulceration  Both lower extremities wrapped ankle to knee   Skin:     General: Skin is warm and dry.   Neurological:      Mental Status: She is oriented to person, place, and time.   Psychiatric:         Mood and Affect: Mood normal.         Behavior: Behavior normal.         Thought Content: Thought content normal.         Judgment: Judgment normal.        Please note    In addition to the time spent discussing the findings and results of today's visit and exam, I spent approximately 30 minutes of face-to-face time with the patient, greater than 50% of which was spent in counseling and coordination of care for this patient.

## 2024-06-21 ENCOUNTER — RA CDI HCC (OUTPATIENT)
Dept: OTHER | Facility: HOSPITAL | Age: 64
End: 2024-06-21

## 2024-06-21 ENCOUNTER — TELEPHONE (OUTPATIENT)
Age: 64
End: 2024-06-21

## 2024-06-21 NOTE — TELEPHONE ENCOUNTER
Patient called reporting her pessary that was placed yesterday, came out this morning. She had some spotting afterwards.  She would like to know if she should have it put back in.

## 2024-06-21 NOTE — TELEPHONE ENCOUNTER
Called patient, left non-detailed message to return call.    Please advise patient per Dr. Riedel:  I am out of town. Just have her rinse it off and put in a baggie and bring to office.   We'll try a new one. Next Thursday in [place] office.      The office will need to schedule as no open appointment for us to be able to schedule.

## 2024-06-22 NOTE — TELEPHONE ENCOUNTER
Please call and schedule follow-up appointment for Thursday this week in [place] if available, or schedule in the [place] office if she does not mind the drive.

## 2024-06-24 ENCOUNTER — OFFICE VISIT (OUTPATIENT)
Dept: WOUND CARE | Facility: HOSPITAL | Age: 64
End: 2024-06-24
Payer: COMMERCIAL

## 2024-06-24 VITALS
TEMPERATURE: 97 F | RESPIRATION RATE: 16 BRPM | HEART RATE: 62 BPM | DIASTOLIC BLOOD PRESSURE: 84 MMHG | SYSTOLIC BLOOD PRESSURE: 140 MMHG

## 2024-06-24 DIAGNOSIS — I87.312 IDIOPATHIC CHRONIC VENOUS HYPERTENSION OF LEFT LOWER EXTREMITY WITH ULCER (HCC): Primary | ICD-10-CM

## 2024-06-24 DIAGNOSIS — L97.922 NON-PRESSURE CHRONIC ULCER OF LEFT LOWER LEG WITH FAT LAYER EXPOSED (HCC): ICD-10-CM

## 2024-06-24 DIAGNOSIS — L97.929 IDIOPATHIC CHRONIC VENOUS HYPERTENSION OF LEFT LOWER EXTREMITY WITH ULCER (HCC): Primary | ICD-10-CM

## 2024-06-24 PROCEDURE — 99213 OFFICE O/P EST LOW 20 MIN: CPT | Performed by: PODIATRIST

## 2024-06-24 PROCEDURE — 99212 OFFICE O/P EST SF 10 MIN: CPT | Performed by: PODIATRIST

## 2024-06-24 NOTE — PROGRESS NOTES
Wound Procedure Treatment Venous Ulcer Left;Medial Leg    Performed by: Beckie Weathers RN  Authorized by: Hai Roa DPM    Associated wounds:   Wound 03/26/24 Venous Ulcer Leg Left;Medial  Comments:  Healed today.

## 2024-06-24 NOTE — PATIENT INSTRUCTIONS
Orders Placed This Encounter   Procedures    Wound cleansing and dressings Venous Ulcer Left;Medial Leg     Left lower leg    Wound is healed, no dressing needed, continue to wear compression stockings daily.     Standing Status:   Future     Standing Expiration Date:   7/1/2024

## 2024-06-27 ENCOUNTER — OFFICE VISIT (OUTPATIENT)
Dept: FAMILY MEDICINE CLINIC | Facility: HOSPITAL | Age: 64
End: 2024-06-27
Payer: COMMERCIAL

## 2024-06-27 ENCOUNTER — OFFICE VISIT (OUTPATIENT)
Dept: GYNECOLOGY | Facility: CLINIC | Age: 64
End: 2024-06-27
Payer: COMMERCIAL

## 2024-06-27 VITALS — WEIGHT: 209.4 LBS | DIASTOLIC BLOOD PRESSURE: 82 MMHG | SYSTOLIC BLOOD PRESSURE: 136 MMHG | BODY MASS INDEX: 39.57 KG/M2

## 2024-06-27 VITALS
OXYGEN SATURATION: 98 % | SYSTOLIC BLOOD PRESSURE: 120 MMHG | BODY MASS INDEX: 39.27 KG/M2 | WEIGHT: 208 LBS | HEART RATE: 68 BPM | DIASTOLIC BLOOD PRESSURE: 84 MMHG | HEIGHT: 61 IN

## 2024-06-27 DIAGNOSIS — N81.4 PROLAPSED UTERUS: ICD-10-CM

## 2024-06-27 DIAGNOSIS — E66.09 CLASS 2 OBESITY DUE TO EXCESS CALORIES WITHOUT SERIOUS COMORBIDITY WITH BODY MASS INDEX (BMI) OF 39.0 TO 39.9 IN ADULT: ICD-10-CM

## 2024-06-27 DIAGNOSIS — Z00.00 WELL ADULT EXAM: Primary | ICD-10-CM

## 2024-06-27 DIAGNOSIS — E78.00 HYPERCHOLESTEROLEMIA: ICD-10-CM

## 2024-06-27 DIAGNOSIS — N81.3 UTERINE PROCIDENTIA: Primary | ICD-10-CM

## 2024-06-27 DIAGNOSIS — R73.03 PREDIABETES: ICD-10-CM

## 2024-06-27 DIAGNOSIS — Z46.89 ENCOUNTER FOR PESSARY MAINTENANCE: ICD-10-CM

## 2024-06-27 DIAGNOSIS — E55.9 VITAMIN D DEFICIENCY: ICD-10-CM

## 2024-06-27 DIAGNOSIS — Z12.11 SCREENING FOR COLON CANCER: ICD-10-CM

## 2024-06-27 DIAGNOSIS — R73.01 IMPAIRED FASTING GLUCOSE: ICD-10-CM

## 2024-06-27 PROBLEM — E66.812 CLASS 2 OBESITY DUE TO EXCESS CALORIES WITHOUT SERIOUS COMORBIDITY WITH BODY MASS INDEX (BMI) OF 39.0 TO 39.9 IN ADULT: Status: ACTIVE | Noted: 2024-06-27

## 2024-06-27 PROBLEM — I10 PRIMARY HYPERTENSION: Status: RESOLVED | Noted: 2021-12-22 | Resolved: 2024-06-27

## 2024-06-27 PROBLEM — E87.6 HYPOKALEMIA: Status: RESOLVED | Noted: 2022-01-11 | Resolved: 2024-06-27

## 2024-06-27 LAB — SL AMB POCT HEMOGLOBIN AIC: 5.8 (ref ?–6.5)

## 2024-06-27 PROCEDURE — 99396 PREV VISIT EST AGE 40-64: CPT | Performed by: NURSE PRACTITIONER

## 2024-06-27 PROCEDURE — 57160 INSERT PESSARY/OTHER DEVICE: CPT | Performed by: OBSTETRICS & GYNECOLOGY

## 2024-06-27 PROCEDURE — 99214 OFFICE O/P EST MOD 30 MIN: CPT | Performed by: OBSTETRICS & GYNECOLOGY

## 2024-06-27 PROCEDURE — A4562 PESSARY, NON RUBBER,ANY TYPE: HCPCS | Performed by: OBSTETRICS & GYNECOLOGY

## 2024-06-27 PROCEDURE — 83036 HEMOGLOBIN GLYCOSYLATED A1C: CPT | Performed by: NURSE PRACTITIONER

## 2024-06-27 NOTE — PROGRESS NOTES
Assessment/Plan:    Diagnoses and all orders for this visit:    Uterine procidentia    Prolapsed uterus    Class 2 obesity due to excess calories without serious comorbidity with body mass index (BMI) of 39.0 to 39.9 in adult        Pessary maintenance    Subjective: Here for pessary maintenance     Patient ID: Jessi Vann is a 63 y.o. female.    HPI  63-year-old female, morbidly obese.  History of uterine prolapse with complete procidentia.  Procidentia reduced without difficulty with gentle pressure returning the uterus into the pelvic cavity.  We have previously placed a #6 Gellhorn pessary at her last visit approximately 1 week ago.  She did well for several hours until it came out spontaneously.  Returns today for replacement of a new pessary #8.  #8 pessary was placed without difficulty patient able to ambulate around the room.  Rechecked after about 10 minutes the pessary remained in place.  She will return in 2 weeks time for pessary check and maintenance.  All questions answered.    Review of Systems unchanged    Objective: No acute distress  /82 (BP Location: Left arm, Patient Position: Sitting, Cuff Size: Standard)   Wt 95 kg (209 lb 6.4 oz)   BMI 39.57 kg/m²      Physical Exam  Vitals reviewed. Exam conducted with a chaperone present.   Constitutional:       Appearance: Normal appearance. She is obese.   HENT:      Head: Normocephalic.   Eyes:      Pupils: Pupils are equal, round, and reactive to light.   Pulmonary:      Effort: Pulmonary effort is normal.   Abdominal:      General: Abdomen is flat.      Palpations: Abdomen is soft.   Genitourinary:     General: Normal vulva.      Comments: Complete uterine procidentia.  Uterus and cervix hanging outside of the vaginal vault.  Reduced without difficulty.  #8 Gellhorn pessary placed.  Patient tolerated well  Observed in the office for 10 to 15 minutes she was able to retain pessary.  Recommend she return in 2 weeks time for reevaluation and  maintenance  Musculoskeletal:         General: Normal range of motion.   Skin:     General: Skin is warm and dry.   Neurological:      Mental Status: She is alert and oriented to person, place, and time.   Psychiatric:         Mood and Affect: Mood normal.         Behavior: Behavior normal.         Thought Content: Thought content normal.         Judgment: Judgment normal.        Please note    In addition to the time spent discussing the findings and results of today's visit and exam, I spent approximately 30 minutes of face-to-face time with the patient, greater than 50% of which was spent in counseling and coordination of care for this patient.

## 2024-06-27 NOTE — PROGRESS NOTES
History of Present Illness   Well Adult Physical   Patient here for a comprehensive physical exam.    Returns for physical.  S/p CSV VenaSeal on 24 with healed LLE venous ulceration.  Reviewed recent labwork           Diet and Physical Activity  Diet: well balanced diet  Weight concerns: Patient has class 2 obesity (BMI 35.0-39.9)  Exercise: infrequently   EtOH: rare/occasional/daily/social/none: no  Tobacco: none  Marijuana: none     Depression Screen  PHQ-2/9 Depression Screening    Little interest or pleasure in doing things: 0 - not at all  Feeling down, depressed, or hopeless: 0 - not at all  PHQ-2 Score: 0  PHQ-2 Interpretation: Negative depression screen          General Health  Hearing: Normal:  bilateral  Vision: most recent eye exam >1 year and wears glasses  Dental: regular dental visits     History:  LMP: No LMP recorded. Patient is postmenopausal.  : 8  Para: 7    Cancer Screening  Colononoscopy has not.  Will place order.   Mammogram N/A.  Will discuss with GYN later this afternoon     Pap 2021  Abnormal pap? no  Smoker never Annual screening with low-dose helical computed tomography (CT) for patients age 55 to 74 years with history of smoking at least 30 pack-years and, if a former smoker, had quit within the previous 15 years      Depression Screening and Follow-up Plan: Patient was screened for depression during today's encounter. They screened negative with a PHQ-2 score of 0.        The following portions of the patient's history were reviewed and updated as appropriate: allergies, current medications, past family history, past medical history, past social history, past surgical history and problem list.    Review of Systems     Review of Systems   Constitutional: Negative.  Negative for activity change, appetite change, chills, fatigue and fever.   HENT: Negative.  Negative for congestion, ear pain, postnasal drip and sinus pain.    Eyes: Negative.    Respiratory: Negative.   Negative for cough and shortness of breath.    Cardiovascular: Negative.  Negative for chest pain and leg swelling.   Gastrointestinal: Negative.  Negative for constipation and diarrhea.   Endocrine: Negative.    Genitourinary: Negative.  Negative for dysuria.   Musculoskeletal: Negative.    Skin:  Positive for wound.   Allergic/Immunologic: Negative.  Negative for immunocompromised state.   Neurological: Negative.  Negative for dizziness and light-headedness.   Hematological: Negative.    Psychiatric/Behavioral: Negative.         Past Medical History     Past Medical History:   Diagnosis Date    Blood clot in vein 1999    RLE, post partum    COVID-19 12/2021    Hypertension     Ulcer of right shin (HCC)        Past Surgical History     Past Surgical History:   Procedure Laterality Date    FRACTURE SURGERY Right 1984    with hardware; right leg    DC ENDOVEN ABLTJ INCMPTNT VEIN XTR LASER 1ST VEIN Right 06/24/2022    Procedure: ENDOVASCULAR LASER THERAPY (EVLT) right leg with multiple stab phlebectomies;  Surgeon: Sohail Baer MD;  Location:  MAIN OR;  Service: Vascular    DC ENDOVEN ABLTJ INCMPTNT VEIN XTR LASER 1ST VEIN Left 6/5/2024    Procedure: left greater saphenous vein closure with VenaSeal;  Surgeon: David Celaya DO;  Location: [facility];  Service: Vascular    DC STAB PHLEBT VARICOSE VEINS 1 XTR > 20 INCS Right 06/24/2022    Procedure: MULTIPLE STAB PHLEBECTOMIES, 10 stabs;  Surgeon: Sohail Baer MD;  Location:  MAIN OR;  Service: Vascular       Social History     Social History     Socioeconomic History    Marital status: /Civil Union     Spouse name: None    Number of children: None    Years of education: None    Highest education level: None   Occupational History    None   Tobacco Use    Smoking status: Never    Smokeless tobacco: Never   Vaping Use    Vaping status: Never Used   Substance and Sexual Activity    Alcohol use: Not Currently    Drug use: Never    Sexual activity:  Not Currently   Other Topics Concern    None   Social History Narrative    None     Social Determinants of Health     Financial Resource Strain: Not on file   Food Insecurity: No Food Insecurity (1/12/2022)    Hunger Vital Sign     Worried About Running Out of Food in the Last Year: Never true     Ran Out of Food in the Last Year: Never true   Transportation Needs: Unknown (1/12/2022)    PRAPARE - Transportation     Lack of Transportation (Medical): No     Lack of Transportation (Non-Medical): Not on file   Physical Activity: Not on file   Stress: Not on file   Social Connections: Not on file   Intimate Partner Violence: Not on file   Housing Stability: Unknown (1/12/2022)    Housing Stability Vital Sign     Unable to Pay for Housing in the Last Year: No     Number of Places Lived in the Last Year: Not on file     Unstable Housing in the Last Year: No       Family History     Family History   Problem Relation Age of Onset    No Known Problems Mother     No Known Problems Father     No Known Problems Sister     No Known Problems Brother     No Known Problems Daughter     No Known Problems Son     No Known Problems Maternal Aunt     No Known Problems Maternal Uncle     No Known Problems Paternal Aunt     No Known Problems Paternal Uncle     No Known Problems Maternal Grandmother     No Known Problems Maternal Grandfather     No Known Problems Paternal Grandmother     No Known Problems Paternal Grandfather     No Known Problems Cousin        Current Medications       Current Outpatient Medications:     Acetaminophen 325 MG CAPS, Take 3 capsules (975 mg total) by mouth every 8 (eight) hours as needed (mild pain), Disp: 30 capsule, Rfl: 0    betamethasone dipropionate (DIPROSONE) 0.05 % cream, Apply topically as needed (for redness), Disp: 30 g, Rfl: 0    ergocalciferol (VITAMIN D2) 50,000 units, Take 1 capsule (50,000 Units total) by mouth once a week, Disp: 12 capsule, Rfl: 3    fluocinolone (SYNALAR) 0.01 % external  "solution, Apply topically 2 (two) times a day Apply to area of inflammation twice daily as needed for flareups/burning.  Do not use for more than 7 days at a time.  Not for daily use, Disp: 60 mL, Rfl: 1     Allergies     Allergies   Allergen Reactions    Wound Dressing Adhesive Rash     bandaid adhesives       Objective     /84   Pulse 68   Ht 5' 1\" (1.549 m)   Wt 94.3 kg (208 lb)   SpO2 98%   BMI 39.30 kg/m²   Wt Readings from Last 3 Encounters:   06/27/24 94.3 kg (208 lb)   06/20/24 94.7 kg (208 lb 12.8 oz)   06/07/24 95.7 kg (211 lb)     BP Readings from Last 3 Encounters:   06/27/24 120/84   06/24/24 140/84   06/20/24 148/88     Pulse Readings from Last 3 Encounters:   06/27/24 68   06/24/24 62   06/07/24 58     Body mass index is 39.3 kg/m².     Physical Exam  Vitals and nursing note reviewed.   Constitutional:       Appearance: Normal appearance. She is obese.   HENT:      Head: Normocephalic and atraumatic.      Right Ear: Tympanic membrane, ear canal and external ear normal.      Left Ear: Tympanic membrane, ear canal and external ear normal.      Nose: Nose normal.      Mouth/Throat:      Mouth: Mucous membranes are moist.      Pharynx: Oropharynx is clear.   Eyes:      Extraocular Movements: Extraocular movements intact.      Conjunctiva/sclera: Conjunctivae normal.      Pupils: Pupils are equal, round, and reactive to light.   Cardiovascular:      Rate and Rhythm: Normal rate and regular rhythm.      Pulses: Normal pulses.      Heart sounds: Normal heart sounds.   Pulmonary:      Effort: Pulmonary effort is normal.      Breath sounds: Normal breath sounds.   Abdominal:      General: Bowel sounds are normal.      Palpations: Abdomen is soft.   Musculoskeletal:         General: Normal range of motion.      Cervical back: Normal range of motion and neck supple.      Right lower leg: Edema present.      Left lower leg: Edema present.      Comments: Trace BLE edema with compression stockings in " place.    Skin:     General: Skin is warm and dry.   Neurological:      General: No focal deficit present.      Mental Status: She is alert and oriented to person, place, and time.      Sensory: Sensory deficit present.      Gait: Gait abnormal.   Psychiatric:         Mood and Affect: Mood normal.         Behavior: Behavior normal.         Thought Content: Thought content normal.         Judgment: Judgment normal.           No results found.    Health Maintenance     Health Maintenance   Topic Date Due    DTaP,Tdap,and Td Vaccines (1 - Tdap) Never done    Cervical Cancer Screening  Never done    Breast Cancer Screening: Mammogram  Never done    Colorectal Cancer Screening  Never done    Zoster Vaccine (1 of 2) Never done    RSV Vaccine Age 60+ Years (1 - 1-dose 60+ series) Never done    COVID-19 Vaccine (1 - 2023-24 season) Never done    Influenza Vaccine (Season Ended) 09/01/2024    Depression Screening  06/27/2025    Annual Physical  06/27/2025    HIV Screening  Completed    Hepatitis C Screening  Completed    RSV Vaccine age 0-20 Months  Aged Out    Pneumococcal Vaccine: Pediatrics (0 to 5 Years) and At-Risk Patients (6 to 64 Years)  Aged Out    HIB Vaccine  Aged Out    IPV Vaccine  Aged Out    Hepatitis A Vaccine  Aged Out    Meningococcal ACWY Vaccine  Aged Out    HPV Vaccine  Aged Out       There is no immunization history on file for this patient.    Laboratory Results:   Lab Results   Component Value Date    WBC 6.6 05/29/2024    WBC 6.42 05/18/2022    WBC 6.85 01/25/2022    HGB 14.7 05/29/2024    HGB 15.1 05/18/2022    HGB 12.1 01/25/2022    HCT 43.6 05/29/2024    HCT 45.1 05/18/2022    HCT 37.0 01/25/2022    MCV 88.8 05/29/2024    MCV 92 05/18/2022    MCV 91 01/25/2022     05/29/2024     05/18/2022     01/25/2022     Lab Results   Component Value Date    BUN 17 05/29/2024    BUN 21 05/18/2022    BUN 11 01/25/2022     Lab Results   Component Value Date    GLUC 106 (H) 05/29/2024    GLUC  "98 01/25/2022    GLUC 98 01/24/2022    ALT 23 05/29/2024    ALT 91 (H) 01/25/2022     (H) 01/24/2022    AST 22 05/29/2024    AST 59 (H) 01/25/2022    AST 85 (H) 01/24/2022     No results found for: \"TSH\"  No results found for: \"A1C\"    Lipid Profile:   No results found for: \"CHOL\"  Lab Results   Component Value Date    HDL 71 05/29/2024     No results found for: \"LDLC\"  Lab Results   Component Value Date    LDLCALC 124 (H) 05/29/2024     Lab Results   Component Value Date    TRIG 123 05/29/2024         Assessment/Plan   1. Well adult exam  2. Vitamin D deficiency  Assessment & Plan:   Latest Reference Range & Units 05/29/24 07:27   EXTERNAL VITAMIN D,25 30 - 100 ng/mL 8 (L)   (L): Data is abnormally low    Adherent to vitamin D2 50,000 IU weekly.  Will recheck level in 2-3months.  3. Hypercholesterolemia  Assessment & Plan:   Latest Reference Range & Units 05/29/24 07:27   Cholesterol <200 mg/dL 218 (H)   Triglycerides <150 mg/dL 123   HDL > OR = 50 mg/dL 71   Non-HDL Cholesterol <130 mg/dL (calc) 147 (H)   LDL Calculated mg/dL (calc) 124 (H)   Chol/HDL Ratio <5.0 (calc) 3.1   (H): Data is abnormally high    Reports laxed diet and physical activity.  Discussed lifestyle modifications.  Will recheck this in 6months.   4. Prediabetes  Assessment & Plan:  A1C 5.8 in office.  Discussed lifestyle modifications and offered nutritional education.    5. Impaired fasting glucose  -     POCT hemoglobin A1c  6. Screening for colon cancer  -     Ambulatory Referral to Gastroenterology; Future      1. Healthy female exam.  2. Patient Counseling:   Nutrition: Stressed importance of a well balanced diet, moderation of sodium/saturated fat, caloric balance and sufficient intake of fiber  Exercise: Stressed the importance of regular exercise with a goal of 150 minutes per week  Dental Health: Discussed daily flossing and brushing and regular dental visits   Alcohol Use:  Recommended moderation of alcohol intake  Injury " Prevention: Discussed Safety Belts, Safety Helmets, and Smoke Detectors    Immunizations reviewed.   Discussed benefits of screening .  Discussed the patient's BMI with her.  The BMI is above average; BMI management plan is completed  3. Cancer Screening ordered GI consult for colonoscopy.  Mrs. Vann would like to scheduled PAP with GYN.    4. Labs reviewed; will recheck Vitamin D level in 2-3 months; LP in 6 months  5. Follow up in 6 months.    RAISSA Ignacio

## 2024-06-27 NOTE — ASSESSMENT & PLAN NOTE
Latest Reference Range & Units 05/29/24 07:27   EXTERNAL VITAMIN D,25 30 - 100 ng/mL 8 (L)   (L): Data is abnormally low    Adherent to vitamin D2 50,000 IU weekly.  Will recheck level in 2-3months.

## 2024-06-27 NOTE — ASSESSMENT & PLAN NOTE
Latest Reference Range & Units 05/29/24 07:27   Cholesterol <200 mg/dL 218 (H)   Triglycerides <150 mg/dL 123   HDL > OR = 50 mg/dL 71   Non-HDL Cholesterol <130 mg/dL (calc) 147 (H)   LDL Calculated mg/dL (calc) 124 (H)   Chol/HDL Ratio <5.0 (calc) 3.1   (H): Data is abnormally high    Reports laxed diet and physical activity.  Discussed lifestyle modifications.  Will recheck this in 6months.

## 2024-07-01 NOTE — PROGRESS NOTES
Patient ID: Jessi Vann is a 63 y.o. female Date of Birth 1960       Chief Complaint   Patient presents with   • Follow Up Wound Care Visit     Arrives with compression stocking intact on arrival.       Allergies:  Wound dressing adhesive    Diagnosis:  1. Idiopathic chronic venous hypertension of left lower extremity with ulcer (HCC)  2. Non-pressure chronic ulcer of left lower leg with fat layer exposed (HCC)  -     Wound cleansing and dressings Venous Ulcer Left;Medial Leg; Future  -     Wound Procedure Treatment Venous Ulcer Left;Medial Leg     Diagnosis ICD-10-CM Associated Orders   1. Idiopathic chronic venous hypertension of left lower extremity with ulcer (HCC)  I87.312     L97.929       2. Non-pressure chronic ulcer of left lower leg with fat layer exposed (HCC)  L97.922 Wound cleansing and dressings Venous Ulcer Left;Medial Leg     Wound Procedure Treatment Venous Ulcer Left;Medial Leg           Assessment & Plan:  Overall excellent progress having completed EVLT last week on 6/5/2024.  Chronic wound left leg has closed 100%.  Call if any signs of infection are noted.  Follow-up in approximately 1 month with Dr. Carlin.    Subjective:   6/24/2024: 63-year-old female seen today for follow-up left leg venous ulceration.  Completed EVLT last week on 6/5/2024.  Reports good progress and feels wound has completely closed.        The following portions of the patient's history were reviewed and updated as appropriate:   Patient Active Problem List   Diagnosis   • Cellulitis of right anterior lower leg   • Venous ulcer of left leg (HCC)   • Chronic venous hypertension (idiopathic) with ulcer of right lower extremity (HCC)   • COVID-19   • Elevated liver enzymes   • Thrombophlebitis   • Chronic venous hypertension w ulceration (HCC)   • Prolapsed uterus   • Preop examination   • History of DVT of lower extremity   • Vitamin D deficiency   • Prediabetes   • Hypercholesterolemia   • Class 2 obesity due to excess  calories without serious comorbidity with body mass index (BMI) of 39.0 to 39.9 in adult     Past Medical History:   Diagnosis Date   • Blood clot in vein 1999    RLE, post partum   • COVID-19 12/2021   • Hypertension    • Ulcer of right shin (HCC)      Past Surgical History:   Procedure Laterality Date   • FRACTURE SURGERY Right 1984    with hardware; right leg   • [place] ENDOVEN ABLTJ INCMPTNT VEIN XTR LASER 1ST VEIN Right 06/24/2022    Procedure: ENDOVASCULAR LASER THERAPY (EVLT) right leg with multiple stab phlebectomies;  Surgeon: Sohail Baer MD;  Location:  [facility] OR;  Service: Vascular   • KS ENDOVEN ABLTJ INCMPTNT VEIN XTR LASER 1ST VEIN Left 6/5/2024    Procedure: left greater saphenous vein closure with VenaSeal;  Surgeon: David Celaya DO;  [facility];  Service: Vascular   • [place] STAB PHLEBT VARICOSE VEINS 1 XTR > 20 INCS Right 06/24/2022    Procedure: MULTIPLE STAB PHLEBECTOMIES, 10 stabs;  Surgeon: Sohail Baer MD;  Location:  [facility];  Service: Vascular     Social History     Socioeconomic History   • Marital status: /Civil Union     Spouse name: Not on file   • Number of children: Not on file   • Years of education: Not on file   • Highest education level: Not on file   Occupational History   • Not on file   Tobacco Use   • Smoking status: Never   • Smokeless tobacco: Never   Vaping Use   • Vaping status: Never Used   Substance and Sexual Activity   • Alcohol use: Not Currently   • Drug use: Never   • Sexual activity: Not Currently   Other Topics Concern   • Not on file   Social History Narrative   • Not on file     Social Determinants of Health     Financial Resource Strain: Not on file   Food Insecurity: No Food Insecurity (1/12/2022)    Hunger Vital Sign    • Worried About Running Out of Food in the Last Year: Never true    • Ran Out of Food in the Last Year: Never true   Transportation Needs: Unknown (1/12/2022)    PRAPARE - Transportation    • Lack of Transportation  (Medical): No    • Lack of Transportation (Non-Medical): Not on file   Physical Activity: Not on file   Stress: Not on file   Social Connections: Not on file   Intimate Partner Violence: Not on file   Housing Stability: Unknown (1/12/2022)    Housing Stability Vital Sign    • Unable to Pay for Housing in the Last Year: No    • Number of Places Lived in the Last Year: Not on file    • Unstable Housing in the Last Year: No        Current Outpatient Medications:   •  Acetaminophen 325 MG CAPS, Take 3 capsules (975 mg total) by mouth every 8 (eight) hours as needed (mild pain), Disp: 30 capsule, Rfl: 0  •  betamethasone dipropionate (DIPROSONE) 0.05 % cream, Apply topically as needed (for redness), Disp: 30 g, Rfl: 0  •  ergocalciferol (VITAMIN D2) 50,000 units, Take 1 capsule (50,000 Units total) by mouth once a week, Disp: 12 capsule, Rfl: 3  •  fluocinolone (SYNALAR) 0.01 % external solution, Apply topically 2 (two) times a day Apply to area of inflammation twice daily as needed for flareups/burning.  Do not use for more than 7 days at a time.  Not for daily use, Disp: 60 mL, Rfl: 1  Family History   Problem Relation Age of Onset   • No Known Problems Mother    • No Known Problems Father    • No Known Problems Sister    • No Known Problems Brother    • No Known Problems Daughter    • No Known Problems Son    • No Known Problems Maternal Aunt    • No Known Problems Maternal Uncle    • No Known Problems Paternal Aunt    • No Known Problems Paternal Uncle    • No Known Problems Maternal Grandmother    • No Known Problems Maternal Grandfather    • No Known Problems Paternal Grandmother    • No Known Problems Paternal Grandfather    • No Known Problems Cousin       Review of Systems   Constitutional:  Negative for chills and fever.   HENT:  Negative for ear pain and sore throat.    Eyes:  Negative for pain and visual disturbance.   Respiratory:  Negative for cough and shortness of breath.    Cardiovascular:  Negative for  chest pain and palpitations.   Gastrointestinal:  Negative for abdominal pain and vomiting.   Genitourinary:  Negative for dysuria and hematuria.   Musculoskeletal:  Positive for gait problem. Negative for arthralgias and back pain.   Skin:  Positive for color change and wound. Negative for rash.   Neurological:  Positive for numbness. Negative for seizures and syncope.   Psychiatric/Behavioral: Negative.     All other systems reviewed and are negative.        Objective:  /84   Pulse 62   Temp (!) 97 °F (36.1 °C)   Resp 16     Physical Exam  Constitutional:       Appearance: Normal appearance.   HENT:      Head: Normocephalic.      Right Ear: External ear normal.      Left Ear: External ear normal.   Eyes:      Pupils: Pupils are equal, round, and reactive to light.   Cardiovascular:      Rate and Rhythm: Normal rate.      Pulses: Normal pulses.   Pulmonary:      Effort: Pulmonary effort is normal.   Musculoskeletal:      Cervical back: Normal range of motion.      Right lower leg: Edema present.      Left lower leg: Edema present.   Feet:      Right foot:      Protective Sensation: 10 sites tested.  10 sites sensed.      Skin integrity: Skin integrity normal.      Left foot:      Protective Sensation: 10 sites tested.  10 sites sensed.      Skin integrity: Skin integrity normal.   Skin:     General: Skin is warm and dry.      Capillary Refill: Capillary refill takes 2 to 3 seconds.      Comments: Left lower leg ulceration close completely 100%.  Good new skin.  No signs of infection.  No drainage.   Neurological:      General: No focal deficit present.      Mental Status: She is alert.   Psychiatric:         Mood and Affect: Mood normal.         Wound 06/05/24 Leg Left (Active)       Procedures             Wound Instructions:  Orders Placed This Encounter   Procedures   • Wound cleansing and dressings Venous Ulcer Left;Medial Leg     Left lower leg    Wound is healed, no dressing needed, continue to wear  "compression stockings daily.     Standing Status:   Future     Standing Expiration Date:   7/1/2024   • Wound Procedure Treatment Venous Ulcer Left;Medial Leg     This order was created via procedure documentation         Hai Roa DPM      Portions of the record may have been created with voice recognition software. Occasional wrong word or \"sound a like\" substitutions may have occurred due to the inherent limitations of voice recognition software. Read the chart carefully and recognize, using context, where substitutions have occurred.    "

## 2024-07-11 ENCOUNTER — OFFICE VISIT (OUTPATIENT)
Dept: GYNECOLOGY | Facility: CLINIC | Age: 64
End: 2024-07-11
Payer: COMMERCIAL

## 2024-07-11 VITALS
BODY MASS INDEX: 39.01 KG/M2 | DIASTOLIC BLOOD PRESSURE: 82 MMHG | SYSTOLIC BLOOD PRESSURE: 122 MMHG | WEIGHT: 206.6 LBS | HEIGHT: 61 IN

## 2024-07-11 DIAGNOSIS — E66.09 CLASS 2 OBESITY DUE TO EXCESS CALORIES WITHOUT SERIOUS COMORBIDITY WITH BODY MASS INDEX (BMI) OF 39.0 TO 39.9 IN ADULT: ICD-10-CM

## 2024-07-11 DIAGNOSIS — Z46.89 ENCOUNTER FOR PESSARY MAINTENANCE: Primary | ICD-10-CM

## 2024-07-11 DIAGNOSIS — N81.3 UTERINE PROCIDENTIA: ICD-10-CM

## 2024-07-11 DIAGNOSIS — N81.4 PROLAPSED UTERUS: ICD-10-CM

## 2024-07-11 PROCEDURE — 99214 OFFICE O/P EST MOD 30 MIN: CPT | Performed by: OBSTETRICS & GYNECOLOGY

## 2024-07-11 NOTE — PROGRESS NOTES
"Assessment/Plan:    Diagnoses and all orders for this visit:    Encounter for pessary maintenance    Prolapsed uterus    Uterine procidentia    Class 2 obesity due to excess calories without serious comorbidity with body mass index (BMI) of 39.0 to 39.9 in adult        Subjective: Here for pessary maintenance.     Patient ID: Jessi Vann is a 63 y.o. female.    HPI  63-year-old female  7 para 7 history of prolapsed uterus with procidentia present initially tried a #6 Gellhorn pessary which fell out several hours after being placed.  She was here a week later and we have placed a #8 Gellhorn and has stayed in place however was quite difficult to extract.  Needed a Sofía clamp to remove it.  Patient is still occasionally sexually active and she would like to be out of to get it and take it out.  I asked her to leave it in this time until we see her back and make other combinations.  If this fails or comes out she will bring back with her and we will consider options.  All questions answered.  Review of Systems unchanged  Objective: No acute distress  /82 (BP Location: Left arm, Patient Position: Sitting, Cuff Size: Standard)   Ht 5' 1\" (1.549 m)   Wt 93.7 kg (206 lb 9.6 oz)   BMI 39.04 kg/m²    Physical Exam  Vitals and nursing note reviewed. Exam conducted with a chaperone present.   Constitutional:       Appearance: Normal appearance. She is obese.   Pulmonary:      Effort: Pulmonary effort is normal.   Abdominal:      General: Abdomen is flat.      Palpations: Abdomen is soft.   Genitourinary:     General: Normal vulva.      Comments:  Pessary removed with difficulty requiring Sofía clamp to grasp it.  Replaced with #7 Gellhorn pessary.  She will follow-up within the next 2 to 3 weeks.  Neurological:      Mental Status: She is oriented to person, place, and time.   Psychiatric:         Mood and Affect: Mood normal.         Behavior: Behavior normal.         Thought Content: Thought content normal.   "       Judgment: Judgment normal.        Please note    In addition to the time spent discussing the findings and results of today's visit and exam, I spent approximately 30 minutes of face-to-face time with the patient, greater than 50% of which was spent in counseling and coordination of care for this patient.

## 2024-07-23 ENCOUNTER — OFFICE VISIT (OUTPATIENT)
Dept: VASCULAR SURGERY | Facility: CLINIC | Age: 64
End: 2024-07-23
Payer: COMMERCIAL

## 2024-07-23 VITALS
BODY MASS INDEX: 39.04 KG/M2 | HEART RATE: 64 BPM | HEIGHT: 61 IN | OXYGEN SATURATION: 98 % | DIASTOLIC BLOOD PRESSURE: 94 MMHG | SYSTOLIC BLOOD PRESSURE: 156 MMHG

## 2024-07-23 DIAGNOSIS — L97.909 CHRONIC VENOUS HYPERTENSION W ULCERATION (HCC): ICD-10-CM

## 2024-07-23 DIAGNOSIS — L97.929 VENOUS ULCER OF LEFT LEG (HCC): Primary | ICD-10-CM

## 2024-07-23 DIAGNOSIS — I83.029 VENOUS ULCER OF LEFT LEG (HCC): Primary | ICD-10-CM

## 2024-07-23 DIAGNOSIS — I89.0 SECONDARY LYMPHEDEMA: ICD-10-CM

## 2024-07-23 DIAGNOSIS — I87.319 CHRONIC VENOUS HYPERTENSION W ULCERATION (HCC): ICD-10-CM

## 2024-07-23 PROCEDURE — 99213 OFFICE O/P EST LOW 20 MIN: CPT | Performed by: NURSE PRACTITIONER

## 2024-07-23 NOTE — ASSESSMENT & PLAN NOTE
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

## 2024-07-23 NOTE — PROGRESS NOTES
Ambulatory Visit  Name: Jessi Vann      : 1960      MRN: [id number]  Encounter Provider: RAISSA Waldrop  Encounter Date: 2024   Encounter department: [facility] VASCULAR [facility]    Assessment & Plan   1. 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 Celaya 2024.    Patient presents the office to discuss chronic venous insufficiency    -Left distal lower leg venous stasis ulceration has since surgery and with local wound care  -She has mild to moderate venous stasis changes bilaterally.  -Advanced lipodermatosclerosis and skin fibrosis of left medial proximal calf  -We discussed management of chronic venous insufficiency with compression, periodic leg elevation, regular exercise and avoiding prolonged periods of being on her feet  -She is compliant with compression socks.  She replaces every 3 months.  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 She is agreeable to try pneumatic compression pumps daily for an hour  -Additionally moisturizing skin daily to maintain skin integrity and maintaining a normal weight  -Follow up in the office in 2 months to recheck swelling with compression pumps    2. Secondary lymphedema  Assessment & Plan:  -BLE chronic venous insufficiency,secondary lymphedema, stage II/III with lipodermatosclerosis and skin fibrosis  -Patient would benefit from pneumatic compression pumps to deter stasis changes  "advancing to the next stage  -Continue compression  -Leg measurements taken  -Compression pumps ordered through [employer]  -Follow-up in 2 months to recheck swelling with these measures  Orders:  -     Pneumatic compression pumps  3. Chronic venous hypertension w ulceration (HCC)  -     Pneumatic compression pumps    [employer]   Bernard Arvizu   Phone: [phone]  Fax: [phone]   E-mail: [email or web address]    Ordering Provider:  Roxy Barcenas (NPI: [id number])  [facility]  [address] [place] [address]   [address] [place] [id number]  Phone: [phone]  Fax: [phone]      Patient Information  MRN: [id number]   Jessi Edwar  1960  [address] [facility] [address] [phone]     Insurance Information  Payor: [employer] / Plan: AETTORSTEN PPO / Product Type: PPO /      [id number] - (Commercial / Managed Care)     Patient Height and Weight 5' 1\" (1.549 m)    Wt Readings from Last 1 Encounters:   07/11/24 93.7 kg (206 lb 9.6 oz)       Compression Lymphedema Pump Prescription Form      [x] Patient utilized Compression Garment ? 30 mmHg distally OR Gradient Wrap System    Appliance:     Legs:    [x] Right    [x] Left   Arms:    [] Right    [] Left     []Chest garment    []Abdominal garment     Length of need: 99 months    Protocol:  [x] Std: 40 mmHg, BID,  60 minutes  [] Other:   Pressures: __ mmHg    Frequency: __ / Day   Minutes/ Session: __ minutes    Patient History and Prognosis:  [x] Patient was diagnosed for the chronic disorder with reported on-set __January 2022  [x] Attempts at elevation and compression have not improved patients' condition and is now at risk of lymphatic disorder progressing to the next stage/ grade  [] Patient's physical condition/ range of motion limited for exercise  [] Patient/ Caregiver experienced difficulty applying 30 mmHg distal compression garments  [x] Patient compression stocking/ wrap tolerance limited due to " pain/ reduced circulation  [x] Patient advised to reduce salt intake and adhere to a daily regiment of elevation, compression, and lymphatic exercises  [x] Patient's severe condition will not improve without further treatment interventions  [x] Patient has noted skin changes such as marked hyperkeratosis with hyperplasia and hyperpigmentation, papillomatosis cutis lymphostatica, elephantiasis, and/ or skin breakdown with persisting lymphorrhea    Symptoms/ Observation/ Evaluation/ Plan of Care for Lymphedema / Venous Compression Pumps     Conservative Care ? 4 weeks for severe lymphedema (check all that apply):  Patient instructions for DAILY use of conservative therapies;  [x] Elevate extremities daily and nightly to reduce swelling  [x] Exercise and ambulate / range of motion (ROM) daily to increase fluid flow and reduce swelling  [x] Wear 30-mmHg distal compression garments / wraps daily to reduce / control swelling  [] Manual lymph drainage (MLD)  [x] On-set date of ulcers:  Right leg January 2022 and L leg March 2024      Initial Measurements      Body Part Right Left   Ankle  19.5 cm 20 cm   Calf  40 cm 39.5 cm   Knee 42.5 cm 40.5 cm   Mid-Thigh 51.5 cm 51.5 cm     History of Present Illness     Jessi Vnan is a 63 y.o. female who presents in follow-up to discuss chronic venous insufficiency.  She is status post left GSV VenaSeal in June secondary to left distal anterior lower leg venous stasis ulceration.  She followed at wound care center for Angelika lopez, last visit 6/24/2024.  Wound is completely healed.  She is compliant with gradient compression hose on a daily basis.  She replaces compression every 3 months.  She does complain of sensitivity to light touch.  She has discomfort to areas of skin fibrosis of the left medial lower leg. 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 She is on her feet 5am - 7pm daily and makes attempts to elevate her legs.     Review of Systems   Constitutional: Negative.    HENT:  "Negative.     Respiratory: Negative.     Cardiovascular:  Positive for leg swelling.   Genitourinary: Negative.    Allergic/Immunologic: Negative.    Neurological: Negative.    Hematological: Negative.    Psychiatric/Behavioral: Negative.       Medical History Reviewed by provider this encounter:  Tobacco  Allergies  Meds  Problems  Med Hx  Surg Hx  Fam Hx       Objective   I have reviewed and made appropriate changes to the review of systems input by the medical assistant.    Vitals:    07/23/24 1113   BP: 156/94   BP Location: Left arm   Patient Position: Sitting   Cuff Size: Standard   Pulse: 64   SpO2: 98%   Height: 5' 1\" (1.549 m)       Patient Active Problem List   Diagnosis    Cellulitis of right anterior lower leg    Venous ulcer of left leg (HCC)    Chronic venous hypertension (idiopathic) with ulcer of right lower extremity (HCC)    COVID-19    Elevated liver enzymes    Thrombophlebitis    Chronic venous hypertension w ulceration (HCC)    Prolapsed uterus    Preop examination    History of DVT of lower extremity    Vitamin D deficiency    Prediabetes    Hypercholesterolemia    Class 2 obesity due to excess calories without serious comorbidity with body mass index (BMI) of 39.0 to 39.9 in adult    Secondary lymphedema       Past Surgical History:   Procedure Laterality Date    FRACTURE SURGERY Right 1984    with hardware; right leg    MS ENDOVEN ABLTJ INCMPTNT VEIN XTR LASER 1ST VEIN Right 06/24/2022    Procedure: ENDOVASCULAR LASER THERAPY (EVLT) right leg with multiple stab phlebectomies;  Surgeon: Sohail Baer MD;  Location:  MAIN OR;  Service: Vascular    MS ENDOVEN ABLTJ INCMPTNT VEIN XTR LASER 1ST VEIN Left 6/5/2024    Procedure: left greater saphenous vein closure with VenaSeal;  Surgeon: David Celaya DO;  Location: [facility];  Service: Vascular    MS STAB PHLEBT VARICOSE VEINS 1 XTR > 20 INCS Right 06/24/2022    Procedure: MULTIPLE STAB PHLEBECTOMIES, 10 stabs;  Surgeon: Sohail" Jonn Baer MD;  Location[facility];  Service: Vascular       Family History   Problem Relation Age of Onset    No Known Problems Mother     No Known Problems Father     No Known Problems Sister     No Known Problems Brother     No Known Problems Daughter     No Known Problems Son     No Known Problems Maternal Aunt     No Known Problems Maternal Uncle     No Known Problems Paternal Aunt     No Known Problems Paternal Uncle     No Known Problems Maternal Grandmother     No Known Problems Maternal Grandfather     No Known Problems Paternal Grandmother     No Known Problems Paternal Grandfather     No Known Problems Cousin        Social History     Socioeconomic History    Marital status: /Civil Union     Spouse name: Not on file    Number of children: Not on file    Years of education: Not on file    Highest education level: Not on file   Occupational History    Not on file   Tobacco Use    Smoking status: Never    Smokeless tobacco: Never   Vaping Use    Vaping status: Never Used   Substance and Sexual Activity    Alcohol use: Not Currently    Drug use: Never    Sexual activity: Not Currently   Other Topics Concern    Not on file   Social History Narrative    Not on file     Social Determinants of Health     Financial Resource Strain: Not on file   Food Insecurity: No Food Insecurity (1/12/2022)    Hunger Vital Sign     Worried About Running Out of Food in the Last Year: Never true     Ran Out of Food in the Last Year: Never true   Transportation Needs: Unknown (1/12/2022)    PRAPARE - Transportation     Lack of Transportation (Medical): No     Lack of Transportation (Non-Medical): Not on file   Physical Activity: Not on file   Stress: Not on file   Social Connections: Not on file   Intimate Partner Violence: Not on file   Housing Stability: Unknown (1/12/2022)    Housing Stability Vital Sign     Unable to Pay for Housing in the Last Year: No     Number of Places Lived in the Last Year: Not on file      "Unstable Housing in the Last Year: No       Allergies   Allergen Reactions    Wound Dressing Adhesive Rash     bandaid adhesives         Current Outpatient Medications:     Acetaminophen 325 MG CAPS, Take 3 capsules (975 mg total) by mouth every 8 (eight) hours as needed (mild pain), Disp: 30 capsule, Rfl: 0    betamethasone dipropionate (DIPROSONE) 0.05 % cream, Apply topically as needed (for redness), Disp: 30 g, Rfl: 0    ergocalciferol (VITAMIN D2) 50,000 units, Take 1 capsule (50,000 Units total) by mouth once a week, Disp: 12 capsule, Rfl: 3    fluocinolone (SYNALAR) 0.01 % external solution, Apply topically 2 (two) times a day Apply to area of inflammation twice daily as needed for flareups/burning.  Do not use for more than 7 days at a time.  Not for daily use, Disp: 60 mL, Rfl: 1    /94 (BP Location: Left arm, Patient Position: Sitting, Cuff Size: Standard)   Pulse 64   Ht 5' 1\" (1.549 m)   SpO2 98%   BMI 39.04 kg/m²     Physical Exam  Vitals and nursing note reviewed. Exam conducted with a chaperone present.   Constitutional:       Appearance: Normal appearance. She is obese.   Eyes:      Extraocular Movements: Extraocular movements intact.   Cardiovascular:      Pulses:           Dorsalis pedis pulses are 2+ on the right side and 2+ on the left side.      Heart sounds: Normal heart sounds.   Pulmonary:      Effort: Pulmonary effort is normal.      Breath sounds: Normal breath sounds.   Abdominal:      Palpations: Abdomen is soft.   Musculoskeletal:         General: Swelling present. Normal range of motion.      Comments: No significantly enlarged truncal varicosities.  Chronic venous stasis changes bilaterally.  Lipodermatosclerosis left mid and medial proximal calf. No open ulcerations. No weeping. No cellulitis    Skin:     General: Skin is warm.   Neurological:      General: No focal deficit present.      Mental Status: She is alert and oriented to person, place, and time.   Psychiatric:    "      Behavior: Behavior normal.       Administrative Statements   I have spent a total time of 45 minutes in caring for this patient on the day of the visit/encounter including Prognosis, Risks and benefits of tx options, Instructions for management, Patient and family education, Importance of tx compliance, Risk factor reductions, Impressions, Counseling / Coordination of care, and Documenting in the medical record.

## 2024-07-23 NOTE — ASSESSMENT & PLAN NOTE
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 Celaya 6/5/2024.    Patient presents the office to discuss chronic venous insufficiency    -Left distal lower leg venous stasis ulceration has since surgery and with local wound care  -She has mild to moderate venous stasis changes bilaterally.  -Advanced lipodermatosclerosis and skin fibrosis of left medial proximal calf  -We discussed management of chronic venous insufficiency with compression, periodic leg elevation, regular exercise and avoiding prolonged periods of being on her feet  -She is compliant with compression socks.  She replaces every 3 months.  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 She is agreeable to try pneumatic compression pumps daily for an hour  -Additionally moisturizing skin daily to maintain skin integrity and maintaining a normal weight  -Follow up in the office in 2 months to recheck swelling with compression pumps

## 2024-07-23 NOTE — LETTER
2024     [employer] [facility]  [address]  [address]  [place] [address]    Patient: Jessi Vann   YOB: 1960   Date of Visit: 2024       Dear Dr. Dietz:    Thank you for referring Jessi Vann to me for evaluation. Below are my notes for this consultation.    If you have questions, please do not hesitate to call me. I look forward to following your patient along with you.         Sincerely,        RAISSA Waldrop        CC: No Recipients    RAISSA Waldrop  2024  5:19 PM  Incomplete  Ambulatory Visit  Name: Jessi Vann      : 1960      MRN: [id number]  Encounter Provider: RAISSA Waldrop  Encounter Date: 2024   Encounter department: [facility] VASCULAR [facility]    Assessment & Plan  1. 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 Celaya 2024.    Patient presents the office to discuss chronic venous insufficiency    -Left distal lower leg venous stasis ulceration has since surgery and with local wound care  -She has mild to moderate venous stasis changes bilaterally.  -Advanced lipodermatosclerosis and skin fibrosis of left medial proximal calf  -We discussed management of chronic venous insufficiency with compression, periodic leg elevation, regular exercise and avoiding prolonged periods of being on her feet  -She is compliant with compression socks.  She replaces every 3 months.  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 She is agreeable to try pneumatic compression pumps daily for an hour  -Additionally moisturizing skin daily to maintain skin integrity and maintaining a normal weight  -Follow up in the office in 2 months to recheck swelling with compression pumps    2. Secondary lymphedema  Assessment & Plan:  -BLE chronic venous insufficiency,secondary lymphedema, stage II/III with lipodermatosclerosis and skin fibrosis  -Patient would benefit from pneumatic compression pumps to deter stasis changes advancing to the next stage  -Continue compression  -Leg measurements taken  -Compression pumps ordered through [employer]  -Follow-up in 2 months to recheck swelling with these measures  Orders:  -     Pneumatic compression pumps  3. Chronic venous hypertension w ulceration (HCC)  -     Pneumatic compression pumps    [employer]   Bernard Arvizu   Phone: [phone]  Fax: [phone]   E-mail: [email or web address]    Ordering Provider:  Roxy Barcenas (NPI: [id number])  [facility]  [address] [place] [address]   [address] [place] [address]  Phone: [phone]  Fax: [phone]      Patient Information  MRN: [id number]   Jessi Vann  1960  [facility] [place]  [facility] [address] [phone]     Insurance Information  Payor: [employer] / Plan: [employer] PPO / Product Type: PPO /      [id number] - (Commercial / Managed Care)     Patient Height and Weight 5' 1\" (1.549 m)    Wt Readings from Last 1 Encounters:   07/11/24 93.7 kg (206 lb 9.6 oz)       Compression Lymphedema Pump Prescription Form      [x] Patient utilized Compression Garment = 30 mmHg distally OR Gradient Wrap System    Appliance:     Legs:    [x] Right    [x] Left   Arms:    [] Right    [] Left     []Chest garment    []Abdominal garment     Length of need: 99 months    Protocol:  [x] Std: 40 mmHg, BID,  60 minutes  [] Other:   Pressures: __ mmHg  "   Frequency: __ / Day   Minutes/ Session: __ minutes    Patient History and Prognosis:  [x] Patient was diagnosed for the chronic disorder with reported on-set __January 2022  [x] Attempts at elevation and compression have not improved patients' condition and is now at risk of lymphatic disorder progressing to the next stage/ grade  [] Patient's physical condition/ range of motion limited for exercise  [] Patient/ Caregiver experienced difficulty applying 30 mmHg distal compression garments  [x] Patient compression stocking/ wrap tolerance limited due to pain/ reduced circulation  [x] Patient advised to reduce salt intake and adhere to a daily regiment of elevation, compression, and lymphatic exercises  [x] Patient's severe condition will not improve without further treatment interventions  [x] Patient has noted skin changes such as marked hyperkeratosis with hyperplasia and hyperpigmentation, papillomatosis cutis lymphostatica, elephantiasis, and/ or skin breakdown with persisting lymphorrhea    Symptoms/ Observation/ Evaluation/ Plan of Care for Lymphedema / Venous Compression Pumps     Conservative Care = 4 weeks for severe lymphedema (check all that apply):  Patient instructions for DAILY use of conservative therapies;  [x] Elevate extremities daily and nightly to reduce swelling  [x] Exercise and ambulate / range of motion (ROM) daily to increase fluid flow and reduce swelling  [x] Wear 30-mmHg distal compression garments / wraps daily to reduce / control swelling  [] Manual lymph drainage (MLD)  [x] On-set date of ulcers:  Right leg January 2022 and L leg March 2024      Initial Measurements      Body Part Right Left   Ankle  19.5 cm 20 cm   Calf  40 cm 39.5 cm   Knee 42.5 cm 40.5 cm   Mid-Thigh 51.5 cm 51.5 cm     History of Present Illness    Jessi Vann is a 63 y.o. female who presents in follow-up to discuss chronic venous insufficiency.  She is status post left GSV VenaSeal in June secondary to left  "distal anterior lower leg venous stasis ulceration.  She followed at wound care center for Angelika lopez, last visit 6/24/2024.  Wound is completely healed.  She is compliant with gradient compression hose on a daily basis.  She replaces compression every 3 months.  She does complain of sensitivity to light touch.  She has discomfort to areas of skin fibrosis of the left medial lower leg. 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 She is on her feet 5am - 7pm daily and makes attempts to elevate her legs.     Review of Systems   Constitutional: Negative.    HENT: Negative.     Respiratory: Negative.     Cardiovascular:  Positive for leg swelling.   Genitourinary: Negative.    Allergic/Immunologic: Negative.    Neurological: Negative.    Hematological: Negative.    Psychiatric/Behavioral: Negative.       Medical History Reviewed by provider this encounter:  Tobacco  Allergies  Meds  Problems  Med Hx  Surg Hx  Fam Hx       Objective  I have reviewed and made appropriate changes to the review of systems input by the medical assistant.    Vitals:    07/23/24 1113   BP: 156/94   BP Location: Left arm   Patient Position: Sitting   Cuff Size: Standard   Pulse: 64   SpO2: 98%   Height: 5' 1\" (1.549 m)       Patient Active Problem List   Diagnosis   • Cellulitis of right anterior lower leg   • Venous ulcer of left leg (HCC)   • Chronic venous hypertension (idiopathic) with ulcer of right lower extremity (HCC)   • COVID-19   • Elevated liver enzymes   • Thrombophlebitis   • Chronic venous hypertension w ulceration (HCC)   • Prolapsed uterus   • Preop examination   • History of DVT of lower extremity   • Vitamin D deficiency   • Prediabetes   • Hypercholesterolemia   • Class 2 obesity due to excess calories without serious comorbidity with body mass index (BMI) of 39.0 to 39.9 in adult   • Secondary lymphedema       Past Surgical History:   Procedure Laterality Date   • FRACTURE SURGERY Right 1984    with hardware; right leg "   • OR ENDOVEN ABLTJ INCMPTNT VEIN XTR LASER 1ST VEIN Right 06/24/2022    Procedure: ENDOVASCULAR LASER THERAPY (EVLT) right leg with multiple stab phlebectomies;  Surgeon: Sohail Baer MD;  Location:  MAIN OR;  Service: Vascular   • OR ENDOVEN ABLTJ INCMPTNT VEIN XTR LASER 1ST VEIN Left 6/5/2024    Procedure: left greater saphenous vein closure with VenaSeal;  Surgeon: David Celaya DO;  Location: [facility] OR;  Service: Vascular   • OR STAB PHLEBT VARICOSE VEINS 1 XTR > 20 INCS Right 06/24/2022    Procedure: MULTIPLE STAB PHLEBECTOMIES, 10 stabs;  Surgeon: Sohail Baer MD;  Location:  MAIN OR;  Service: Vascular       Family History   Problem Relation Age of Onset   • No Known Problems Mother    • No Known Problems Father    • No Known Problems Sister    • No Known Problems Brother    • No Known Problems Daughter    • No Known Problems Son    • No Known Problems Maternal Aunt    • No Known Problems Maternal Uncle    • No Known Problems Paternal Aunt    • No Known Problems Paternal Uncle    • No Known Problems Maternal Grandmother    • No Known Problems Maternal Grandfather    • No Known Problems Paternal Grandmother    • No Known Problems Paternal Grandfather    • No Known Problems Cousin        Social History     Socioeconomic History   • Marital status: /Civil Union     Spouse name: Not on file   • Number of children: Not on file   • Years of education: Not on file   • Highest education level: Not on file   Occupational History   • Not on file   Tobacco Use   • Smoking status: Never   • Smokeless tobacco: Never   Vaping Use   • Vaping status: Never Used   Substance and Sexual Activity   • Alcohol use: Not Currently   • Drug use: Never   • Sexual activity: Not Currently   Other Topics Concern   • Not on file   Social History Narrative   • Not on file     Social Determinants of Health     Financial Resource Strain: Not on file   Food Insecurity: No Food Insecurity (1/12/2022)    Hunger  "Vital Sign    • Worried About Running Out of Food in the Last Year: Never true    • Ran Out of Food in the Last Year: Never true   Transportation Needs: Unknown (1/12/2022)    PRAPARE - Transportation    • Lack of Transportation (Medical): No    • Lack of Transportation (Non-Medical): Not on file   Physical Activity: Not on file   Stress: Not on file   Social Connections: Not on file   Intimate Partner Violence: Not on file   Housing Stability: Unknown (1/12/2022)    Housing Stability Vital Sign    • Unable to Pay for Housing in the Last Year: No    • Number of Places Lived in the Last Year: Not on file    • Unstable Housing in the Last Year: No       Allergies   Allergen Reactions   • Wound Dressing Adhesive Rash     bandaid adhesives         Current Outpatient Medications:   •  Acetaminophen 325 MG CAPS, Take 3 capsules (975 mg total) by mouth every 8 (eight) hours as needed (mild pain), Disp: 30 capsule, Rfl: 0  •  betamethasone dipropionate (DIPROSONE) 0.05 % cream, Apply topically as needed (for redness), Disp: 30 g, Rfl: 0  •  ergocalciferol (VITAMIN D2) 50,000 units, Take 1 capsule (50,000 Units total) by mouth once a week, Disp: 12 capsule, Rfl: 3  •  fluocinolone (SYNALAR) 0.01 % external solution, Apply topically 2 (two) times a day Apply to area of inflammation twice daily as needed for flareups/burning.  Do not use for more than 7 days at a time.  Not for daily use, Disp: 60 mL, Rfl: 1    /94 (BP Location: Left arm, Patient Position: Sitting, Cuff Size: Standard)   Pulse 64   Ht 5' 1\" (1.549 m)   SpO2 98%   BMI 39.04 kg/m²     Physical Exam  Vitals and nursing note reviewed. Exam conducted with a chaperone present.   Constitutional:       Appearance: Normal appearance. She is obese.   Eyes:      Extraocular Movements: Extraocular movements intact.   Cardiovascular:      Pulses:           Dorsalis pedis pulses are 2+ on the right side and 2+ on the left side.      Heart sounds: Normal heart " sounds.   Pulmonary:      Effort: Pulmonary effort is normal.      Breath sounds: Normal breath sounds.   Abdominal:      Palpations: Abdomen is soft.   Musculoskeletal:         General: Swelling present. Normal range of motion.      Comments: No significantly enlarged truncal varicosities.  Chronic venous stasis changes bilaterally.  Lipodermatosclerosis left mid and medial proximal calf. No open ulcerations. No weeping. No cellulitis    Skin:     General: Skin is warm.   Neurological:      General: No focal deficit present.      Mental Status: She is alert and oriented to person, place, and time.   Psychiatric:         Behavior: Behavior normal.       Administrative Statements  I have spent a total time of 45 minutes in caring for this patient on the day of the visit/encounter including Prognosis, Risks and benefits of tx options, Instructions for management, Patient and family education, Importance of tx compliance, Risk factor reductions, Impressions, Counseling / Coordination of care, and Documenting in the medical record.

## 2024-07-29 ENCOUNTER — TELEPHONE (OUTPATIENT)
Age: 64
End: 2024-07-29

## 2024-07-29 NOTE — TELEPHONE ENCOUNTER
"Pt had surgery with DIM in June.[employer] has denied the claim. They want a Letter of Medical Necessity in order to reconsider the claim.  The pt spoke to \"Thuan\" at [facility] [phone].  Case #[id number]  [facility] ID [id number]  Please let the pt know the status of the request moving forward.  "

## 2024-07-31 NOTE — TELEPHONE ENCOUNTER
Patient is calling in regard to the status of her letter of medical necessity.  She would like a copy of it and would like to be notified when it is completed.

## 2024-08-01 ENCOUNTER — OFFICE VISIT (OUTPATIENT)
Dept: GYNECOLOGY | Facility: CLINIC | Age: 64
End: 2024-08-01
Payer: COMMERCIAL

## 2024-08-01 VITALS
WEIGHT: 208.2 LBS | SYSTOLIC BLOOD PRESSURE: 140 MMHG | BODY MASS INDEX: 39.31 KG/M2 | DIASTOLIC BLOOD PRESSURE: 86 MMHG | HEIGHT: 61 IN

## 2024-08-01 DIAGNOSIS — E66.9 OBESITY (BMI 30-39.9): ICD-10-CM

## 2024-08-01 DIAGNOSIS — N81.4 PROLAPSED UTERUS: Primary | ICD-10-CM

## 2024-08-01 DIAGNOSIS — N81.3 UTERINE PROCIDENTIA: ICD-10-CM

## 2024-08-01 DIAGNOSIS — Z46.89 ENCOUNTER FOR PESSARY MAINTENANCE: ICD-10-CM

## 2024-08-01 PROCEDURE — 3079F DIAST BP 80-89 MM HG: CPT | Performed by: OBSTETRICS & GYNECOLOGY

## 2024-08-01 PROCEDURE — 99214 OFFICE O/P EST MOD 30 MIN: CPT | Performed by: OBSTETRICS & GYNECOLOGY

## 2024-08-01 PROCEDURE — 3077F SYST BP >= 140 MM HG: CPT | Performed by: OBSTETRICS & GYNECOLOGY

## 2024-08-01 NOTE — TELEPHONE ENCOUNTER
Patient calling for status on her letter of medical necessity and is very upset because she has been calling since 7/29/24.  It was explained to her that the message was sent to our manager and would need to be reviewed by our authorization team and then would need the physician to write the letter.  She insisted on speaking with the manager and states that she will call back tomorrow and ask for her directly.

## 2024-08-01 NOTE — PROGRESS NOTES
"Assessment/Plan:    Diagnoses and all orders for this visit:    Uterine procidentia  -     Ambulatory referral to Urogynecology; Future    Prolapsed uterus  -     Ambulatory referral to Urogynecology; Future    Encounter for pessary maintenance        Obesity BMI 39    Subjective: Pessary maintenance     Patient ID: Jessi Vann is a 63 y.o. female.    HPI  63-year-old female  7 para 7 history of uterine prolapse and uterine procidentia.  Recently maintain with a #8 Gellhorn pessary.  Previously she had a #6 however this could not be retained and fell out on its own.  She does have some DAMIAN symptoms, not severe and improved with pessary in place.  No other new GYN concerns or complaints. She no longer wants to do this pessary maintenance.  She would like to resume normal sexual activity without pessary needing to be removed intermittently.  Patient prefers to have surgical repair.  Referral given to urogynecology.  Follow-up in 3 months time for routine GYN care.    Review of Systems unchanged    Objective: No acute distress  /86   Ht 5' 1\" (1.549 m)   Wt 94.4 kg (208 lb 3.2 oz)   BMI 39.34 kg/m²      Physical Exam  Vitals and nursing note reviewed. Exam conducted with a chaperone present.   Constitutional:       Appearance: Normal appearance. She is obese.   HENT:      Head: Normocephalic.   Eyes:      Extraocular Movements: Extraocular movements intact.      Pupils: Pupils are equal, round, and reactive to light.   Pulmonary:      Effort: Pulmonary effort is normal.   Abdominal:      General: Abdomen is flat.      Palpations: Abdomen is soft.   Genitourinary:     General: Normal vulva.   Musculoskeletal:         General: Normal range of motion.      Cervical back: Normal range of motion.   Skin:     General: Skin is warm and dry.   Neurological:      Mental Status: She is alert and oriented to person, place, and time.   Psychiatric:         Mood and Affect: Mood normal.         Behavior: Behavior " normal.         Thought Content: Thought content normal.         Judgment: Judgment normal.        Please note    In addition to the time spent discussing the findings and results of today's visit and exam, I spent approximately 30 minutes of face-to-face time with the patient, greater than 50% of which was spent in counseling and coordination of care for this patient.

## 2024-08-01 NOTE — TELEPHONE ENCOUNTER
Tried to call pt back,please let pt know noel cooper w/ authorization is working on the letter of medical necessity and insurance will have done and sent by end of day tomorrow, per manager Noel Posadas

## 2024-08-05 ENCOUNTER — OFFICE VISIT (OUTPATIENT)
Dept: FAMILY MEDICINE CLINIC | Facility: HOSPITAL | Age: 64
End: 2024-08-05
Payer: COMMERCIAL

## 2024-08-05 VITALS
OXYGEN SATURATION: 97 % | TEMPERATURE: 98 F | HEIGHT: 61 IN | WEIGHT: 203 LBS | SYSTOLIC BLOOD PRESSURE: 140 MMHG | BODY MASS INDEX: 38.33 KG/M2 | HEART RATE: 75 BPM | DIASTOLIC BLOOD PRESSURE: 84 MMHG | RESPIRATION RATE: 16 BRPM

## 2024-08-05 DIAGNOSIS — R30.0 DYSURIA: Primary | ICD-10-CM

## 2024-08-05 LAB
SL AMB  POCT GLUCOSE, UA: ABNORMAL
SL AMB LEUKOCYTE ESTERASE,UA: ABNORMAL
SL AMB POCT BILIRUBIN,UA: ABNORMAL
SL AMB POCT BLOOD,UA: ABNORMAL
SL AMB POCT CLARITY,UA: ABNORMAL
SL AMB POCT COLOR,UA: ABNORMAL
SL AMB POCT KETONES,UA: ABNORMAL
SL AMB POCT NITRITE,UA: ABNORMAL
SL AMB POCT PH,UA: 5.5
SL AMB POCT SPECIFIC GRAVITY,UA: 1.02

## 2024-08-05 PROCEDURE — 99213 OFFICE O/P EST LOW 20 MIN: CPT

## 2024-08-05 PROCEDURE — 81000 URINALYSIS NONAUTO W/SCOPE: CPT

## 2024-08-05 RX ORDER — SULFAMETHOXAZOLE AND TRIMETHOPRIM 800; 160 MG/1; MG/1
1 TABLET ORAL 2 TIMES DAILY
Qty: 6 TABLET | Refills: 0 | Status: SHIPPED | OUTPATIENT
Start: 2024-08-05 | End: 2024-08-08

## 2024-08-05 NOTE — ASSESSMENT & PLAN NOTE
- New onset dysuria, difficulty urinating    Plan:  - POCT urine dip performed. Positive blood, but negative leukocytes & nitrites  - Given pt is symptomatic, will empirically treat with Bactrim x3 days d/t previous sensitivities showing susceptibility  - Will contact pt if medication needs to be changed  - Next scheduled follow up with PCP in December, will keep original appointment unless needs to be seen sooner  - Proceed to establish with urogynecology & continue to follow with OBGYN for pelvic prolapse and incontinence

## 2024-08-05 NOTE — PROGRESS NOTES
Ambulatory Visit  Name: Jessi Vann      : 1960      MRN: [id number]  Encounter Provider: Katherin Doyle DO  Encounter Date: 2024   Encounter department: [facility] PRIMARY CARE [address]    Assessment & Plan   1. Dysuria  Assessment & Plan:  - New onset dysuria, difficulty urinating    Plan:  - POCT urine dip performed. Positive blood, but negative leukocytes & nitrites  - Given pt is symptomatic, will empirically treat with Bactrim x3 days d/t previous sensitivities showing susceptibility  - Will contact pt if medication needs to be changed  - Next scheduled follow up with PCP in December, will keep original appointment unless needs to be seen sooner  - Proceed to establish with urogynecology & continue to follow with OBGYN for pelvic prolapse and incontinence  Orders:  -     POCT urine dip non-auto scope  -     sulfamethoxazole-trimethoprim (BACTRIM DS) 800-160 mg per tablet; Take 1 tablet by mouth 2 (two) times a day for 3 days  -     Urine culture       History of Present Illness     64yo female presenting c/o difficulty urinating, dysuria that began on Thursday, been persistent since. Reports seeing GYN that day and a #7 pessary was removed. Pt denies fever, chills, hematuria. Reports lower abdominal discomfort, denies n/v, flank pain. Denies any allergies to medication.        Review of Systems   Gastrointestinal:  Positive for abdominal pain. Negative for nausea and vomiting.   Genitourinary:  Positive for difficulty urinating and dysuria. Negative for flank pain and hematuria.     Current Outpatient Medications on File Prior to Visit   Medication Sig Dispense Refill    Acetaminophen 325 MG CAPS Take 3 capsules (975 mg total) by mouth every 8 (eight) hours as needed (mild pain) 30 capsule 0    betamethasone dipropionate (DIPROSONE) 0.05 % cream Apply topically as needed (for redness) 30 g 0    ergocalciferol (VITAMIN D2) 50,000 units Take 1 capsule (50,000 Units total) by  "mouth once a week 12 capsule 3    fluocinolone (SYNALAR) 0.01 % external solution Apply topically 2 (two) times a day Apply to area of inflammation twice daily as needed for flareups/burning.  Do not use for more than 7 days at a time.  Not for daily use (Patient not taking: Reported on 8/5/2024) 60 mL 1     No current facility-administered medications on file prior to visit.      Social History     Tobacco Use    Smoking status: Never    Smokeless tobacco: Never   Vaping Use    Vaping status: Never Used   Substance and Sexual Activity    Alcohol use: Not Currently    Drug use: Never    Sexual activity: Not Currently     Objective     /84   Pulse 75   Temp 98 °F (36.7 °C) (Tympanic)   Resp 16   Ht 5' 1\" (1.549 m)   Wt 92.1 kg (203 lb)   SpO2 97%   BMI 38.36 kg/m²     Physical Exam  Constitutional:       General: She is not in acute distress.     Appearance: Normal appearance. She is obese. She is not ill-appearing.   Cardiovascular:      Rate and Rhythm: Normal rate and regular rhythm.      Heart sounds: Normal heart sounds.   Pulmonary:      Breath sounds: Normal breath sounds.   Abdominal:      Palpations: Abdomen is soft.      Tenderness: There is no abdominal tenderness. There is no guarding or rebound.   Neurological:      Mental Status: She is alert.       Administrative Statements     Katherin Doyle, DO  Family Medicine          "

## 2024-08-13 ENCOUNTER — OFFICE VISIT (OUTPATIENT)
Dept: VASCULAR SURGERY | Facility: CLINIC | Age: 64
End: 2024-08-13
Payer: COMMERCIAL

## 2024-08-13 VITALS
OXYGEN SATURATION: 100 % | HEIGHT: 61 IN | HEART RATE: 62 BPM | BODY MASS INDEX: 38.71 KG/M2 | WEIGHT: 205 LBS | DIASTOLIC BLOOD PRESSURE: 76 MMHG | SYSTOLIC BLOOD PRESSURE: 138 MMHG

## 2024-08-13 DIAGNOSIS — L97.909 CHRONIC VENOUS HYPERTENSION W ULCERATION (HCC): Primary | ICD-10-CM

## 2024-08-13 DIAGNOSIS — I87.319 CHRONIC VENOUS HYPERTENSION W ULCERATION (HCC): Primary | ICD-10-CM

## 2024-08-13 PROCEDURE — 99213 OFFICE O/P EST LOW 20 MIN: CPT | Performed by: SURGERY

## 2024-08-13 NOTE — PROGRESS NOTES
Ambulatory Visit  Name: Jessi Vann      : 1960      MRN: [id number]  Encounter Provider: David Celaya DO  Encounter Date: 2024   Encounter department: [facility]    Assessment & Plan   1. Chronic venous hypertension w ulceration (HCC)  Assessment & Plan:  Status post left great saphenous vein closure using VenaSeal secondary to venous ulceration.  The ulcerated site has healed.  Patient complains of some discoloration to the skin which I explained is irreversible and secondary to the underlying venous insufficiency.  Recommend skin moisturizer as needed.  I recommend that she continue wearing compression stockings and elevate legs whenever feasible.  She may otherwise follow-up with our office on an as-needed basis.      History of Present Illness     Patient is here today for a 6 week follow up exam s/p a left GSV closure with Venaseal done 2024. Patient is well healed and wound on her left ankle is well healed. She denies any pain.  Patient is a non-smoker.         Jessi Vann is a 63 y.o. female who presents to the office following left greater saphenous vein closure using VenaSeal back in .      Review of Systems   Constitutional: Negative.    HENT: Negative.     Eyes: Negative.    Respiratory: Negative.     Cardiovascular: Negative.    Gastrointestinal: Negative.    Endocrine: Negative.    Genitourinary: Negative.    Musculoskeletal: Negative.    Skin: Negative.    Allergic/Immunologic: Negative.    Neurological: Negative.    Hematological: Negative.    Psychiatric/Behavioral: Negative.       Past Medical History   Past Medical History:   Diagnosis Date    Blood clot in vein     RLE, post partum    COVID-19 2021    Hypertension     Ulcer of right shin (HCC)      Past Surgical History:   Procedure Laterality Date    FRACTURE SURGERY Right     with hardware; right leg    IN ENDOVEN ABLTJ INCMPTNT VEIN XTR LASER 1ST VEIN Right 2022    Procedure:  ENDOVASCULAR LASER THERAPY (EVLT) right leg with multiple stab phlebectomies;  Surgeon: Sohail Baer MD;  Location: [facility];  Service: Vascular    KS ENDOVEN ABLTJ INCMPTNT VEIN XTR LASER 1ST VEIN Left 6/5/2024    Procedure: left greater saphenous vein closure with VenaSeal;  Surgeon: David Celaya DO;  Location: [facility];  Service: Vascular    KS STAB PHLEBT VARICOSE VEINS 1 XTR > 20 INCS Right 06/24/2022    Procedure: MULTIPLE STAB PHLEBECTOMIES, 10 stabs;  Surgeon: Sohail Baer MD;  Location: [facility];  Service: Vascular     Family History   Problem Relation Age of Onset    No Known Problems Mother     No Known Problems Father     No Known Problems Sister     No Known Problems Brother     No Known Problems Daughter     No Known Problems Son     No Known Problems Maternal Aunt     No Known Problems Maternal Uncle     No Known Problems Paternal Aunt     No Known Problems Paternal Uncle     No Known Problems Maternal Grandmother     No Known Problems Maternal Grandfather     No Known Problems Paternal Grandmother     No Known Problems Paternal Grandfather     No Known Problems Cousin      Current Outpatient Medications on File Prior to Visit   Medication Sig Dispense Refill    Acetaminophen 325 MG CAPS Take 3 capsules (975 mg total) by mouth every 8 (eight) hours as needed (mild pain) 30 capsule 0    betamethasone dipropionate (DIPROSONE) 0.05 % cream Apply topically as needed (for redness) 30 g 0    ergocalciferol (VITAMIN D2) 50,000 units Take 1 capsule (50,000 Units total) by mouth once a week 12 capsule 3    fluocinolone (SYNALAR) 0.01 % external solution Apply topically 2 (two) times a day Apply to area of inflammation twice daily as needed for flareups/burning.  Do not use for more than 7 days at a time.  Not for daily use (Patient not taking: Reported on 8/5/2024) 60 mL 1     No current facility-administered medications on file prior to visit.     Allergies   Allergen Reactions    Wound  "Dressing Adhesive Rash     bandaid adhesives      Current Outpatient Medications on File Prior to Visit   Medication Sig Dispense Refill    Acetaminophen 325 MG CAPS Take 3 capsules (975 mg total) by mouth every 8 (eight) hours as needed (mild pain) 30 capsule 0    betamethasone dipropionate (DIPROSONE) 0.05 % cream Apply topically as needed (for redness) 30 g 0    ergocalciferol (VITAMIN D2) 50,000 units Take 1 capsule (50,000 Units total) by mouth once a week 12 capsule 3    fluocinolone (SYNALAR) 0.01 % external solution Apply topically 2 (two) times a day Apply to area of inflammation twice daily as needed for flareups/burning.  Do not use for more than 7 days at a time.  Not for daily use (Patient not taking: Reported on 8/5/2024) 60 mL 1     No current facility-administered medications on file prior to visit.      Social History     Tobacco Use    Smoking status: Never    Smokeless tobacco: Never   Vaping Use    Vaping status: Never Used   Substance and Sexual Activity    Alcohol use: Not Currently    Drug use: Never    Sexual activity: Not Currently     Objective     /76 (BP Location: Left arm, Patient Position: Sitting, Cuff Size: Large)   Pulse 62   Ht 5' 1\" (1.549 m)   Wt 93 kg (205 lb)   SpO2 100%   BMI 38.73 kg/m²     Physical Exam  Constitutional:       General: She is not in acute distress.     Appearance: She is obese. She is not ill-appearing, toxic-appearing or diaphoretic.   HENT:      Head: Normocephalic and atraumatic.   Cardiovascular:      Rate and Rhythm: Normal rate.   Pulmonary:      Effort: Pulmonary effort is normal.   Skin:     Comments: Left lower leg venous ulcerated site appears healed.  There is surrounding skin discoloration secondary to underlying chronic venous insufficiency.  I explained to Jessi that this is irreversible.  I also discussed that should she have increased erythema and pain it may represent cellulitis.  Should she experience concern for cellulitis " recommend follow-up with PCP for possible antibiotics.   Neurological:      Mental Status: She is alert.   Psychiatric:         Mood and Affect: Mood normal.         Behavior: Behavior normal.         Thought Content: Thought content normal.         Judgment: Judgment normal.       Administrative Statements   I have spent a total time of 20 minutes in caring for this patient on the day of the visit/encounter including Diagnostic results, Prognosis, Risks and benefits of tx options, Instructions for management, Patient and family education, Importance of tx compliance, Risk factor reductions, Impressions, Counseling / Coordination of care, Documenting in the medical record, Reviewing / ordering tests, medicine, procedures  , and Obtaining or reviewing history  .

## 2024-08-13 NOTE — ASSESSMENT & PLAN NOTE
Status post left great saphenous vein closure using VenaSeal secondary to venous ulceration.  The ulcerated site has healed.  Patient complains of some discoloration to the skin which I explained is irreversible and secondary to the underlying venous insufficiency.  Recommend skin moisturizer as needed.  I recommend that she continue wearing compression stockings and elevate legs whenever feasible.  She may otherwise follow-up with our office on an as-needed basis.  
today

## 2024-09-03 ENCOUNTER — TELEPHONE (OUTPATIENT)
Age: 64
End: 2024-09-03

## 2024-09-03 NOTE — TELEPHONE ENCOUNTER
LMOM that she would need to call insurance company to find out cost of EKG.  Advised her to call if a pre op is needed.

## 2024-09-03 NOTE — TELEPHONE ENCOUNTER
Pt needs an EKG only done for a procedure on Oct 7, 2024 (prolapsed uterus). Pt would like to know if she comes in for Pre op visit does she get charged sperate fr EKP? Would it be cheaper to go to hospital for EKG only? Please advise

## 2024-09-24 ENCOUNTER — OFFICE VISIT (OUTPATIENT)
Dept: VASCULAR SURGERY | Facility: CLINIC | Age: 64
End: 2024-09-24
Payer: COMMERCIAL

## 2024-09-24 VITALS
RESPIRATION RATE: 18 BRPM | WEIGHT: 200.8 LBS | DIASTOLIC BLOOD PRESSURE: 82 MMHG | OXYGEN SATURATION: 97 % | HEIGHT: 62 IN | HEART RATE: 62 BPM | BODY MASS INDEX: 36.95 KG/M2 | SYSTOLIC BLOOD PRESSURE: 132 MMHG

## 2024-09-24 DIAGNOSIS — I87.2 CHRONIC VENOUS INSUFFICIENCY OF LOWER EXTREMITY: Primary | ICD-10-CM

## 2024-09-24 PROBLEM — L97.919 CHRONIC VENOUS HYPERTENSION (IDIOPATHIC) WITH ULCER OF RIGHT LOWER EXTREMITY (HCC): Status: RESOLVED | Noted: 2022-01-03 | Resolved: 2024-09-24

## 2024-09-24 PROBLEM — I87.311 CHRONIC VENOUS HYPERTENSION (IDIOPATHIC) WITH ULCER OF RIGHT LOWER EXTREMITY (HCC): Status: RESOLVED | Noted: 2022-01-03 | Resolved: 2024-09-24

## 2024-09-24 PROCEDURE — 99213 OFFICE O/P EST LOW 20 MIN: CPT | Performed by: NURSE PRACTITIONER

## 2024-09-24 NOTE — ASSESSMENT & PLAN NOTE
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 Celaya 6/5/2024.    Patient returns to the office to recheck lower extremities     -LLE venous ulceration healed after venous intervention   -Since last visit with me she started to use compression pumps daily for an hour in the evening. Significant improvement in lower extremities with use of compression pumps. Knees uncomfortable after 45 minutes of using pump. We discussed trial two options and see what works 1 - using pumps 30 minutes in AM and 1 hour in evening and 2 - Try to dial down compression pump to 35 or 40mmHg to see if this is less bothersome on the knees   -Continue compression   -Follow up in 2-3 months to recheck legs with consistent use of compression pumps

## 2024-09-24 NOTE — LETTER
2024     Collax Mirela  [id number]  [place] [id number]    Patient: Jessi Vann   YOB: 1960   Date of Visit: 2024       Dear Dr. Dietz:    Thank you for referring Jessi Vann to me for evaluation. Below are my notes for this consultation.    If you have questions, please do not hesitate to call me. I look forward to following your patient along with you.         Sincerely,        RAISSA Waldrop        CC: No Recipients    RAISSA Waldrop  2024 11:50 AM  Sign when Signing Visit    Ambulatory Visit  Name: Jessi Vann      : 1960      MRN: [id number]  Encounter Provider: RAISSA Waldrop  Encounter Date: 2024   Encounter department: [facility] [place]    Assessment & Plan  Chronic venous insufficiency of lower extremity  63-year-old with HTN, chronic venous insufficiency s/p R GSV EVLT, recurrent varicosities of the right lower extremity with venous ulcerations s/p AASV ablation and stab phlebectomies 2022 2/2 chronic venous wound, most recently with left distal anterior lower leg venous stasis ulceration s/p left GSV VenaSeal by Dr. Celaya 2024.    Patient returns to the office to recheck lower extremities     -LLE venous ulceration healed after venous intervention   -Since last visit with me she started to use compression pumps daily for an hour in the evening. Significant improvement in lower extremities with use of compression pumps. Knees uncomfortable after 45 minutes of using pump. We discussed trial two options and see what works 1 - using pumps 30 minutes in AM and 1 hour in evening and 2 - Try to dial down compression pump to 35 or 40mmHg to see if this is less bothersome on the knees   -Continue compression   -Follow up in 2-3 months to recheck legs with consistent use of compression pumps          Collax   Bernard Arvizu   Phone: [phone]  Fax:  [phone]   E-mail: [email or web address]    Ordering Provider:  Roxy Barcenas (NPI: [id number])  [facility]  [address] [place] [address]   [address]  [place] [address]  Phone: [phone]  Fax: [phone]      Patient Information  MRN: [id number]   Jessi Vann  1960  [address] [facility] [address] [phone]     Insurance Information  Payor: [employer] / Plan: [employer] PPO / Product Type: PPO /      [id number] - (Commercial / Managed Care)     Patient Height and Weight 5' 2\" (1.575 m)    Wt Readings from Last 1 Encounters:   09/24/24 91.1 kg (200 lb 12.8 oz)         Post 4-week Measurements      Body Part Right Left   Ankle / Forearm 23 cm 25 cm   Calf / Elbow  38 cm 41 cm   Knee / Bicep  45 cm 45 cm   Mid-Thigh / Axilla  55 cm 58 cm        History of Present Illness    Jessi Vann is a 64 y.o. female who returns to the office to recheck lower extremities.  She has chronic venous insufficiency.  She underwent left GSV VenaSeal in June for left venous stasis ulceration.  Left lower extremity ulceration has since healed.  She recently started pneumatic compression pumps ordered at last visit with me.  She is using them at the end of the day for an hour.  At the last 45 minutes becomes uncomfortable and bothersome to the knee.  She overall feels her legs are much improved and more stamina in the legs since starting compression pumps.  She continues compression stockings daily.     History obtained from : patient  Review of Systems   All other systems reviewed and are negative.    Medical History Reviewed by provider this encounter:  Tobacco  Allergies  Meds  Problems  Med Hx  Surg Hx  Fam Hx         Objective  I have reviewed and made appropriate changes to the review of systems input by the medical assistant.    Vitals:    09/24/24 1041   BP: 132/82   BP Location: Right arm   Patient Position: Sitting   Cuff Size: Large   Pulse: 62   Resp: 18   SpO2: 97% " "  Weight: 91.1 kg (200 lb 12.8 oz)   Height: 5' 2\" (1.575 m)       Patient Active Problem List   Diagnosis   • Cellulitis of right anterior lower leg   • Venous ulcer of left leg (HCC)   • COVID-19   • Elevated liver enzymes   • Thrombophlebitis   • Chronic venous insufficiency of lower extremity   • Uterine procidentia   • Preop examination   • History of DVT of lower extremity   • Vitamin D deficiency   • Prediabetes   • Hypercholesterolemia   • Class 2 obesity due to excess calories without serious comorbidity with body mass index (BMI) of 39.0 to 39.9 in adult   • Secondary lymphedema   • Encounter for pessary maintenance   • Dysuria       Past Surgical History:   Procedure Laterality Date   • FRACTURE SURGERY Right 1984    with hardware; right leg   • [place] ENDOVEN ABLTJ INCMPTNT VEIN XTR LASER 1ST VEIN Right 06/24/2022    Procedure: ENDOVASCULAR LASER THERAPY (EVLT) right leg with multiple stab phlebectomies;  Surgeon: Sohail Baer MD;  Location:  MAIN OR;  Service: Vascular   • [place] ENDOVEN ABLTJ INCMPTNT VEIN XTR LASER 1ST VEIN Left 6/5/2024    Procedure: left greater saphenous vein closure with VenaSeal;  Surgeon: David Celaya DO;  Location: [facility];  Service: Vascular   • OK STAB PHLEBT VARICOSE VEINS 1 XTR > 20 INCS Right 06/24/2022    Procedure: MULTIPLE STAB PHLEBECTOMIES, 10 stabs;  Surgeon: Sohail Baer MD;  Location:  MAIN OR;  Service: Vascular       Family History   Problem Relation Age of Onset   • No Known Problems Mother    • No Known Problems Father    • No Known Problems Sister    • No Known Problems Brother    • No Known Problems Daughter    • No Known Problems Son    • No Known Problems Maternal Aunt    • No Known Problems Maternal Uncle    • No Known Problems Paternal Aunt    • No Known Problems Paternal Uncle    • No Known Problems Maternal Grandmother    • No Known Problems Maternal Grandfather    • No Known Problems Paternal Grandmother    • No Known Problems Paternal " Grandfather    • No Known Problems Cousin        Social History     Socioeconomic History   • Marital status: /Civil Union     Spouse name: Not on file   • Number of children: Not on file   • Years of education: Not on file   • Highest education level: Not on file   Occupational History   • Not on file   Tobacco Use   • Smoking status: Never   • Smokeless tobacco: Never   Vaping Use   • Vaping status: Never Used   Substance and Sexual Activity   • Alcohol use: Not Currently   • Drug use: Never   • Sexual activity: Not Currently   Other Topics Concern   • Not on file   Social History Narrative   • Not on file     Social Determinants of Health     Financial Resource Strain: Not on file   Food Insecurity: No Food Insecurity (1/12/2022)    Hunger Vital Sign    • Worried About Running Out of Food in the Last Year: Never true    • Ran Out of Food in the Last Year: Never true   Transportation Needs: Unknown (1/12/2022)    PRAPARE - Transportation    • Lack of Transportation (Medical): No    • Lack of Transportation (Non-Medical): Not on file   Physical Activity: Not on file   Stress: Not on file   Social Connections: Not on file   Intimate Partner Violence: Not on file   Housing Stability: Unknown (1/12/2022)    Housing Stability Vital Sign    • Unable to Pay for Housing in the Last Year: No    • Number of Places Lived in the Last Year: Not on file    • Unstable Housing in the Last Year: No       Allergies   Allergen Reactions   • Wound Dressing Adhesive Rash     bandaid adhesives         Current Outpatient Medications:   •  Acetaminophen 325 MG CAPS, Take 3 capsules (975 mg total) by mouth every 8 (eight) hours as needed (mild pain), Disp: 30 capsule, Rfl: 0  •  betamethasone dipropionate (DIPROSONE) 0.05 % cream, Apply topically as needed (for redness), Disp: 30 g, Rfl: 0  •  ergocalciferol (VITAMIN D2) 50,000 units, Take 1 capsule (50,000 Units total) by mouth once a week, Disp: 12 capsule, Rfl: 3  •   "fluocinolone (SYNALAR) 0.01 % external solution, Apply topically 2 (two) times a day Apply to area of inflammation twice daily as needed for flareups/burning.  Do not use for more than 7 days at a time.  Not for daily use (Patient not taking: Reported on 8/5/2024), Disp: 60 mL, Rfl: 1    /82 (BP Location: Right arm, Patient Position: Sitting, Cuff Size: Large)   Pulse 62   Resp 18   Ht 5' 2\" (1.575 m)   Wt 91.1 kg (200 lb 12.8 oz)   SpO2 97%   BMI 36.73 kg/m²     Physical Exam  Vitals and nursing note reviewed.   Constitutional:       Appearance: Normal appearance.   HENT:      Head: Normocephalic and atraumatic.   Eyes:      Extraocular Movements: Extraocular movements intact.   Cardiovascular:      Pulses:           Dorsalis pedis pulses are 2+ on the right side and 2+ on the left side.      Heart sounds: Normal heart sounds.   Pulmonary:      Effort: Pulmonary effort is normal.      Breath sounds: Normal breath sounds.   Musculoskeletal:      Comments: BLE chronic venous stasis changes, brawny edema, hyperpigmentation's   Neurological:      General: No focal deficit present.      Mental Status: She is alert and oriented to person, place, and time.   Psychiatric:         Behavior: Behavior normal.       Administrative Statements  I have spent a total time of 30 minutes in caring for this patient on the day of the visit/encounter including Diagnostic results, Prognosis, Risks and benefits of tx options, Instructions for management, Patient and family education, Importance of tx compliance, Risk factor reductions, Impressions, Counseling / Coordination of care, Documenting in the medical record, Reviewing / ordering tests, medicine, procedures  , and Obtaining or reviewing history  .  "

## 2024-09-24 NOTE — PROGRESS NOTES
"  Ambulatory Visit  Name: Jessi Vann      : 1960      MRN: [id number]  Encounter Provider: RAISSA Waldrop  Encounter Date: 2024   Encounter department: 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 Celaya 2024.    Patient returns to the office to recheck lower extremities     -LLE venous ulceration healed after venous intervention   -Since last visit with me she started to use compression pumps daily for an hour in the evening. Significant improvement in lower extremities with use of compression pumps. Knees uncomfortable after 45 minutes of using pump. We discussed trial two options and see what works 1 - using pumps 30 minutes in AM and 1 hour in evening and 2 - Try to dial down compression pump to 35 or 40mmHg to see if this is less bothersome on the knees   -Continue compression   -Follow up in 2-3 months to recheck legs with consistent use of compression pumps          [place]   Bernard Nolberto   Phone: [phone]  Fax: [phone]   E-mail: [email or web address]    Ordering Provider:  RAISSA - Roxy Price (NPI: [id number])  [facility]  [address] [place] [address]   [address] [place] [address]  Phone: [phone]  Fax: [phone]      Patient Information  MRN: [id number]   Jessi Vann  1960  [address] [facility] [address] [phone]     Insurance Information  Payor: AET[employer] / Plan: AET[employer] PPO / Product Type: PPO /      [id number] - (Commercial / Managed Care)     Patient Height and Weight 5' 2\" (1.575 m)    Wt Readings from Last 1 " "Encounters:   09/24/24 91.1 kg (200 lb 12.8 oz)         Post 4-week Measurements      Body Part Right Left   Ankle / Forearm 23 cm 25 cm   Calf / Elbow  38 cm 41 cm   Knee / Bicep  45 cm 45 cm   Mid-Thigh / Axilla  55 cm 58 cm        History of Present Illness     Jessi Vann is a 64 y.o. female who returns to the office to recheck lower extremities.  She has chronic venous insufficiency.  She underwent left GSV VenaSeal in June for left venous stasis ulceration.  Left lower extremity ulceration has since healed.  She recently started pneumatic compression pumps ordered at last visit with me.  She is using them at the end of the day for an hour.  At the last 45 minutes becomes uncomfortable and bothersome to the knee.  She overall feels her legs are much improved and more stamina in the legs since starting compression pumps.  She continues compression stockings daily.     History obtained from : patient  Review of Systems   All other systems reviewed and are negative.    Medical History Reviewed by provider this encounter:  Tobacco  Allergies  Meds  Problems  Med Hx  Surg Hx  Fam Hx         Objective   I have reviewed and made appropriate changes to the review of systems input by the medical assistant.    Vitals:    09/24/24 1041   BP: 132/82   BP Location: Right arm   Patient Position: Sitting   Cuff Size: Large   Pulse: 62   Resp: 18   SpO2: 97%   Weight: 91.1 kg (200 lb 12.8 oz)   Height: 5' 2\" (1.575 m)       Patient Active Problem List   Diagnosis    Cellulitis of right anterior lower leg    Venous ulcer of left leg (HCC)    COVID-19    Elevated liver enzymes    Thrombophlebitis    Chronic venous insufficiency of lower extremity    Uterine procidentia    Preop examination    History of DVT of lower extremity    Vitamin D deficiency    Prediabetes    Hypercholesterolemia    Class 2 obesity due to excess calories without serious comorbidity with body mass index (BMI) of 39.0 to 39.9 in adult    Secondary " lymphedema    Encounter for pessary maintenance    Dysuria       Past Surgical History:   Procedure Laterality Date    FRACTURE SURGERY Right 1984    with hardware; right leg    MA ENDOVEN ABLTJ INCMPTNT VEIN XTR LASER 1ST VEIN Right 06/24/2022    Procedure: ENDOVASCULAR LASER THERAPY (EVLT) right leg with multiple stab phlebectomies;  Surgeon: Sohail Baer MD;  Location:  MAIN OR;  Service: Vascular    MA ENDOVEN ABLTJ INCMPTNT VEIN XTR LASER 1ST VEIN Left 6/5/2024    Procedure: left greater saphenous vein closure with VenaSeal;  Surgeon: David Celaya DO;  Location: [facility];  Service: Vascular    MA STAB PHLEBT VARICOSE VEINS 1 XTR > 20 INCS Right 06/24/2022    Procedure: MULTIPLE STAB PHLEBECTOMIES, 10 stabs;  Surgeon: Sohail Baer MD;  Location:  MAIN OR;  Service: Vascular       Family History   Problem Relation Age of Onset    No Known Problems Mother     No Known Problems Father     No Known Problems Sister     No Known Problems Brother     No Known Problems Daughter     No Known Problems Son     No Known Problems Maternal Aunt     No Known Problems Maternal Uncle     No Known Problems Paternal Aunt     No Known Problems Paternal Uncle     No Known Problems Maternal Grandmother     No Known Problems Maternal Grandfather     No Known Problems Paternal Grandmother     No Known Problems Paternal Grandfather     No Known Problems Cousin        Social History     Socioeconomic History    Marital status: /Civil Union     Spouse name: Not on file    Number of children: Not on file    Years of education: Not on file    Highest education level: Not on file   Occupational History    Not on file   Tobacco Use    Smoking status: Never    Smokeless tobacco: Never   Vaping Use    Vaping status: Never Used   Substance and Sexual Activity    Alcohol use: Not Currently    Drug use: Never    Sexual activity: Not Currently   Other Topics Concern    Not on file   Social History Narrative    Not on  "file     Social Determinants of Health     Financial Resource Strain: Not on file   Food Insecurity: No Food Insecurity (1/12/2022)    Hunger Vital Sign     Worried About Running Out of Food in the Last Year: Never true     Ran Out of Food in the Last Year: Never true   Transportation Needs: Unknown (1/12/2022)    PRAPARE - Transportation     Lack of Transportation (Medical): No     Lack of Transportation (Non-Medical): Not on file   Physical Activity: Not on file   Stress: Not on file   Social Connections: Not on file   Intimate Partner Violence: Not on file   Housing Stability: Unknown (1/12/2022)    Housing Stability Vital Sign     Unable to Pay for Housing in the Last Year: No     Number of Places Lived in the Last Year: Not on file     Unstable Housing in the Last Year: No       Allergies   Allergen Reactions    Wound Dressing Adhesive Rash     bandaid adhesives         Current Outpatient Medications:     Acetaminophen 325 MG CAPS, Take 3 capsules (975 mg total) by mouth every 8 (eight) hours as needed (mild pain), Disp: 30 capsule, Rfl: 0    betamethasone dipropionate (DIPROSONE) 0.05 % cream, Apply topically as needed (for redness), Disp: 30 g, Rfl: 0    ergocalciferol (VITAMIN D2) 50,000 units, Take 1 capsule (50,000 Units total) by mouth once a week, Disp: 12 capsule, Rfl: 3    fluocinolone (SYNALAR) 0.01 % external solution, Apply topically 2 (two) times a day Apply to area of inflammation twice daily as needed for flareups/burning.  Do not use for more than 7 days at a time.  Not for daily use (Patient not taking: Reported on 8/5/2024), Disp: 60 mL, Rfl: 1    /82 (BP Location: Right arm, Patient Position: Sitting, Cuff Size: Large)   Pulse 62   Resp 18   Ht 5' 2\" (1.575 m)   Wt 91.1 kg (200 lb 12.8 oz)   SpO2 97%   BMI 36.73 kg/m²     Physical Exam  Vitals and nursing note reviewed.   Constitutional:       Appearance: Normal appearance.   HENT:      Head: Normocephalic and atraumatic.   Eyes: "      Extraocular Movements: Extraocular movements intact.   Cardiovascular:      Pulses:           Dorsalis pedis pulses are 2+ on the right side and 2+ on the left side.      Heart sounds: Normal heart sounds.   Pulmonary:      Effort: Pulmonary effort is normal.      Breath sounds: Normal breath sounds.   Musculoskeletal:      Comments: BLE chronic venous stasis changes, brawny edema, hyperpigmentation's   Neurological:      General: No focal deficit present.      Mental Status: She is alert and oriented to person, place, and time.   Psychiatric:         Behavior: Behavior normal.       Administrative Statements   I have spent a total time of 30 minutes in caring for this patient on the day of the visit/encounter including Diagnostic results, Prognosis, Risks and benefits of tx options, Instructions for management, Patient and family education, Importance of tx compliance, Risk factor reductions, Impressions, Counseling / Coordination of care, Documenting in the medical record, Reviewing / ordering tests, medicine, procedures  , and Obtaining or reviewing history  .

## 2024-09-25 ENCOUNTER — OFFICE VISIT (OUTPATIENT)
Dept: FAMILY MEDICINE CLINIC | Facility: HOSPITAL | Age: 64
End: 2024-09-25
Payer: COMMERCIAL

## 2024-09-25 VITALS
DIASTOLIC BLOOD PRESSURE: 80 MMHG | HEART RATE: 64 BPM | SYSTOLIC BLOOD PRESSURE: 124 MMHG | OXYGEN SATURATION: 97 % | BODY MASS INDEX: 36.21 KG/M2 | WEIGHT: 198 LBS

## 2024-09-25 DIAGNOSIS — N81.3 UTERINE PROCIDENTIA: ICD-10-CM

## 2024-09-25 DIAGNOSIS — E66.812 CLASS 2 OBESITY DUE TO EXCESS CALORIES WITHOUT SERIOUS COMORBIDITY WITH BODY MASS INDEX (BMI) OF 36.0 TO 36.9 IN ADULT: ICD-10-CM

## 2024-09-25 DIAGNOSIS — E78.00 HYPERCHOLESTEROLEMIA: ICD-10-CM

## 2024-09-25 DIAGNOSIS — E66.09 CLASS 2 OBESITY DUE TO EXCESS CALORIES WITHOUT SERIOUS COMORBIDITY WITH BODY MASS INDEX (BMI) OF 36.0 TO 36.9 IN ADULT: ICD-10-CM

## 2024-09-25 DIAGNOSIS — Z01.818 PRE-OP EXAMINATION: Primary | ICD-10-CM

## 2024-09-25 PROBLEM — R30.0 DYSURIA: Status: RESOLVED | Noted: 2024-08-05 | Resolved: 2024-09-25

## 2024-09-25 LAB — VENTRICULAR RATE: 64 DEGREES

## 2024-09-25 PROCEDURE — 99214 OFFICE O/P EST MOD 30 MIN: CPT | Performed by: NURSE PRACTITIONER

## 2024-09-25 PROCEDURE — 93000 ELECTROCARDIOGRAM COMPLETE: CPT | Performed by: NURSE PRACTITIONER

## 2024-09-25 NOTE — ASSESSMENT & PLAN NOTE
Hx hypercholesterolemia, working on optimizing diet and physical activity.  Not currently on any statin or lipid-lowering medication.  Will be rechecking lipid panel in the winter.

## 2024-09-25 NOTE — PROGRESS NOTES
[facility] PRE[facility]IVE [facility]ALU[facility]  [facility] PHYSICIAN [facility] PRIMARY CARE [address]    NAME: Jessi Vann  AGE: 64 y.o. SEX: female  : 1960   DATE: 2024    History of Present Illness:     Jessi Vann is a 64 y.o. female who presents to the office today for a preoperative consultation at the request of surgeon, Dr. Owens, who plans on performing uterine prolapse repair on 10/7/24. Planned anesthesia is  TBD . Patient has a bleeding risk of: no recent abnormal bleeding, no remote history of abnormal bleeding, and no use of Ca-channel blockers. Patient does not have objections to receiving blood products if needed. Current anti-platelet/anti-coagulation medications that the patient is prescribed includes:  N/A .         Assessment of Cardiac Risk:  Denies unstable or severe angina or MI in the last 6 weeks or history of stent placement in the last year   Denies decompensated heart failure (e.g. New onset heart failure, NYHA functional class IV heart failure, or worsening existing heart failure)  Denies significant arrhythmias such as high grade AV block, symptomatic ventricular arrhythmia, newly recognized ventricular tachycardia, supraventricular tachycardia with resting heart rate >100, or symptomatic bradycardia  Denies severe heart valve disease including aortic stenosis or symptomatic mitral stenosis     Exercise Capacity:  Able to walk 4 blocks without symptoms?: Yes  Able to walk 2 flights without symptoms?: Yes    Prior Anesthesia Reactions: No  Personal history of venous thromboembolic disease? Yes,  postpartum  History of steroid use for >2 weeks within last year? No          Review of Systems:     Review of Systems   Constitutional: Negative.  Negative for activity change, appetite change, chills, fatigue and fever.   HENT: Negative.  Negative for congestion, ear pain, postnasal drip and sinus pain.    Eyes: Negative.    Respiratory: Negative.  Negative for  cough, chest tightness and shortness of breath.    Cardiovascular: Negative.  Negative for chest pain, palpitations and leg swelling.   Gastrointestinal: Negative.  Negative for constipation and diarrhea.   Endocrine: Negative.    Genitourinary: Negative.  Negative for dysuria.   Musculoskeletal:  Positive for arthralgias.   Skin: Negative.    Allergic/Immunologic: Negative.  Negative for immunocompromised state.   Neurological: Negative.  Negative for dizziness and light-headedness.   Hematological: Negative.    Psychiatric/Behavioral: Negative.  Negative for sleep disturbance.        Current Problem List:     Patient Active Problem List   Diagnosis    Cellulitis of right anterior lower leg    Venous ulcer of left leg (HCC)    COVID-19    Elevated liver enzymes    Thrombophlebitis    Chronic venous insufficiency of lower extremity    Uterine procidentia    Preop examination    History of DVT of lower extremity    Vitamin D deficiency    Prediabetes    Hypercholesterolemia    Class 2 obesity due to excess calories without serious comorbidity with body mass index (BMI) of 36.0 to 36.9 in adult    Secondary lymphedema    Encounter for pessary maintenance       Allergies:     Allergies   Allergen Reactions    Wound Dressing Adhesive Rash     bandaid adhesives       Current Medications:       Current Outpatient Medications:     Acetaminophen 325 MG CAPS, Take 3 capsules (975 mg total) by mouth every 8 (eight) hours as needed (mild pain), Disp: 30 capsule, Rfl: 0    betamethasone dipropionate (DIPROSONE) 0.05 % cream, Apply topically as needed (for redness), Disp: 30 g, Rfl: 0    ergocalciferol (VITAMIN D2) 50,000 units, Take 1 capsule (50,000 Units total) by mouth once a week, Disp: 12 capsule, Rfl: 3    fluocinolone (SYNALAR) 0.01 % external solution, Apply topically 2 (two) times a day Apply to area of inflammation twice daily as needed for flareups/burning.  Do not use for more than 7 days at a time.  Not for daily  use, Disp: 60 mL, Rfl: 1    Past Medical History:       Past Medical History:   Diagnosis Date    Blood clot in vein 1999    RLE, post partum    Chronic venous hypertension (idiopathic) with ulcer of right lower extremity (HCC) 01/03/2022    COVID-19 12/2021    Hypertension     Ulcer of right shin (HCC)         Past Surgical History:   Procedure Laterality Date    FRACTURE SURGERY Right 1984    with hardware; right leg    KS ENDOVEN ABLTJ INCMPTNT VEIN XTR LASER 1ST VEIN Right 06/24/2022    Procedure: ENDOVASCULAR LASER THERAPY (EVLT) right leg with multiple stab phlebectomies;  Surgeon: Sohail Baer MD;  Location:  MAIN OR;  Service: Vascular    KS ENDOVEN ABLTJ INCMPTNT VEIN XTR LASER 1ST VEIN Left 6/5/2024    Procedure: left greater saphenous vein closure with VenaSeal;  Surgeon: David Celaya DO;  [facility]: [facility];  Service: Vascular    KS STAB PHLEBT VARICOSE VEINS 1 XTR > 20 INCS Right 06/24/2022    Procedure: MULTIPLE STAB PHLEBECTOMIES, 10 stabs;  Surgeon: Sohail Baer MD;  Location:  MAIN OR;  Service: Vascular        Family History   Problem Relation Age of Onset    No Known Problems Mother     No Known Problems Father     No Known Problems Sister     No Known Problems Brother     No Known Problems Daughter     No Known Problems Son     No Known Problems Maternal Aunt     No Known Problems Maternal Uncle     No Known Problems Paternal Aunt     No Known Problems Paternal Uncle     No Known Problems Maternal Grandmother     No Known Problems Maternal Grandfather     No Known Problems Paternal Grandmother     No Known Problems Paternal Grandfather     No Known Problems Cousin         Social History     Socioeconomic History    Marital status: /Civil Union     Spouse name: Not on file    Number of children: Not on file    Years of education: Not on file    Highest education level: Not on file   Occupational History    Not on file   Tobacco Use    Smoking status: Never    Smokeless  tobacco: Never   Vaping Use    Vaping status: Never Used   Substance and Sexual Activity    Alcohol use: Not Currently    Drug use: Never    Sexual activity: Not Currently   Other Topics Concern    Not on file   Social History Narrative    Not on file     Social Determinants of Health     Financial Resource Strain: Not on file   Food Insecurity: No Food Insecurity (1/12/2022)    Hunger Vital Sign     Worried About Running Out of Food in the Last Year: Never true     Ran Out of Food in the Last Year: Never true   Transportation Needs: Unknown (1/12/2022)    PRAPARE - Transportation     Lack of Transportation (Medical): No     Lack of Transportation (Non-Medical): Not on file   Physical Activity: Not on file   Stress: Not on file   Social Connections: Not on file   Intimate Partner Violence: Not on file   Housing Stability: Unknown (1/12/2022)    Housing Stability Vital Sign     Unable to Pay for Housing in the Last Year: No     Number of Places Lived in the Last Year: Not on file     Unstable Housing in the Last Year: No        Physical Exam:     /80 (BP Location: Left arm, Patient Position: Sitting, Cuff Size: Standard)   Pulse 64   Wt 89.8 kg (198 lb)   SpO2 97%   BMI 36.21 kg/m²     Physical Exam  Vitals and nursing note reviewed.   Constitutional:       Appearance: Normal appearance. She is obese.   HENT:      Head: Normocephalic and atraumatic.      Right Ear: Tympanic membrane, ear canal and external ear normal.      Left Ear: Tympanic membrane, ear canal and external ear normal.      Nose: Nose normal.      Mouth/Throat:      Mouth: Mucous membranes are moist.      Pharynx: Oropharynx is clear.   Eyes:      Extraocular Movements: Extraocular movements intact.      Conjunctiva/sclera: Conjunctivae normal.      Pupils: Pupils are equal, round, and reactive to light.   Cardiovascular:      Rate and Rhythm: Normal rate and regular rhythm.      Pulses: Normal pulses.      Heart sounds: Normal heart  sounds.   Pulmonary:      Effort: Pulmonary effort is normal.      Breath sounds: Normal breath sounds.   Abdominal:      General: Bowel sounds are normal.      Palpations: Abdomen is soft.   Musculoskeletal:         General: Normal range of motion.      Cervical back: Normal range of motion and neck supple.      Right lower leg: No edema.      Left lower leg: No edema.      Comments: Compression stockings in place   Skin:     General: Skin is warm and dry.   Neurological:      General: No focal deficit present.      Mental Status: She is alert and oriented to person, place, and time.      Sensory: Sensory deficit present.      Gait: Gait abnormal.   Psychiatric:         Mood and Affect: Mood normal.         Behavior: Behavior normal.         Thought Content: Thought content normal.         Judgment: Judgment normal.        Data:     Pre-operative work-up    Laboratory Results:  CBCD on 24:  WBC 9.1, H/H 13.1/40, . Diff WNL.  BMP 24 stable NaCl 139/103, BUN 21, Cr 0.86 with GFR 75     EK2024 - NSR with known age undetermined anteroseptal infarct, Twave inversions I and aVL; no change from prior EKG 22    Chest x-ray: No pertinent imaging studies reviewed. N/A    Previous cardiopulmonary studies within the past year:  Echocardiogram: N/A  Cardiac Catheterization: N/A  Stress Test: N/A  Pulmonary Function Testing: N/A      Assessment & Recommendations:     1. Pre-op examination  -     POCT ECG  2. Uterine procidentia  3. Hypercholesterolemia  Assessment & Plan:   hypercholesterolemia, working on optimizing diet and physical activity.  Not currently on any statin or lipid-lowering medication.  Will be rechecking lipid panel in the winter.  4. Class 2 obesity due to excess calories without serious comorbidity with body mass index (BMI) of 36.0 to 36.9 in adult  Assessment & Plan:  Body mass index is 36.21 kg/m².   has lost 13 pounds intentionally over the past 4 months through  dietary and physical activity modifications.      Pre-Op Evaluation Assessment  64 y.o. female with planned surgery: prolapsed uterus repair.    Known risk factors for perioperative complications: None.    Current medications which may produce withdrawal symptoms if withheld perioperatively: N/A.    Pre-Op Evaluation Plan  1. Further preoperative workup as follows:   - None; no further preoperative work-up is required    2. Medication Management/Recommendations:   - Patient has been instructed to avoid herbs or non-directed vitamins the week prior to surgery to ensure no drug interactions with perioperative surgical and anesthetic medications.  - Patient has been instructed to avoid aspirin containing medications or non-steroidal anti-inflammatory drugs for the week preceding surgery.    3. Prophylaxis for cardiac events with perioperative beta-blockers: not indicated.    4. Patient requires further consultation with: None    Clearance  Patient is CLEARED for surgery without any additional cardiac testing.     RAISSA Ignacio  [facility] CARE [address]  [facility] [address] [address]  Phone#  [phone]  Fax#  [phone]

## 2024-09-25 NOTE — ASSESSMENT & PLAN NOTE
Body mass index is 36.21 kg/m².   has lost 13 pounds intentionally over the past 4 months through dietary and physical activity modifications.

## 2024-10-03 ENCOUNTER — TELEPHONE (OUTPATIENT)
Age: 64
End: 2024-10-03

## 2024-10-03 NOTE — TELEPHONE ENCOUNTER
Pt was seen in office on 9/25 where she briefly discussed issues with both knees. Pt stated her  R is worse than L. Pt would appreicate orders for xrays for bi lateral knees to be placed. Please call to advise, phone# [phone], thank you.

## 2024-10-04 DIAGNOSIS — M17.0 PRIMARY OSTEOARTHRITIS OF BOTH KNEES: Primary | ICD-10-CM

## 2024-10-24 ENCOUNTER — TELEPHONE (OUTPATIENT)
Age: 64
End: 2024-10-24

## 2024-10-28 ENCOUNTER — APPOINTMENT (OUTPATIENT)
Dept: RADIOLOGY | Facility: CLINIC | Age: 64
End: 2024-10-28
Payer: COMMERCIAL

## 2024-10-28 DIAGNOSIS — M17.0 PRIMARY OSTEOARTHRITIS OF BOTH KNEES: ICD-10-CM

## 2024-10-28 PROCEDURE — 73562 X-RAY EXAM OF KNEE 3: CPT

## 2024-10-31 DIAGNOSIS — M17.0 OSTEOARTHRITIS OF BOTH KNEES, UNSPECIFIED OSTEOARTHRITIS TYPE: Primary | ICD-10-CM

## 2024-12-10 ENCOUNTER — OFFICE VISIT (OUTPATIENT)
Dept: VASCULAR SURGERY | Facility: CLINIC | Age: 64
End: 2024-12-10
Payer: COMMERCIAL

## 2024-12-10 VITALS
BODY MASS INDEX: 35.51 KG/M2 | OXYGEN SATURATION: 99 % | WEIGHT: 193 LBS | DIASTOLIC BLOOD PRESSURE: 86 MMHG | HEIGHT: 62 IN | SYSTOLIC BLOOD PRESSURE: 136 MMHG | HEART RATE: 66 BPM

## 2024-12-10 DIAGNOSIS — I87.2 CHRONIC VENOUS INSUFFICIENCY OF LOWER EXTREMITY: Primary | ICD-10-CM

## 2024-12-10 PROCEDURE — 99213 OFFICE O/P EST LOW 20 MIN: CPT | Performed by: NURSE PRACTITIONER

## 2024-12-10 NOTE — PROGRESS NOTES
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 Celaya 2024.     Patient returns to the office to recheck lower extremities with consistent use of compression pump     -She is using compression pumps twice daily for an hour each  -Intermittent discomfort in the legs but overall doing well  -Right medial proximal calf varicosity has reoccurred.  Would continue with conservative measures including pumps, compression, periodic leg elevation and only consider intervention if recurrent wound  -Replace compression stockings periodically to maintain elasticity  -Recommendations given for Jobst or Mitchell's 20 to 30 mmHg compression stockings  -Follow-up in the office on an as-needed basis.  Any concerns she will notify the office         Chief Complaint   Patient presents with    Venous Disease     Pt is here for 3 mo fu for compression pumps. Pt is wearing compression pumps everyday 2x per day. Pt states bulging veins has worsened in Rt legs. Pt states that swelling in legs has improved. Pt has no other complaints.            History of Present Illness   Jessi Vann is a 64 y.o. female who presents to the office to reevaluate her legs and consistent use of compression pumps.  Since last visit she is using compression pumps twice daily for an hour each.  Occasionally her legs are uncomfortable.  She is wearing compression stockings daily.  She has intermittent discomfort at the right medial  "proximal calf vein.  Tender to touch today, soft, not phlebitic in nature.  She is compliant with compression stockings.  No recurrent ulcerations.  History obtained from: patient    Review of Systems   Cardiovascular:  Positive for leg swelling.   Skin:  Positive for color change. Negative for rash and wound.     Medical History Reviewed by provider this encounter:  Tobacco  Allergies  Meds  Problems  Med Hx  Surg Hx  Fam Hx     .     Objective   I have reviewed and made appropriate changes to the review of systems input by the medical assistant.    Vitals:    12/10/24 1108   BP: 136/86   BP Location: Left arm   Patient Position: Sitting   Cuff Size: Standard   Pulse: 66   SpO2: 99%   Weight: 87.5 kg (193 lb)   Height: 5' 2\" (1.575 m)       Patient Active Problem List   Diagnosis    Cellulitis of right anterior lower leg    Venous ulcer of left leg (HCC)    COVID-19    Elevated liver enzymes    Thrombophlebitis    Chronic venous insufficiency of lower extremity    Uterine procidentia    Preop examination    History of DVT of lower extremity    Vitamin D deficiency    Prediabetes    Hypercholesterolemia    Class 2 obesity due to excess calories without serious comorbidity with body mass index (BMI) of 36.0 to 36.9 in adult    Secondary lymphedema    Encounter for pessary maintenance       Past Surgical History:   Procedure Laterality Date    FRACTURE SURGERY Right 1984    with hardware; right leg    VA ENDOVEN ABLTJ INCMPTNT VEIN XTR LASER 1ST VEIN Right 06/24/2022    Procedure: ENDOVASCULAR LASER THERAPY (EVLT) right leg with multiple stab phlebectomies;  Surgeon: Sohail Baer MD;  Location:  MAIN OR;  Service: Vascular    VA ENDOVEN ABLTJ INCMPTNT VEIN XTR LASER 1ST VEIN Left 6/5/2024    Procedure: left greater saphenous vein closure with VenaSeal;  Surgeon: David Celaya DO;  Location: [facility];  Service: Vascular    VA STAB PHLEBT VARICOSE VEINS 1 XTR > 20 INCS Right 06/24/2022    Procedure: " MULTIPLE STAB PHLEBECTOMIES, 10 stabs;  Surgeon: Sohail Baer MD;  Location:  MAIN OR;  Service: Vascular       Family History   Problem Relation Age of Onset    No Known Problems Mother     No Known Problems Father     No Known Problems Sister     No Known Problems Brother     No Known Problems Daughter     No Known Problems Son     No Known Problems Maternal Aunt     No Known Problems Maternal Uncle     No Known Problems Paternal Aunt     No Known Problems Paternal Uncle     No Known Problems Maternal Grandmother     No Known Problems Maternal Grandfather     No Known Problems Paternal Grandmother     No Known Problems Paternal Grandfather     No Known Problems Cousin        Social History     Socioeconomic History    Marital status: /Civil Union     Spouse name: Not on file    Number of children: Not on file    Years of education: Not on file    Highest education level: Not on file   Occupational History    Not on file   Tobacco Use    Smoking status: Never    Smokeless tobacco: Never   Vaping Use    Vaping status: Never Used   Substance and Sexual Activity    Alcohol use: Not Currently    Drug use: Never    Sexual activity: Not Currently   Other Topics Concern    Not on file   Social History Narrative    Not on file     Social Drivers of Health     Financial Resource Strain: Not on file   Food Insecurity: No Food Insecurity (1/12/2022)    Hunger Vital Sign     Worried About Running Out of Food in the Last Year: Never true     Ran Out of Food in the Last Year: Never true   Transportation Needs: Unknown (1/12/2022)    PRAPARE - Transportation     Lack of Transportation (Medical): No     Lack of Transportation (Non-Medical): Not on file   Physical Activity: Not on file   Stress: Not on file   Social Connections: Not on file   Intimate Partner Violence: Not on file   Housing Stability: Unknown (1/12/2022)    Housing Stability Vital Sign     Unable to Pay for Housing in the Last Year: No     Number of  "Places Lived in the Last Year: Not on file     Unstable Housing in the Last Year: No       Allergies   Allergen Reactions    Wound Dressing Adhesive Rash     bandaid adhesives         Current Outpatient Medications:     Acetaminophen 325 MG CAPS, Take 3 capsules (975 mg total) by mouth every 8 (eight) hours as needed (mild pain), Disp: 30 capsule, Rfl: 0    betamethasone dipropionate (DIPROSONE) 0.05 % cream, Apply topically as needed (for redness), Disp: 30 g, Rfl: 0    ergocalciferol (VITAMIN D2) 50,000 units, Take 1 capsule (50,000 Units total) by mouth once a week, Disp: 12 capsule, Rfl: 3    fluocinolone (SYNALAR) 0.01 % external solution, Apply topically 2 (two) times a day Apply to area of inflammation twice daily as needed for flareups/burning.  Do not use for more than 7 days at a time.  Not for daily use, Disp: 60 mL, Rfl: 1    /86 (BP Location: Left arm, Patient Position: Sitting, Cuff Size: Standard)   Pulse 66   Ht 5' 2\" (1.575 m)   Wt 87.5 kg (193 lb)   SpO2 99%   BMI 35.30 kg/m²      Physical Exam  Vitals and nursing note reviewed. Exam conducted with a chaperone present.   Constitutional:       Appearance: Normal appearance.   HENT:      Head: Normocephalic and atraumatic.   Eyes:      Extraocular Movements: Extraocular movements intact.   Cardiovascular:      Pulses:           Dorsalis pedis pulses are 2+ on the right side and 2+ on the left side.      Heart sounds: Normal heart sounds.   Pulmonary:      Effort: Pulmonary effort is normal.      Breath sounds: Normal breath sounds.   Musculoskeletal:      Right lower leg: No edema.      Left lower leg: No edema.      Comments: Truncal varicosity right medial proximal calf, mild hyperpigmentation   Skin:     General: Skin is warm.   Neurological:      General: No focal deficit present.      Mental Status: She is alert and oriented to person, place, and time.   Psychiatric:         Mood and Affect: Mood normal.         Behavior: Behavior " normal.         Administrative Statements   I have spent a total time of 25 minutes in caring for this patient on the day of the visit/encounter including Prognosis, Risks and benefits of tx options, Instructions for management, Patient and family education, Importance of tx compliance, Risk factor reductions, Impressions, Counseling / Coordination of care, and Documenting in the medical record.

## 2024-12-10 NOTE — ASSESSMENT & PLAN NOTE
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 Celaya 6/5/2024.     Patient returns to the office to recheck lower extremities with consistent use of compression pump     -She is using compression pumps twice daily for an hour each  -Intermittent discomfort in the legs but overall doing well  -Right medial proximal calf varicosity has reoccurred.  Would continue with conservative measures including pumps, compression, periodic leg elevation and only consider intervention if recurrent wound  -Replace compression stockings periodically to maintain elasticity  -Recommendations given for Jobst or Mitchell's 20 to 30 mmHg compression stockings  -Follow-up in the office on an as-needed basis.  Any concerns she will notify the office         Chief Complaint   Patient presents with    Venous Disease     Pt is here for 3 mo fu for compression pumps. Pt is wearing compression pumps everyday 2x per day. Pt states bulging veins has worsened in Rt legs. Pt states that swelling in legs has improved. Pt has no other complaints.

## (undated) DEVICE — BETHLEHEM UNIVERSAL MINOR GEN: Brand: CARDINAL HEALTH

## (undated) DEVICE — SHEATH INTRO MICRO SET 7FR 7CM

## (undated) DEVICE — PREP PAD BNS: Brand: CONVERTORS

## (undated) DEVICE — SPONGE STICK WITH PVP-I: Brand: KENDALL

## (undated) DEVICE — GEL ULTRASOUND 8OZ HIGH VISCOUS

## (undated) DEVICE — GLOVE SRG BIOGEL 8.5

## (undated) DEVICE — SUT SILK 4-0 30 IN A303H

## (undated) DEVICE — TONGUE DEPRESSOR STERILE

## (undated) DEVICE — COVER PROBE INTRAOPERATIVE 6 X 96 IN

## (undated) DEVICE — DRAPE SHEET X-LG

## (undated) DEVICE — SCD SEQUENTIAL COMPRESSION COMFORT SLEEVE MEDIUM KNEE LENGTH: Brand: KENDALL SCD

## (undated) DEVICE — TIBURON SPLIT SHEET: Brand: CONVERTORS

## (undated) DEVICE — CHLORAPREP HI-LITE 26ML ORANGE

## (undated) DEVICE — ACE WRAP 6 IN STERILE

## (undated) DEVICE — SYRINGE 20ML LL

## (undated) DEVICE — 2000CC GUARDIAN II: Brand: GUARDIAN

## (undated) DEVICE — 3M™ STERI-STRIP™ REINFORCED ADHESIVE SKIN CLOSURES, R1546, 1/4 IN X 4 IN (6 MM X 100 MM), 10 STRIPS/ENVELOPE: Brand: 3M™ STERI-STRIP™

## (undated) DEVICE — COBAN 4 IN STERILE

## (undated) DEVICE — PACK MAJOR ORTHO W/SPLITS PBDS

## (undated) DEVICE — ACE WRAP 4 IN STERILE

## (undated) DEVICE — GAUZE SPONGES,16 PLY: Brand: CURITY

## (undated) DEVICE — INTENDED FOR TISSUE SEPARATION, AND OTHER PROCEDURES THAT REQUIRE A SHARP SURGICAL BLADE TO PUNCTURE OR CUT.: Brand: BARD-PARKER SAFETY BLADES SIZE 11, STERILE

## (undated) DEVICE — GLOVE INDICATOR PI UNDERGLOVE SZ 8 BLUE

## (undated) DEVICE — KERLIX BANDAGE ROLL: Brand: KERLIX

## (undated) DEVICE — SUT SILK 3-0 30 IN A304H

## (undated) DEVICE — GLOVE SRG BIOGEL 7.5

## (undated) DEVICE — FIBER PROC KIT GOLD TIP 21G 45CM NEVERTOUCH

## (undated) DEVICE — PROVE COVER: Brand: UNBRANDED

## (undated) DEVICE — DRAPE SHEET THREE QUARTER

## (undated) DEVICE — ULTRASOUND GEL STERILE FOIL PK

## (undated) DEVICE — IRR SET 4M PG F/TUMESCENT